# Patient Record
Sex: FEMALE | Race: BLACK OR AFRICAN AMERICAN | NOT HISPANIC OR LATINO | Employment: OTHER | URBAN - METROPOLITAN AREA
[De-identification: names, ages, dates, MRNs, and addresses within clinical notes are randomized per-mention and may not be internally consistent; named-entity substitution may affect disease eponyms.]

---

## 2017-09-08 ENCOUNTER — OFFICE VISIT (OUTPATIENT)
Dept: URGENT CARE | Facility: CLINIC | Age: 77
End: 2017-09-08
Payer: MEDICARE

## 2017-09-08 VITALS
HEART RATE: 83 BPM | HEIGHT: 67 IN | BODY MASS INDEX: 21.97 KG/M2 | DIASTOLIC BLOOD PRESSURE: 82 MMHG | TEMPERATURE: 98 F | SYSTOLIC BLOOD PRESSURE: 146 MMHG | RESPIRATION RATE: 18 BRPM | WEIGHT: 140 LBS | OXYGEN SATURATION: 98 %

## 2017-09-08 DIAGNOSIS — T14.8XXA SPLINTER IN SKIN: Primary | ICD-10-CM

## 2017-09-08 PROCEDURE — 3077F SYST BP >= 140 MM HG: CPT | Mod: S$GLB,,, | Performed by: INTERNAL MEDICINE

## 2017-09-08 PROCEDURE — 3079F DIAST BP 80-89 MM HG: CPT | Mod: S$GLB,,, | Performed by: INTERNAL MEDICINE

## 2017-09-08 PROCEDURE — 3008F BODY MASS INDEX DOCD: CPT | Mod: S$GLB,,, | Performed by: INTERNAL MEDICINE

## 2017-09-08 PROCEDURE — 1159F MED LIST DOCD IN RCRD: CPT | Mod: S$GLB,,, | Performed by: INTERNAL MEDICINE

## 2017-09-08 PROCEDURE — 99213 OFFICE O/P EST LOW 20 MIN: CPT | Mod: S$GLB,,, | Performed by: INTERNAL MEDICINE

## 2017-09-08 RX ORDER — PRAVASTATIN SODIUM 10 MG/1
10 TABLET ORAL EVERY MORNING
Refills: 1 | COMMUNITY
Start: 2017-07-14 | End: 2018-02-22 | Stop reason: SDUPTHER

## 2017-09-08 RX ORDER — AMLODIPINE BESYLATE 5 MG/1
5 TABLET ORAL DAILY
Refills: 1 | COMMUNITY
Start: 2017-08-01 | End: 2018-02-22 | Stop reason: SDUPTHER

## 2017-09-08 RX ORDER — CLOPIDOGREL BISULFATE 75 MG/1
75 TABLET ORAL DAILY
Refills: 5 | COMMUNITY
Start: 2017-08-29 | End: 2018-02-22 | Stop reason: SDUPTHER

## 2017-09-08 RX ORDER — FLUTICASONE PROPIONATE 50 MCG
SPRAY, SUSPENSION (ML) NASAL
Refills: 3 | COMMUNITY
Start: 2017-07-27 | End: 2019-03-27

## 2017-09-08 NOTE — PROGRESS NOTES
Subjective:       Patient ID: Jessenia Da Silva is a 77 y.o. female.    Chief Complaint: Hand Pain    Pt reports that her started feeling pain in the left hand on yesterday evening. She states that she ignored the pain until she woke up this morning and saw a dot. She does not recall anything biting her but is coming in to have it looked at.      Hand Pain    The incident occurred 12 to 24 hours ago. There was no injury mechanism. The pain is present in the left hand. The pain is at a severity of 6/10. The pain has been fluctuating since the incident. Pertinent negatives include no chest pain. Nothing aggravates the symptoms. She has tried nothing for the symptoms. The treatment provided no relief.   She had been working in the garden prior to pain in palm of left hand.   Review of Systems   Constitution: Negative for chills and fever.   HENT: Negative for sore throat.    Eyes: Negative for blurred vision.   Cardiovascular: Negative for chest pain.   Respiratory: Negative for shortness of breath.    Skin: Negative for flushing and rash.   Musculoskeletal: Negative for back pain and joint pain.   Gastrointestinal: Negative for abdominal pain, diarrhea, nausea and vomiting.   Neurological: Negative for headaches.   Psychiatric/Behavioral: The patient is not nervous/anxious.        Objective:      Physical Exam  Left wrist:  Full ROM.  No tenderness.  She has a very small splinter in left hypothenar area of left palm which I removed  Assessment:       1.  Splinter left palm  Plan:     1.  Splinter removed

## 2017-10-23 ENCOUNTER — HOSPITAL ENCOUNTER (EMERGENCY)
Facility: OTHER | Age: 77
Discharge: HOME | End: 2017-10-23
Attending: EMERGENCY MEDICINE
Payer: MEDICARE

## 2017-10-23 ENCOUNTER — OFFICE VISIT (OUTPATIENT)
Dept: URGENT CARE | Facility: CLINIC | Age: 77
End: 2017-10-23
Payer: MEDICARE

## 2017-10-23 VITALS
HEART RATE: 76 BPM | SYSTOLIC BLOOD PRESSURE: 138 MMHG | HEIGHT: 67 IN | WEIGHT: 138 LBS | RESPIRATION RATE: 18 BRPM | BODY MASS INDEX: 21.66 KG/M2 | OXYGEN SATURATION: 97 % | DIASTOLIC BLOOD PRESSURE: 76 MMHG | TEMPERATURE: 98 F

## 2017-10-23 VITALS
HEART RATE: 69 BPM | RESPIRATION RATE: 16 BRPM | HEIGHT: 67 IN | WEIGHT: 138 LBS | BODY MASS INDEX: 21.66 KG/M2 | OXYGEN SATURATION: 97 % | DIASTOLIC BLOOD PRESSURE: 79 MMHG | TEMPERATURE: 99 F | SYSTOLIC BLOOD PRESSURE: 174 MMHG

## 2017-10-23 DIAGNOSIS — R55 SYNCOPE, UNSPECIFIED SYNCOPE TYPE: Primary | ICD-10-CM

## 2017-10-23 DIAGNOSIS — S09.90XA INJURY OF HEAD, INITIAL ENCOUNTER: ICD-10-CM

## 2017-10-23 DIAGNOSIS — S09.90XA INJURY OF HEAD, INITIAL ENCOUNTER: Primary | ICD-10-CM

## 2017-10-23 PROCEDURE — 99282 EMERGENCY DEPT VISIT SF MDM: CPT

## 2017-10-23 PROCEDURE — 99214 OFFICE O/P EST MOD 30 MIN: CPT | Mod: S$GLB,,, | Performed by: NURSE PRACTITIONER

## 2017-10-23 NOTE — ED TRIAGE NOTES
Pt reports being outside form 10-1 on Saturday with only having coffee. Pt reports she started to feel weak and she the next thing she new is falling forward and hitting head on cast iron fence. Pt with small hematoma to L forehead. Pt denies any mobility deficits, headache, or n/v. Pt reports being on plavix. Reports feeling at baseline since.

## 2017-10-23 NOTE — ED PROVIDER NOTES
"Encounter Date: 10/23/2017    SCRIBE #1 NOTE: I, Richelle Jain, am scribing for, and in the presence of, Dr. Herring.       History     Chief Complaint   Patient presents with    Fall     syncopal episode and fall on 10/21/17 at 1300, trauma to forehead from fall into iron fence, hx of plavix, hx TIA     Time seen by provider: 5:57 PM    This is a 77 y.o. female who presents to the ED on the referral of an Urgent Care clinic with complaint of a fall that occurred 2 days ago.  The patient reports that she fell and hit her head on an iron gate while watching a  procession outside.  She mentions that the service lasted for 3 hours, and she did not eat breakfast that morning.  She states that she had a "knot" to the left forehead, which has improved since the initial injury.  The patient denies fever, chills, nausea, vomiting, HA, and confusion.  She reports no identifying, alleviating, or exacerbating factors.  She reports history of HTN and TIA.  She mentions no pertinent surgical history.       The history is provided by the patient.     Review of patient's allergies indicates:  No Known Allergies  Past Medical History:   Diagnosis Date    Hypertension     TIA (transient ischemic attack)      No past surgical history on file.  No family history on file.  Social History   Substance Use Topics    Smoking status: Former Smoker    Smokeless tobacco: Never Used    Alcohol use Yes      Comment: socially     Review of Systems   Constitutional: Negative for chills and fever.        Positive for fall.   HENT: Negative for congestion and sore throat.         Positive for head trauma.  Positive for "knot" to the left forehead.   Eyes: Negative for photophobia and redness.   Respiratory: Negative for cough and shortness of breath.    Cardiovascular: Negative for chest pain.   Gastrointestinal: Negative for abdominal pain, nausea and vomiting.   Genitourinary: Negative for dysuria.   Musculoskeletal: Negative for " back pain.   Skin: Negative for rash.   Neurological: Negative for weakness, light-headedness and headaches.   Psychiatric/Behavioral: Negative for confusion.       Physical Exam     Initial Vitals [10/23/17 1648]   BP Pulse Resp Temp SpO2   (!) 153/83 76 16 98.4 °F (36.9 °C) 96 %      MAP       106.33         Physical Exam    Nursing note and vitals reviewed.  Constitutional: She appears well-developed and well-nourished. She is not diaphoretic. No distress.   HENT:   Head: Normocephalic and atraumatic.   Mouth/Throat: Oropharynx is clear and moist.   Small residual hematoma over the left eyebrow, measuring 1 cm in circumference.  Oropharynx is clear and intact.  Moist mucous membranes.    Eyes: Conjunctivae and EOM are normal. Pupils are equal, round, and reactive to light. No scleral icterus.   PERRLA. EOMI. Conjunctiva are pink, clear, and intact. No papilledema.   Neck: Normal range of motion. Neck supple.   No meningeal signs.    Cardiovascular: Normal rate, regular rhythm, S1 normal, S2 normal and normal heart sounds. Exam reveals no gallop and no friction rub.    No murmur heard.  Pulmonary/Chest: Breath sounds normal. No respiratory distress. She has no wheezes. She has no rhonchi. She has no rales.   Clear to ascultation bilaterally.   Abdominal: Soft. Bowel sounds are normal. There is no tenderness. There is no rebound and no guarding.   No audible bruits.     Musculoskeletal: Normal range of motion. She exhibits no edema or tenderness.   No lower extremity edema.    Lymphadenopathy:     She has no cervical adenopathy.   Neurological: She is alert and oriented to person, place, and time.   Cranial nerves II-XII intact. Strength 5/5 throughout. Sensation intact to light touch throughout. Good finger to nose task ability. Negative pronator drift. Two point discrimination is intact throughout. Reflexes 2+ throughout.    Skin: Skin is warm and dry. No rash noted. No pallor.   Warm and dry. No skin tenting,  rashes, or lesions.     Psychiatric: She has a normal mood and affect. Her behavior is normal. Judgment and thought content normal.         ED Course   Procedures  Labs Reviewed - No data to display   Imaging Results          CT Head Without Contrast (In process)                                    Scribe Attestation:   Scribe #1: I performed the above scribed service and the documentation accurately describes the services I performed. I attest to the accuracy of the note.    Attending Attestation:             Attending ED Notes:   Urgent evaluation a 77-year-old female requesting to be checked out after hitting her head 2 days ago with possible LOC.  Patient has no current complaints.  Patient denies any confusion, nausea, headache, vomiting.  No neck pain.  No back pain.  Patient is afebrile, nontoxic-appearing with stable vital signs except for elevation in blood pressure.  Patient no vastly intact without focal neurologic deficits.  No midline C-spine, T-spine or L-spine tenderness to palpation, crepitus or step-offs.PERRLA, EOMI.  Strength 5 of 5 throughout.  Two point discrimination is intact throughout.  Sensation intact to light touch throughout.  Reflexes 2+ throughout.  Good finger to nose task ability. Negative pronator drift.  No papilledema.  No meningeal signs.  The patient is extensive the counseled on her diagnosis and treatment, discharged good condition and directed follow-up with PCP in the next 24-48 hours.          ED Course      Clinical Impression:     1. Injury of head, initial encounter                                 Josesito Garcia MD  10/23/17 0892

## 2017-10-23 NOTE — PROGRESS NOTES
Subjective:       Patient ID: Jessenia Da Silva is a 77 y.o. female.    Chief Complaint: Head Injury (saturday )    Syncopal episode on Saturday. Hit head on iron fence, takes blood thinner plavix.       Loss of Consciousness   This is a new problem. The current episode started in the past 7 days. Episode frequency: once. The problem has been resolved. She lost consciousness for a period of less than 1 minute. Nothing aggravates the symptoms. Pertinent negatives include no abdominal pain, back pain, chest pain, fever, headaches, light-headedness, nausea or slurred speech. She has tried nothing for the symptoms. Her past medical history is significant for TIA.     Review of Systems   Constitution: Negative for chills and fever.   HENT: Negative for sore throat.         Head injury   Cardiovascular: Positive for syncope. Negative for chest pain.   Respiratory: Negative for shortness of breath.    Skin: Negative for rash.        abrasion   Musculoskeletal: Negative for back pain and joint pain.   Gastrointestinal: Negative for abdominal pain, diarrhea and nausea.   Neurological: Negative for headaches and light-headedness.   Psychiatric/Behavioral: The patient is not nervous/anxious.    All other systems reviewed and are negative.      Objective:      Physical Exam   Constitutional: She is oriented to person, place, and time. She appears well-developed and well-nourished.   HENT:   Head: Head is with abrasion and with contusion.       Right Ear: Hearing, tympanic membrane, external ear and ear canal normal. Tympanic membrane is not erythematous. No decreased hearing is noted.   Left Ear: Hearing, tympanic membrane, external ear and ear canal normal. Tympanic membrane is not erythematous. No decreased hearing is noted.   Nose: Nose normal. No mucosal edema or rhinorrhea. Right sinus exhibits no maxillary sinus tenderness and no frontal sinus tenderness. Left sinus exhibits no maxillary sinus tenderness and no frontal  sinus tenderness.   Mouth/Throat: Uvula is midline and mucous membranes are normal. No oropharyngeal exudate or posterior oropharyngeal erythema.   Eyes: Conjunctivae, EOM and lids are normal. Pupils are equal, round, and reactive to light.   Neck: Normal range of motion. Neck supple.   Cardiovascular: Normal rate, regular rhythm, S1 normal, S2 normal and normal heart sounds.    Pulmonary/Chest: Effort normal and breath sounds normal. No respiratory distress.   Neurological: She is alert and oriented to person, place, and time. She has normal strength. No cranial nerve deficit or sensory deficit. GCS eye subscore is 4. GCS verbal subscore is 5. GCS motor subscore is 6.   Skin: Skin is warm and dry. Abrasion noted.   Nursing note and vitals reviewed.      Assessment:       1. Syncope, unspecified syncope type    2. Injury of head, initial encounter        Plan:       Jessenia was seen today for head injury.    Diagnoses and all orders for this visit:    Syncope, unspecified syncope type  -     Refer to Emergency Dept.    Injury of head, initial encounter    [facility] for further work up. Private transport with son, declined ambulance transportation

## 2017-11-10 ENCOUNTER — OFFICE VISIT (OUTPATIENT)
Dept: URGENT CARE | Facility: CLINIC | Age: 77
End: 2017-11-10
Payer: MEDICARE

## 2017-11-10 VITALS
HEIGHT: 67 IN | DIASTOLIC BLOOD PRESSURE: 76 MMHG | SYSTOLIC BLOOD PRESSURE: 124 MMHG | HEART RATE: 89 BPM | TEMPERATURE: 99 F | RESPIRATION RATE: 18 BRPM | BODY MASS INDEX: 21.66 KG/M2 | WEIGHT: 138 LBS | OXYGEN SATURATION: 96 %

## 2017-11-10 DIAGNOSIS — S16.1XXA CERVICAL STRAIN, ACUTE, INITIAL ENCOUNTER: ICD-10-CM

## 2017-11-10 DIAGNOSIS — M54.2 NECK PAIN: Primary | ICD-10-CM

## 2017-11-10 PROCEDURE — 99214 OFFICE O/P EST MOD 30 MIN: CPT | Mod: S$GLB,,, | Performed by: EMERGENCY MEDICINE

## 2017-11-10 NOTE — PROGRESS NOTES
Subjective:       Patient ID: Jessenia Da Silva is a 77 y.o. female.    Vitals:    11/10/17 1340   BP: 124/76   Pulse: 89   Resp: 18   Temp: 98.5 °F (36.9 °C)       Chief Complaint: Neck Pain    Patient came here October 23 and had a fall and hit her head.Patient was instructed to go to ER because she might need a CT ,so she went to [facility] and no CT was done.Patient is having intermittent neck pain and some dizzyness when she moves her head.      Neck Pain    This is a recurrent problem. The current episode started 1 to 4 weeks ago. The problem has been waxing and waning. The pain is associated with a fall. The pain is present in the midline, left side and right side. The quality of the pain is described as cramping and shooting. The pain is at a severity of 2/10. The pain is mild. The symptoms are aggravated by twisting. The pain is same all the time. Pertinent negatives include no chest pain, fever, headaches, numbness, pain with swallowing, tingling or trouble swallowing. Associated symptoms comments: dizzy. Treatments tried: put over the counter rub. The treatment provided mild relief.     Review of Systems   Constitution: Negative for chills and fever.   HENT: Negative for sore throat and trouble swallowing.    Eyes: Negative for blurred vision.   Cardiovascular: Negative for chest pain.   Respiratory: Negative for shortness of breath.    Skin: Negative for rash.   Musculoskeletal: Positive for neck pain (when she moves her head a certain way). Negative for back pain and joint pain.   Gastrointestinal: Negative for abdominal pain, diarrhea, nausea and vomiting.   Neurological: Positive for dizziness (when she moves head a certain way). Negative for headaches, numbness and tingling.   Psychiatric/Behavioral: The patient is not nervous/anxious.        Objective:      Physical Exam   Constitutional: She is oriented to person, place, and time. She appears well-developed and well-nourished.   HENT:   Head:  Normocephalic and atraumatic. Head is without abrasion, without contusion and without laceration.   Right Ear: External ear normal.   Left Ear: External ear normal.   Nose: Nose normal.   Mouth/Throat: Oropharynx is clear and moist.   Eyes: Conjunctivae, EOM and lids are normal. Pupils are equal, round, and reactive to light.   Neck: Trachea normal and phonation normal. Neck supple. Muscular tenderness (mild) present. No tracheal tenderness and no spinous process tenderness present. No neck rigidity. Decreased range of motion (with right twist) present. No tracheal deviation, no edema and no erythema present.   Cardiovascular: Normal rate, regular rhythm and normal heart sounds.    Pulmonary/Chest: Effort normal and breath sounds normal. No stridor. No respiratory distress.   Neurological: She is alert and oriented to person, place, and time. She has normal strength. No cranial nerve deficit or sensory deficit. Coordination and gait normal. GCS eye subscore is 4. GCS verbal subscore is 5. GCS motor subscore is 6.   Skin: Skin is warm, dry and intact. Capillary refill takes less than 2 seconds. No abrasion, no bruising, no burn, no ecchymosis, no laceration, no lesion and no rash noted. No erythema.   Psychiatric: She has a normal mood and affect. Her speech is normal and behavior is normal. Judgment and thought content normal. Cognition and memory are normal.   Nursing note and vitals reviewed.      Assessment:       1. Neck pain    2. Cervical strain, acute, initial encounter        Plan:       Jessenia was seen today for neck pain.    Diagnoses and all orders for this visit:    Neck pain  -     X-Ray Cervical Spine 2 or 3 Views; Future    Cervical strain, acute, initial encounter

## 2017-11-10 NOTE — PATIENT INSTRUCTIONS
Follow up with Primary Care Provider in 5-7 days [phone]      Understanding Cervical Strain    There are 7 bones (vertebrae) in the neck that are part of the spine. These are called the cervical spine. Cervical strain is a medical term for neck pain. The neck has several layers of muscles. These are connected with tendons to the cervical spine and other bones. Neck pain is often the result of injury to these muscles and tendons.  Causes of cervical strain  Different types of stress on the neck can damage muscles and tendons (soft tissues) and cause cervical strain. Cervical tissues can be damaged by:  · The neck being forced past its normal range of motion, such as in a car accident or sports injury  · Constant, low-level stress, such as from poor posture or a poorly set-up workspace  Symptoms of cervical strain  These may include:  · Neck pain or stiffness  · Pain in the shoulders or upper back  · Muscle spasms  · Headache, often starting at the base of the neck  · Irritability, difficulty concentrating, or sleeplessness  Treatment for cervical strain  This problem often gets better on its own. Treatments aim to reduce pain and inflammation and increase the range of motion of the neck. Possible treatments include:  · Over-the-counter or prescription pain medicine. These help relieve pain and inflammation.  · Stretching exercises to decrease neck stiffness.  · Massage to decrease neck stiffness.  · Cold or heat pack. These help reduce pain and swelling.  Call 911  Call emergency services right away if you have any of these:  · Face drooping or numbness  · Numbness or weakness, especially in the arms or on one side  · Slurred speech or difficulty speaking  · Blurred vision   When to call your healthcare provider  Call your healthcare provider right away if you have any of these:  · Fever of 100.4°F (38°C) or higher, or as directed  · Pain or stiffness that gets worse  · Symptoms that dont get better, or get  worse  · Numbness, tingling, weakness or shooting pains into the arms or legs  · New symptoms  Date Last Reviewed: 3/10/2016  © 5905-9220 [employer]. [address] [place] [address], [place] [address]. All rights reserved. This information is not intended as a substitute for professional medical care. Always follow your healthcare professional's instructions.

## 2018-02-22 ENCOUNTER — OFFICE VISIT (OUTPATIENT)
Dept: INTERNAL MEDICINE | Facility: CLINIC | Age: 78
End: 2018-02-22
Payer: MEDICARE

## 2018-02-22 ENCOUNTER — LAB VISIT (OUTPATIENT)
Dept: LAB | Facility: HOSPITAL | Age: 78
End: 2018-02-22
Attending: INTERNAL MEDICINE
Payer: MEDICARE

## 2018-02-22 VITALS
HEART RATE: 73 BPM | DIASTOLIC BLOOD PRESSURE: 70 MMHG | SYSTOLIC BLOOD PRESSURE: 138 MMHG | WEIGHT: 136.69 LBS | BODY MASS INDEX: 21.45 KG/M2 | HEIGHT: 67 IN

## 2018-02-22 DIAGNOSIS — R90.82 WHITE MATTER DISEASE, UNSPECIFIED: ICD-10-CM

## 2018-02-22 DIAGNOSIS — Z86.73 OLD LACUNAR STROKE WITHOUT LATE EFFECT: ICD-10-CM

## 2018-02-22 DIAGNOSIS — I65.22 CAROTID ATHEROSCLEROSIS, LEFT: ICD-10-CM

## 2018-02-22 DIAGNOSIS — M47.812 ARTHROPATHY OF CERVICAL FACET JOINT: ICD-10-CM

## 2018-02-22 DIAGNOSIS — Z12.31 ENCOUNTER FOR SCREENING MAMMOGRAM FOR BREAST CANCER: ICD-10-CM

## 2018-02-22 DIAGNOSIS — I11.9 HYPERTENSIVE HEART DISEASE WITHOUT HEART FAILURE: Primary | ICD-10-CM

## 2018-02-22 DIAGNOSIS — I11.9 HYPERTENSIVE HEART DISEASE WITHOUT HEART FAILURE: ICD-10-CM

## 2018-02-22 DIAGNOSIS — Z13.820 OSTEOPOROSIS SCREENING: ICD-10-CM

## 2018-02-22 LAB
25(OH)D3+25(OH)D2 SERPL-MCNC: 4 NG/ML
ALBUMIN SERPL BCP-MCNC: 4 G/DL
ALP SERPL-CCNC: 84 U/L
ALT SERPL W/O P-5'-P-CCNC: 10 U/L
ANION GAP SERPL CALC-SCNC: 11 MMOL/L
AST SERPL-CCNC: 16 U/L
BACTERIA #/AREA URNS AUTO: ABNORMAL /HPF
BASOPHILS # BLD AUTO: 0.02 K/UL
BASOPHILS NFR BLD: 0.4 %
BILIRUB SERPL-MCNC: 1.2 MG/DL
BILIRUB UR QL STRIP: NEGATIVE
BUN SERPL-MCNC: 7 MG/DL
CALCIUM SERPL-MCNC: 9.8 MG/DL
CHLORIDE SERPL-SCNC: 100 MMOL/L
CHOLEST SERPL-MCNC: 181 MG/DL
CHOLEST/HDLC SERPL: 2 {RATIO}
CLARITY UR REFRACT.AUTO: CLEAR
CO2 SERPL-SCNC: 31 MMOL/L
COLOR UR AUTO: YELLOW
CREAT SERPL-MCNC: 0.7 MG/DL
DIFFERENTIAL METHOD: NORMAL
EOSINOPHIL # BLD AUTO: 0.1 K/UL
EOSINOPHIL NFR BLD: 1.5 %
ERYTHROCYTE [DISTWIDTH] IN BLOOD BY AUTOMATED COUNT: 13.7 %
EST. GFR  (AFRICAN AMERICAN): >60 ML/MIN/1.73 M^2
EST. GFR  (NON AFRICAN AMERICAN): >60 ML/MIN/1.73 M^2
GLUCOSE SERPL-MCNC: 90 MG/DL
GLUCOSE UR QL STRIP: NEGATIVE
HCT VFR BLD AUTO: 38.5 %
HDLC SERPL-MCNC: 89 MG/DL
HDLC SERPL: 49.2 %
HGB BLD-MCNC: 12.8 G/DL
HGB UR QL STRIP: ABNORMAL
KETONES UR QL STRIP: NEGATIVE
LDLC SERPL CALC-MCNC: 75.4 MG/DL
LEUKOCYTE ESTERASE UR QL STRIP: ABNORMAL
LYMPHOCYTES # BLD AUTO: 1.4 K/UL
LYMPHOCYTES NFR BLD: 26.3 %
MCH RBC QN AUTO: 30.1 PG
MCHC RBC AUTO-ENTMCNC: 33.2 G/DL
MCV RBC AUTO: 91 FL
MICROSCOPIC COMMENT: ABNORMAL
MONOCYTES # BLD AUTO: 0.6 K/UL
MONOCYTES NFR BLD: 10.6 %
NEUTROPHILS # BLD AUTO: 3.2 K/UL
NEUTROPHILS NFR BLD: 60.8 %
NITRITE UR QL STRIP: NEGATIVE
NONHDLC SERPL-MCNC: 92 MG/DL
NRBC BLD-RTO: 0 /100 WBC
PH UR STRIP: 6 [PH] (ref 5–8)
PLATELET # BLD AUTO: 234 K/UL
PMV BLD AUTO: 9.6 FL
POTASSIUM SERPL-SCNC: 3.5 MMOL/L
PROT SERPL-MCNC: 7.1 G/DL
PROT UR QL STRIP: NEGATIVE
RBC # BLD AUTO: 4.25 M/UL
RBC #/AREA URNS AUTO: 2 /HPF (ref 0–4)
SODIUM SERPL-SCNC: 142 MMOL/L
SP GR UR STRIP: 1.01 (ref 1–1.03)
SQUAMOUS #/AREA URNS AUTO: 0 /HPF
TRIGL SERPL-MCNC: 83 MG/DL
URN SPEC COLLECT METH UR: ABNORMAL
UROBILINOGEN UR STRIP-ACNC: NEGATIVE EU/DL
WBC # BLD AUTO: 5.28 K/UL
WBC #/AREA URNS AUTO: 14 /HPF (ref 0–5)

## 2018-02-22 PROCEDURE — 3075F SYST BP GE 130 - 139MM HG: CPT | Mod: S$GLB,,, | Performed by: INTERNAL MEDICINE

## 2018-02-22 PROCEDURE — 81001 URINALYSIS AUTO W/SCOPE: CPT

## 2018-02-22 PROCEDURE — 85025 COMPLETE CBC W/AUTO DIFF WBC: CPT

## 2018-02-22 PROCEDURE — 99215 OFFICE O/P EST HI 40 MIN: CPT | Mod: S$GLB,,, | Performed by: INTERNAL MEDICINE

## 2018-02-22 PROCEDURE — 36415 COLL VENOUS BLD VENIPUNCTURE: CPT

## 2018-02-22 PROCEDURE — 80053 COMPREHEN METABOLIC PANEL: CPT

## 2018-02-22 PROCEDURE — 82306 VITAMIN D 25 HYDROXY: CPT

## 2018-02-22 PROCEDURE — 80061 LIPID PANEL: CPT

## 2018-02-22 PROCEDURE — 3078F DIAST BP <80 MM HG: CPT | Mod: S$GLB,,, | Performed by: INTERNAL MEDICINE

## 2018-02-22 PROCEDURE — 99999 PR PBB SHADOW E&M-EST. PATIENT-LVL III: CPT | Mod: PBBFAC,,, | Performed by: INTERNAL MEDICINE

## 2018-02-22 RX ORDER — PRAVASTATIN SODIUM 10 MG/1
10 TABLET ORAL EVERY MORNING
Qty: 90 TABLET | Refills: 0 | Status: SHIPPED | OUTPATIENT
Start: 2018-02-22 | End: 2018-05-08 | Stop reason: SDUPTHER

## 2018-02-22 RX ORDER — AMLODIPINE BESYLATE 5 MG/1
5 TABLET ORAL DAILY
Qty: 90 TABLET | Refills: 0 | Status: SHIPPED | OUTPATIENT
Start: 2018-02-22 | End: 2018-05-08 | Stop reason: SDUPTHER

## 2018-02-22 RX ORDER — CLOPIDOGREL BISULFATE 75 MG/1
75 TABLET ORAL DAILY
Qty: 90 TABLET | Refills: 3 | Status: SHIPPED | OUTPATIENT
Start: 2018-02-22 | End: 2019-03-27 | Stop reason: SDUPTHER

## 2018-03-05 NOTE — PROGRESS NOTES
Subjective:       Patient ID: Jessenia Da Silva is a 78 y.o. female.    Chief Complaint: Establish Care    She is new to me. Had multiple urgent care visits at the [facility]. Last seen by PCP at [facility] 3 months ago. Presents for transfer of care. No acute complaints. Home BP checked infrequently ranges 120-130's systolic.     PMH:   Hypertension with concentric remodeling on echo , normal LV function.   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   Cervical spondylosis.  Left carotid disease seen on x-ray.   Labs here  include normal CBC, CMP and Lipids (LDL 91).    PSH: Tonsillectomy. Hysterectomy and BSO. Lasik right eye. Bilateral Cataract extraction.    Mammogram normal . No BMD. Colonoscopy normal >10 yrs ago. Eye exam  Dr. Saucedo. Flu shot 10/17.     Social: Social tobacco use up to 3 cigarettes on occasion. Occasional alcohol. , adult son lives locally - he is a heart and kidney transplant recipient. Retired consumer credit counselor.     FMH: HTN in father, dementia in mother, pancreatic cancer in sister.     NKDA.     Medications: Amlodipine 5mg daily, Pravastatin 10mg daily, Plavix 75mg daily, Flonase prn.       Review of Systems   Constitutional: Negative for activity change, appetite change, fatigue, fever and unexpected weight change.   HENT: Negative for congestion, hearing loss, rhinorrhea, sneezing, sore throat, trouble swallowing and voice change.    Eyes: Negative for pain and visual disturbance.   Respiratory: Negative for cough, chest tightness, shortness of breath and wheezing.    Cardiovascular: Negative for chest pain, palpitations and leg swelling.   Gastrointestinal: Negative for abdominal pain, blood in stool, constipation, diarrhea, nausea and vomiting.   Genitourinary: Negative for difficulty urinating, dysuria, flank pain, frequency, hematuria and urgency.   Musculoskeletal:  "Positive for back pain. Negative for arthralgias, joint swelling, myalgias and neck pain.        Mild intermittent low back pain, no radicular symptoms.    Skin: Negative for color change and rash.   Neurological: Positive for dizziness. Negative for syncope, facial asymmetry, speech difficulty, weakness, numbness and headaches.        Occasional momentary dizziness if she gets up too fast.    Hematological: Negative for adenopathy. Does not bruise/bleed easily.   Psychiatric/Behavioral: Negative for agitation, dysphoric mood and sleep disturbance. The patient is not nervous/anxious.        Objective:    /70, Pulse 73, Ht 5' 7", Wt 136.7 lbs, BMI=21.4  Physical Exam   Constitutional: She is oriented to person, place, and time. She appears well-developed and well-nourished. No distress.   HENT:   Head: Normocephalic and atraumatic.   Right Ear: External ear normal.   Left Ear: External ear normal.   Nose: Nose normal.   Mouth/Throat: Oropharynx is clear and moist. No oropharyngeal exudate.   Eyes: Conjunctivae and EOM are normal. Pupils are equal, round, and reactive to light. Right conjunctiva is not injected. Left conjunctiva is not injected. No scleral icterus.   Neck: Normal range of motion. Neck supple. No JVD present. Carotid bruit is not present. No thyromegaly present.   Cardiovascular: Normal rate, regular rhythm, normal heart sounds and intact distal pulses.  Exam reveals no gallop and no friction rub.    No murmur heard.  Pulmonary/Chest: Effort normal and breath sounds normal. No respiratory distress. She has no wheezes. She has no rhonchi. She has no rales.   Abdominal: Soft. Bowel sounds are normal. She exhibits no distension and no mass. There is no hepatosplenomegaly. There is no tenderness. There is no CVA tenderness.   Musculoskeletal: Normal range of motion. She exhibits no edema, tenderness or deformity.   Lymphadenopathy:     She has no cervical adenopathy.   Neurological: She is alert and " oriented to person, place, and time. She has normal strength and normal reflexes. No cranial nerve deficit. She exhibits normal muscle tone. Coordination and gait normal.   Skin: Skin is warm and dry. No lesion and no rash noted. She is not diaphoretic. No cyanosis or erythema. No pallor. Nails show no clubbing.   Psychiatric: She has a normal mood and affect. Her behavior is normal. Judgment and thought content normal.   Vitals reviewed.      Assessment:       1. Hypertensive heart disease without heart failure    2. Carotid atherosclerosis, left    3. White matter disease, unspecified    4. 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 Arthropathy of cervical facet joint    6. Encounter for screening mammogram for breast cancer    7. Osteoporosis screening        Plan:       Hypertensive heart disease without heart failure - usually controlled, continue same.   -     CBC auto differential; Future; Expected date: 02/22/2018  -     Comprehensive metabolic panel; Future; Expected date: 02/22/2018  -     Lipid panel; Future; Expected date: 02/22/2018  -     Vitamin D; Future; Expected date: 02/22/2018  -     Urinalysis  -     EKG 12-lead; Future  -     amLODIPine (NORVASC) 5 MG tablet; Take 1 tablet (5 mg total) by mouth once daily.  Dispense: 90 tablet; Refill: 0    Carotid atherosclerosis, left  -     Cardiology Lab Carotid US Bilateral; Future  -     pravastatin (PRAVACHOL) 10 MG tablet; Take 1 tablet (10 mg total) by mouth every morning.  Dispense: 90 tablet; Refill: 0    White matter disease, unspecified  -     clopidogrel (PLAVIX) 75 mg tablet; Take 1 tablet (75 mg total) by mouth once daily.  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  Dispense: 90 tablet; Refill: 3    Arthropathy of cervical facet joint - stable without symptoms at this time.    Encounter for screening mammogram for breast  cancer  -     Mammo Digital Screening Bilat with CAD; Future; Expected date: 02/22/2018    Osteoporosis screening  -     DXA Bone Density Spine And Hip; Future; Expected date: 02/22/2018

## 2018-03-08 ENCOUNTER — HOSPITAL ENCOUNTER (OUTPATIENT)
Dept: RADIOLOGY | Facility: CLINIC | Age: 78
Discharge: HOME | End: 2018-03-08
Attending: INTERNAL MEDICINE
Payer: MEDICARE

## 2018-03-08 ENCOUNTER — HOSPITAL ENCOUNTER (OUTPATIENT)
Dept: CARDIOLOGY | Facility: CLINIC | Age: 78
Discharge: HOME | End: 2018-03-08
Payer: MEDICARE

## 2018-03-08 ENCOUNTER — CLINICAL SUPPORT (OUTPATIENT)
Dept: CARDIOLOGY | Facility: CLINIC | Age: 78
End: 2018-03-08
Attending: INTERNAL MEDICINE
Payer: MEDICARE

## 2018-03-08 DIAGNOSIS — Z13.820 OSTEOPOROSIS SCREENING: ICD-10-CM

## 2018-03-08 DIAGNOSIS — I11.9 HYPERTENSIVE HEART DISEASE WITHOUT HEART FAILURE: ICD-10-CM

## 2018-03-08 DIAGNOSIS — I65.22 CAROTID ATHEROSCLEROSIS, LEFT: ICD-10-CM

## 2018-03-08 LAB — INTERNAL CAROTID STENOSIS: NORMAL

## 2018-03-08 PROCEDURE — 93880 EXTRACRANIAL BILAT STUDY: CPT | Mod: S$GLB,,, | Performed by: INTERNAL MEDICINE

## 2018-03-08 PROCEDURE — 77080 DXA BONE DENSITY AXIAL: CPT | Mod: 26,,, | Performed by: INTERNAL MEDICINE

## 2018-03-08 PROCEDURE — 93000 ELECTROCARDIOGRAM COMPLETE: CPT | Mod: S$GLB,,, | Performed by: INTERNAL MEDICINE

## 2018-03-08 PROCEDURE — 77080 DXA BONE DENSITY AXIAL: CPT | Mod: TC

## 2018-03-27 DIAGNOSIS — E55.9 VITAMIN D DEFICIENCY: Primary | ICD-10-CM

## 2018-03-27 RX ORDER — ERGOCALCIFEROL 1.25 MG/1
50000 CAPSULE ORAL
Qty: 12 CAPSULE | Refills: 3 | Status: SHIPPED | OUTPATIENT
Start: 2018-03-27 | End: 2019-04-17 | Stop reason: SDUPTHER

## 2018-04-09 ENCOUNTER — HOSPITAL ENCOUNTER (OUTPATIENT)
Dept: RADIOLOGY | Facility: HOSPITAL | Age: 78
Discharge: HOME | End: 2018-04-09
Attending: INTERNAL MEDICINE
Payer: MEDICARE

## 2018-04-09 DIAGNOSIS — Z12.31 ENCOUNTER FOR SCREENING MAMMOGRAM FOR BREAST CANCER: ICD-10-CM

## 2018-04-09 PROCEDURE — 77067 SCR MAMMO BI INCL CAD: CPT | Mod: TC

## 2018-04-09 PROCEDURE — 77067 SCR MAMMO BI INCL CAD: CPT | Mod: 26,,, | Performed by: RADIOLOGY

## 2018-04-09 PROCEDURE — 77063 BREAST TOMOSYNTHESIS BI: CPT | Mod: 26,,, | Performed by: RADIOLOGY

## 2018-05-08 ENCOUNTER — OFFICE VISIT (OUTPATIENT)
Dept: INTERNAL MEDICINE | Facility: CLINIC | Age: 78
End: 2018-05-08
Payer: MEDICARE

## 2018-05-08 VITALS
HEIGHT: 67 IN | HEART RATE: 79 BPM | WEIGHT: 134.5 LBS | BODY MASS INDEX: 21.11 KG/M2 | SYSTOLIC BLOOD PRESSURE: 112 MMHG | DIASTOLIC BLOOD PRESSURE: 60 MMHG

## 2018-05-08 DIAGNOSIS — M85.80 OSTEOPENIA, UNSPECIFIED LOCATION: ICD-10-CM

## 2018-05-08 DIAGNOSIS — I11.9 HYPERTENSIVE HEART DISEASE WITHOUT HEART FAILURE: Primary | ICD-10-CM

## 2018-05-08 DIAGNOSIS — I77.9 BILATERAL CAROTID ARTERY DISEASE: ICD-10-CM

## 2018-05-08 DIAGNOSIS — E55.9 VITAMIN D DEFICIENCY: ICD-10-CM

## 2018-05-08 PROBLEM — I65.22 CAROTID ATHEROSCLEROSIS, LEFT: Status: RESOLVED | Noted: 2018-02-22 | Resolved: 2018-05-08

## 2018-05-08 PROCEDURE — 99999 PR PBB SHADOW E&M-EST. PATIENT-LVL III: CPT | Mod: PBBFAC,,, | Performed by: INTERNAL MEDICINE

## 2018-05-08 PROCEDURE — 99213 OFFICE O/P EST LOW 20 MIN: CPT | Mod: S$GLB,,, | Performed by: INTERNAL MEDICINE

## 2018-05-08 PROCEDURE — 3078F DIAST BP <80 MM HG: CPT | Mod: CPTII,S$GLB,, | Performed by: INTERNAL MEDICINE

## 2018-05-08 PROCEDURE — 3074F SYST BP LT 130 MM HG: CPT | Mod: CPTII,S$GLB,, | Performed by: INTERNAL MEDICINE

## 2018-05-08 RX ORDER — AMLODIPINE BESYLATE 5 MG/1
5 TABLET ORAL DAILY
Qty: 90 TABLET | Refills: 2 | Status: SHIPPED | OUTPATIENT
Start: 2018-05-08 | End: 2019-03-27 | Stop reason: SDUPTHER

## 2018-05-08 RX ORDER — PRAVASTATIN SODIUM 10 MG/1
10 TABLET ORAL EVERY MORNING
Qty: 90 TABLET | Refills: 2 | Status: SHIPPED | OUTPATIENT
Start: 2018-05-08 | End: 2019-07-08 | Stop reason: SDUPTHER

## 2018-05-08 NOTE — PROGRESS NOTES
Subjective:       Patient ID: Jessenia Da Silva is a 78 y.o. female.    Chief Complaint: Hypertension    Seen for initial visit to [facility] 2 months ago. Blood pressure was fair 138/70. Returns for f/u and test results. No new complaints except mild constipation on Rx vitamin D.     PMH:   Hypertension with concentric remodeling on echo , normal LV function.   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   Cervical spondylosis.  Mild Bilateral Carotid Artery Disease on ultrasound 3/18.  Osteopenia.     PSH: Tonsillectomy. Hysterectomy and BSO. Lasik right eye. Bilateral Cataract extraction.    Mammogram normal . BMD 3/18. EKG normal 3/18. Colonoscopy normal >10 yrs ago. Eye exam  Dr. Saucedo. Flu shot 10/17. Labs : CBC normal, CMP normal except CO2 31, TBili 1.2, Vit D 4, TChol 181, TG 83, HDL 89, LDL 75, Urinalysis with leukocytes but no bacteria, glucose or protein.    Social: Social tobacco use up to 3 cigarettes on occasion. Occasional alcohol. , adult son lives locally - he is a heart and kidney transplant recipient. Retired consumer credit counselor.     FMH: HTN in father, dementia in mother, pancreatic cancer in sister.     NKDA.     Medications: Amlodipine 5mg daily, Pravastatin 10mg daily, Plavix 75mg daily, Flonase prn, Vitamin D 50,000 weekly.       Review of Systems   Constitutional: Negative for fatigue, fever and unexpected weight change.   Respiratory: Negative for cough and shortness of breath.    Cardiovascular: Negative for chest pain, palpitations and leg swelling.   Gastrointestinal: Negative for abdominal pain, nausea and vomiting.   Genitourinary: Negative for dysuria and frequency.   Musculoskeletal: Negative for arthralgias and myalgias.   Skin: Negative for color change and rash.   Neurological: Negative for dizziness, seizures, syncope, facial asymmetry, speech difficulty, weakness,  "numbness and headaches.       Objective:    /60, Pulse 79, Ht 5' 7", Wt 134.5 lbs (stable), BMI=21  Physical Exam   Constitutional: She is oriented to person, place, and time. She appears well-developed and well-nourished. No distress.   HENT:   Nose: Nose normal.   Mouth/Throat: Oropharynx is clear and moist.   Eyes: Conjunctivae are normal. No scleral icterus.   Neck: Normal range of motion. No JVD present.   Cardiovascular: Normal rate, regular rhythm and normal heart sounds.    Pulmonary/Chest: Effort normal and breath sounds normal. No respiratory distress. She has no wheezes. She has no rales.   Musculoskeletal: Normal range of motion. She exhibits no edema or tenderness.   Neurological: She is alert and oriented to person, place, and time. No cranial nerve deficit. Coordination normal.   Skin: Skin is warm and dry. She is not diaphoretic.   Psychiatric: She has a normal mood and affect. Her behavior is normal.       Assessment:       1. Hypertensive heart disease without heart failure    2. Bilateral carotid artery disease    3. Osteopenia, unspecified location    4. Vitamin D deficiency        Plan:       Hypertensive heart disease without heart failure - well controlled, continue same.  -     amLODIPine (NORVASC) 5 MG tablet; Take 1 tablet (5 mg total) by mouth once daily.  Dispense: 90 tablet; Refill: 2    Bilateral carotid artery disease  -     pravastatin (PRAVACHOL) 10 MG tablet; Take 1 tablet (10 mg total) by mouth every morning.  Dispense: 90 tablet; Refill: 2    Osteopenia, unspecified location - adequate calcium and vitamin D.     Vitamin D deficiency - continue Rx Vit D, stool softener as needed.          "

## 2018-06-06 ENCOUNTER — OFFICE VISIT (OUTPATIENT)
Dept: URGENT CARE | Facility: CLINIC | Age: 78
End: 2018-06-06
Payer: MEDICARE

## 2018-06-06 VITALS
RESPIRATION RATE: 16 BRPM | HEART RATE: 87 BPM | OXYGEN SATURATION: 95 % | TEMPERATURE: 98 F | WEIGHT: 134 LBS | DIASTOLIC BLOOD PRESSURE: 70 MMHG | BODY MASS INDEX: 21.03 KG/M2 | HEIGHT: 67 IN | SYSTOLIC BLOOD PRESSURE: 123 MMHG

## 2018-06-06 DIAGNOSIS — I11.9 HYPERTENSIVE HEART DISEASE WITHOUT HEART FAILURE: ICD-10-CM

## 2018-06-06 DIAGNOSIS — R09.89 SUSPECTED DEEP VEIN THROMBOSIS (DVT): ICD-10-CM

## 2018-06-06 DIAGNOSIS — Z86.73 OLD LACUNAR STROKE WITHOUT LATE EFFECT: ICD-10-CM

## 2018-06-06 DIAGNOSIS — M79.661 RIGHT CALF PAIN: Primary | ICD-10-CM

## 2018-06-06 PROCEDURE — 3078F DIAST BP <80 MM HG: CPT | Mod: CPTII,S$GLB,, | Performed by: NURSE PRACTITIONER

## 2018-06-06 PROCEDURE — 3074F SYST BP LT 130 MM HG: CPT | Mod: CPTII,S$GLB,, | Performed by: NURSE PRACTITIONER

## 2018-06-06 PROCEDURE — 99214 OFFICE O/P EST MOD 30 MIN: CPT | Mod: S$GLB,,, | Performed by: NURSE PRACTITIONER

## 2018-06-06 NOTE — PROGRESS NOTES
"Subjective:       Patient ID: Jessenia Da Silva is a 78 y.o. female.    Vitals:    06/06/18 1628   BP: 123/70   Pulse: 87   Resp: 16   Temp: 97.7 °F (36.5 °C)   TempSrc: Oral   SpO2: 95%   Weight: 60.8 kg (134 lb)   Height: 5' 7" (1.702 m)       Chief Complaint: Bleeding/Bruising (right lower leg)    Patient reports bruising with knots on right lower leg.Patient reports no known trauma.  PATIENT ON PLAVIX, CAROTID ARTERY DISEASE, HX OF LACUNER STROKE      Other   This is a new problem. Episode onset: 3 days. The problem occurs constantly. The problem has been unchanged. Pertinent negatives include no abdominal pain, chest pain, chills, fever, headaches, nausea, rash, sore throat or vomiting. Exacerbated by: palpitation. Treatments tried: cream that patient can't recal name. The treatment provided no relief.     Review of Systems   Constitution: Negative for chills and fever.   HENT: Negative for sore throat.    Eyes: Negative for blurred vision.   Cardiovascular: Negative for chest pain.   Respiratory: Negative for shortness of breath.    Skin: Positive for itching. Negative for rash.   Musculoskeletal: Negative for back pain and joint pain.   Gastrointestinal: Negative for abdominal pain, diarrhea, nausea and vomiting.   Neurological: Negative for headaches.   Psychiatric/Behavioral: The patient is not nervous/anxious.        Objective:      Physical Exam   Constitutional: She is oriented to person, place, and time. She appears well-developed and well-nourished. She is cooperative.  Non-toxic appearance. She does not appear ill. No distress.   HENT:   Head: Normocephalic and atraumatic.   Right Ear: Hearing, tympanic membrane, external ear and ear canal normal.   Left Ear: Hearing, tympanic membrane, external ear and ear canal normal.   Nose: Nose normal. No mucosal edema, rhinorrhea or nasal deformity. No epistaxis. Right sinus exhibits no maxillary sinus tenderness and no frontal sinus tenderness. Left sinus " exhibits no maxillary sinus tenderness and no frontal sinus tenderness.   Mouth/Throat: Uvula is midline, oropharynx is clear and moist and mucous membranes are normal. No trismus in the jaw. Normal dentition. No uvula swelling. No posterior oropharyngeal erythema.   Eyes: Conjunctivae and lids are normal. Right eye exhibits no discharge. Left eye exhibits no discharge. No scleral icterus.   Sclera clear bilat   Neck: Trachea normal, normal range of motion, full passive range of motion without pain and phonation normal. Neck supple.   Cardiovascular: Normal rate, regular rhythm, normal heart sounds, intact distal pulses and normal pulses.    Pulmonary/Chest: Effort normal and breath sounds normal. No respiratory distress.   Abdominal: Soft. Normal appearance and bowel sounds are normal. She exhibits no distension, no pulsatile midline mass and no mass. There is no tenderness.   Musculoskeletal: Normal range of motion. She exhibits no edema.        Right lower leg: She exhibits tenderness and deformity.        Left lower leg: Normal. She exhibits no tenderness, no bony tenderness, no swelling, no edema and no deformity (2 AREAS OF TTP, INFLAMMATION, ECCHYMOSIS, MEDIAL ASPECT OF RIGHT CALF).        Legs:  RIGHT CALF:  2 ECCHYMOSIS-[id number] AND [id number], MILD ECCHYMOSIS, TTP, KNOT LIKE NOTED TO PALPATION. NO LACERATION.   Neurological: She is alert and oriented to person, place, and time. She exhibits normal muscle tone. Coordination normal.   Skin: Skin is warm, dry and intact. She is not diaphoretic. No pallor.   Psychiatric: She has a normal mood and affect. Her speech is normal and behavior is normal. Judgment and thought content normal. Cognition and memory are normal.   Nursing note and vitals reviewed.      Assessment:       1. Right calf pain    2. 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 Suspected deep vein thrombosis (DVT)    4. Hypertensive heart disease without heart failure        Plan:       CALLED AND VOICE MESSAGE LEFT TO CONTACT PATIENT AND SET UP WITH OUTPATIENT ULTRASOUND. ADVISED PATIENT OF OUTPATIENT ULTRASOUND TO RULE OUT DVT. PATIENT STATES SHE IS SEEING PCP ON Friday. SHE IS HESITANT ON GOING TO THE ULTRASOUND AND WILL DECIDE TOMORROW.    Jessenia was seen today for bleeding/bruising.    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    We will call you with the results of your ultrasound of your leg.    Follow up with your PCP on Friday as we discussed.    See handout on dvt education.    Follow up with your doctor in a few days.  Return to the urgent care or go to the ER if symptoms get worse.      Deep Vein Thrombosis (DVT)    Deep vein thrombosis (DVT) occurs when a blood clot (thrombus) forms in a deep vein. This happens most often in the leg. It can also happen in the arms or other parts of the body. A part of the clot called an embolus can break off and travel to the lungs. When this happens, its called a pulmonary embolism (PE). PE is a medical emergency. It can cut off blood flow and lead to death. Both DVT and PE are closely related. Together, they are often referred to by the term venous thromboembolism (VTE).  Risk factors for DVT  Anything that slows blood flow, injures the lining of a vein, or increases blood clotting can make you more prone to having DVT. This includes the following:  · Long periods without movement (such as when sitting for many hours at a time or when recovering from major surgery or illness)  · Estrogen (female hormone) therapy, such as hormone replacement therapy (HRT) or oral  contraceptives  · Fractured hip or leg  · Major surgery or joint replacement  · Major trauma or spinal cord injury  · Cancer  · Family history  · Excess weight or obesity  · Smoking  · Older age  Symptoms  DVT does not always cause symptoms. When symptoms do occur, they may appear around the site of the DVT, such as in the leg. Possible symptoms include:  · Swelling  · Pain  · Warmth  · Redness  · Tenderness  Home care  · You were likely prescribed blood thinners (anticoagulants). They may be given as pills (oral) or shots (injections). Follow all instructions when using these medicines. Note: Do not take blood thinners with other medicines, herbal remedies, or supplements without talking to your provider first. Certain medicines or products can affect how blood thinners work.  · Follow your providers instructions about activity and rest.  · If support or compression stockings are prescribed, wear them as directed. These may help improve blood flow in the legs.  · When sitting or lying down, move your ankles, toes and knees often. This may also help improve blood flow in the legs.  Follow-up care  Follow up with your healthcare provider, or as advised. If imaging tests were done, they may need further review by a doctor. You will be told of any new findings that may affect your care.  When to seek medical advice  Call your healthcare provider right away if any of these occur:  · New or increased swelling, pain, tenderness, warmth, or redness, in the leg, arm, or other area  · Blood in the urine  · Bleeding with bowel movements  Call 911  Call 911 right away if any of these occur:  · Bleeding from the nose, gums, a cut, or vagina  · Heavy or uncontrolled bleeding  · Trouble breathing  · Chest pain or discomfort that worsens with deep breathing or coughing  · Coughing (may cough up blood)  · Fast heartbeat  · Sweating  · Anxiety  · Lightheadedness, dizziness, or fainting  Date Last Reviewed: 9/21/2015  © 5420-4662  [employer]. [address] [place] [address], [place] [address]. All rights reserved. This information is not intended as a substitute for professional medical care. Always follow your healthcare professional's instructions.

## 2018-06-06 NOTE — PATIENT INSTRUCTIONS
Please call 1 [phone] if you do not hear from [facility] to get your referral appointment we discussed.    We will call you with the results of your ultrasound of your leg.    Follow up with your PCP on Friday as we discussed.    See handout on dvt education.    Follow up with your doctor in a few days.  Return to the urgent care or go to the ER if symptoms get worse.      Deep Vein Thrombosis (DVT)    Deep vein thrombosis (DVT) occurs when a blood clot (thrombus) forms in a deep vein. This happens most often in the leg. It can also happen in the arms or other parts of the body. A part of the clot called an embolus can break off and travel to the lungs. When this happens, its called a pulmonary embolism (PE). PE is a medical emergency. It can cut off blood flow and lead to death. Both DVT and PE are closely related. Together, they are often referred to by the term venous thromboembolism (VTE).  Risk factors for DVT  Anything that slows blood flow, injures the lining of a vein, or increases blood clotting can make you more prone to having DVT. This includes the following:  · Long periods without movement (such as when sitting for many hours at a time or when recovering from major surgery or illness)  · Estrogen (female hormone) therapy, such as hormone replacement therapy (HRT) or oral contraceptives  · Fractured hip or leg  · Major surgery or joint replacement  · Major trauma or spinal cord injury  · Cancer  · Family history  · Excess weight or obesity  · Smoking  · Older age  Symptoms  DVT does not always cause symptoms. When symptoms do occur, they may appear around the site of the DVT, such as in the leg. Possible symptoms include:  · Swelling  · Pain  · Warmth  · Redness  · Tenderness  Home care  · You were likely prescribed blood thinners (anticoagulants). They may be given as pills (oral) or shots (injections). Follow all instructions when using these medicines. Note: Do not take blood thinners with other  medicines, herbal remedies, or supplements without talking to your provider first. Certain medicines or products can affect how blood thinners work.  · Follow your providers instructions about activity and rest.  · If support or compression stockings are prescribed, wear them as directed. These may help improve blood flow in the legs.  · When sitting or lying down, move your ankles, toes and knees often. This may also help improve blood flow in the legs.  Follow-up care  Follow up with your healthcare provider, or as advised. If imaging tests were done, they may need further review by a doctor. You will be told of any new findings that may affect your care.  When to seek medical advice  Call your healthcare provider right away if any of these occur:  · New or increased swelling, pain, tenderness, warmth, or redness, in the leg, arm, or other area  · Blood in the urine  · Bleeding with bowel movements  Call 911  Call 911 right away if any of these occur:  · Bleeding from the nose, gums, a cut, or vagina  · Heavy or uncontrolled bleeding  · Trouble breathing  · Chest pain or discomfort that worsens with deep breathing or coughing  · Coughing (may cough up blood)  · Fast heartbeat  · Sweating  · Anxiety  · Lightheadedness, dizziness, or fainting  Date Last Reviewed: 9/21/2015 © 2000-2017 [employer]. [address] [place] [address], [place] [address]. All rights reserved. This information is not intended as a substitute for professional medical care. Always follow your healthcare professional's instructions.

## 2018-06-08 ENCOUNTER — HOSPITAL ENCOUNTER (OUTPATIENT)
Dept: RADIOLOGY | Facility: OTHER | Age: 78
Discharge: HOME | End: 2018-06-08
Attending: NURSE PRACTITIONER
Payer: MEDICARE

## 2018-06-08 ENCOUNTER — TELEPHONE (OUTPATIENT)
Dept: URGENT CARE | Facility: CLINIC | Age: 78
End: 2018-06-08

## 2018-06-08 ENCOUNTER — OFFICE VISIT (OUTPATIENT)
Dept: INTERNAL MEDICINE | Facility: CLINIC | Age: 78
End: 2018-06-08
Payer: MEDICARE

## 2018-06-08 VITALS
HEIGHT: 67 IN | WEIGHT: 132.31 LBS | SYSTOLIC BLOOD PRESSURE: 114 MMHG | DIASTOLIC BLOOD PRESSURE: 60 MMHG | TEMPERATURE: 99 F | HEART RATE: 81 BPM | BODY MASS INDEX: 20.76 KG/M2

## 2018-06-08 DIAGNOSIS — M79.661 RIGHT CALF PAIN: ICD-10-CM

## 2018-06-08 DIAGNOSIS — I11.9 HYPERTENSIVE HEART DISEASE WITHOUT HEART FAILURE: ICD-10-CM

## 2018-06-08 DIAGNOSIS — R58 ECCHYMOSIS: Primary | ICD-10-CM

## 2018-06-08 DIAGNOSIS — R09.89 SUSPECTED DEEP VEIN THROMBOSIS (DVT): ICD-10-CM

## 2018-06-08 PROCEDURE — 3078F DIAST BP <80 MM HG: CPT | Mod: CPTII,S$GLB,, | Performed by: INTERNAL MEDICINE

## 2018-06-08 PROCEDURE — 3074F SYST BP LT 130 MM HG: CPT | Mod: CPTII,S$GLB,, | Performed by: INTERNAL MEDICINE

## 2018-06-08 PROCEDURE — 99213 OFFICE O/P EST LOW 20 MIN: CPT | Mod: S$GLB,,, | Performed by: INTERNAL MEDICINE

## 2018-06-08 PROCEDURE — 93971 EXTREMITY STUDY: CPT | Mod: 26,,, | Performed by: RADIOLOGY

## 2018-06-08 PROCEDURE — 99999 PR PBB SHADOW E&M-EST. PATIENT-LVL III: CPT | Mod: PBBFAC,,, | Performed by: INTERNAL MEDICINE

## 2018-06-08 PROCEDURE — 93971 EXTREMITY STUDY: CPT | Mod: TC

## 2018-06-08 NOTE — TELEPHONE ENCOUNTER
6/8/18  15:05  BLPMD    Called and discussed with Mrs Da Silva the results of her ultrasound which was negative for dvt of the entire right le, as well as the contralteral left cfv. She was pleased to her this information and actually was headed right as we spoke to her follow up appointment with her PCP where they will review the results and for long term care.    6/8/18  15:05  BLPMD

## 2018-06-08 NOTE — PROGRESS NOTES
Subjective:       Patient ID: Jessenia Da Silva is a 78 y.o. female.    Chief Complaint: Leg Pain (bruised knot on right leg)    Patient known to me, last seen one month ago. Presents urgently for bruise with knot on right leg. Seen for same by another provider two days ago. Venous doppler ultrasound done today is negative for DVT. She has no swelling in the limb and no pain. Able to ambulate normally. No chest pain, SOB, etc.     Past history, meds and allergies reviewed and unchanged from my note of 5/8/18.      Review of Systems    Objective:    /60, Pulse 81, Temp 98.9  Physical Exam   Constitutional: She appears well-developed and well-nourished. No distress.   Cardiovascular: Normal rate, regular rhythm and normal heart sounds.    Pulmonary/Chest: Effort normal and breath sounds normal. No respiratory distress. She has no wheezes. She has no rales.   Musculoskeletal: Normal range of motion. She exhibits no edema.   Ecchymosis on back of right calf is flat now, no swelling or induration. Leg exam otherwise normal.        Assessment:       1. Ecchymosis    2. Hypertensive heart disease without heart failure        Plan:       Ecchymosis - patient reassured, will bruise easily on Plavix. CBC normal last month, no labs needed at this time.     Hypertensive heart disease without heart failure - well controlled, continue same.

## 2018-06-11 ENCOUNTER — TELEPHONE (OUTPATIENT)
Dept: URGENT CARE | Facility: CLINIC | Age: 78
End: 2018-06-11

## 2018-09-17 ENCOUNTER — OFFICE VISIT (OUTPATIENT)
Dept: URGENT CARE | Facility: CLINIC | Age: 78
End: 2018-09-17
Payer: MEDICARE

## 2018-09-17 VITALS
SYSTOLIC BLOOD PRESSURE: 133 MMHG | OXYGEN SATURATION: 95 % | DIASTOLIC BLOOD PRESSURE: 84 MMHG | WEIGHT: 132 LBS | HEIGHT: 67 IN | HEART RATE: 88 BPM | TEMPERATURE: 100 F | RESPIRATION RATE: 16 BRPM | BODY MASS INDEX: 20.72 KG/M2

## 2018-09-17 DIAGNOSIS — R09.82 PND (POST-NASAL DRIP): ICD-10-CM

## 2018-09-17 DIAGNOSIS — R05.9 COUGH: ICD-10-CM

## 2018-09-17 DIAGNOSIS — J30.9 ALLERGIC RHINITIS, UNSPECIFIED SEASONALITY, UNSPECIFIED TRIGGER: Primary | ICD-10-CM

## 2018-09-17 PROCEDURE — 3075F SYST BP GE 130 - 139MM HG: CPT | Mod: CPTII,S$GLB,, | Performed by: EMERGENCY MEDICINE

## 2018-09-17 PROCEDURE — 1101F PT FALLS ASSESS-DOCD LE1/YR: CPT | Mod: CPTII,S$GLB,, | Performed by: EMERGENCY MEDICINE

## 2018-09-17 PROCEDURE — 3079F DIAST BP 80-89 MM HG: CPT | Mod: CPTII,S$GLB,, | Performed by: EMERGENCY MEDICINE

## 2018-09-17 PROCEDURE — 96372 THER/PROPH/DIAG INJ SC/IM: CPT | Mod: S$GLB,,, | Performed by: EMERGENCY MEDICINE

## 2018-09-17 PROCEDURE — 99214 OFFICE O/P EST MOD 30 MIN: CPT | Mod: 25,S$GLB,, | Performed by: EMERGENCY MEDICINE

## 2018-09-17 RX ORDER — BENZONATATE 100 MG/1
100 CAPSULE ORAL 3 TIMES DAILY PRN
Qty: 21 CAPSULE | Refills: 0 | Status: SHIPPED | OUTPATIENT
Start: 2018-09-17 | End: 2018-09-24

## 2018-09-17 RX ORDER — BETAMETHASONE SODIUM PHOSPHATE AND BETAMETHASONE ACETATE 3; 3 MG/ML; MG/ML
9 INJECTION, SUSPENSION INTRA-ARTICULAR; INTRALESIONAL; INTRAMUSCULAR; SOFT TISSUE
Status: COMPLETED | OUTPATIENT
Start: 2018-09-17 | End: 2018-09-17

## 2018-09-17 RX ORDER — FLUTICASONE PROPIONATE 50 MCG
1 SPRAY, SUSPENSION (ML) NASAL 2 TIMES DAILY
Qty: 1 BOTTLE | Refills: 2 | Status: SHIPPED | OUTPATIENT
Start: 2018-09-17 | End: 2018-10-17

## 2018-09-17 RX ADMIN — BETAMETHASONE SODIUM PHOSPHATE AND BETAMETHASONE ACETATE 9 MG: 3; 3 INJECTION, SUSPENSION INTRA-ARTICULAR; INTRALESIONAL; INTRAMUSCULAR; SOFT TISSUE at 04:09

## 2018-09-17 NOTE — PATIENT INSTRUCTIONS
FLONASE NASAL SPRAY DAILY-TWICE DAILY  CLARITIN DAILY  TESSALON PERLES AS NEEDED FOR COUGH  1.5 CC CELESTONE GIVEN IN CLINIC   SEE ALLERGIC RHINITIS SHEET    FOLLOW UP WITH YOUR PCP  RETURN FOR ANY CONCERNS OR PROBLEMS  Allergic Rhinitis  Allergic rhinitis is an allergic reaction that affects the nose, and often the eyes. Its often known as nasal allergies. Nasal allergies are often due to things in the environment that are breathed in. Depending what you are sensitive to, nasal allergies may occur only during certain seasons. Or they may occur year round. Common indoor allergens include house dust mites, mold, cockroaches, and pet dander. Outdoor allergens include pollen from trees, grasses, and weeds.   Symptoms include a drippy, stuffy, and itchy nose. They also include sneezing and red and itchy eyes. You may feel tired more often. Severe allergies may also affect your breathing and trigger a condition called asthma.   Tests can be done to see what allergens are affecting you. You may be referred to an allergy specialist for testing and further evaluation.  Home care  Your healthcare provider may prescribe medicines to help relieve allergy symptoms. These may include oral medicines, nasal sprays, or eye drops.  Ask your provider for advice on how to avoid substances that you are allergic to. Below are a few tips for each type of allergen.  Pet dander:  · Do not have pets with fur and feathers.  · If you can't avoid having a pet, keep it out of your bedroom and off upholstered furniture.  Pollen:  · When pollen counts are high, keep windows of your car and home closed. If possible, use an air conditioner instead.  · Wear a filter mask when mowing or doing yard work.  House dust mites:  · Wash bedding every week in warm water and detergent and dry on a hot setting.  · Cover the mattress, box spring, and pillows with allergy covers.   · If possible, sleep in a room with no carpet, curtains, or upholstered  furniture.  Cockroaches:  · Store food in sealed containers.  · Remove garbage from the home promptly.  · Fix water leaks  Mold:  · Keep humidity low by using a dehumidifier or air conditioner. Keep the dehumidifier and air conditioner clean and free of mold.  · Clean moldy areas with bleach and water.  In general:  · Vacuum once or twice a week. If possible, use a vacuum with a high-efficiency particulate air (HEPA) filter.  · Do not smoke. Avoid cigarette smoke. Cigarette smoke is an irritant that can make symptoms worse.  Follow-up care  Follow up as advised by the healthcare provider or our staff. If you were referred to an allergy specialist, make this appointment promptly.  When to seek medical advice  Call your healthcare provider right away if the following occur:  · Coughing or wheezing  · Fever greater than 100.4°F (38°C)  · Hives (raised red bumps)  · Continuing symptoms, new symptoms, or worsening symptoms  Call 911 right away if you have:  · Trouble breathing  · Severe swelling of the face or severe itching of the eyes or mouth  Date Last Reviewed: 3/1/2017  © 8214-8951 [employer]. [address] [place] [address], [place] [address]. All rights reserved. This information is not intended as a substitute for professional medical care. Always follow your healthcare professional's instructions.

## 2018-09-17 NOTE — PROGRESS NOTES
"Subjective:       Patient ID: Jessenia Da Silva is a 78 y.o. female.    Vitals:    09/17/18 1543   BP: 133/84   Pulse: 88   Resp: 16   Temp: 99.8 °F (37.7 °C)   TempSrc: Oral   SpO2: 95%   Weight: 59.9 kg (132 lb)   Height: 5' 7" (1.702 m)       Chief Complaint: Sinus Problem    Pt states she has been coughing. Pt ran out of flonase. Pt states she had a cold 2 weeks ago. Pt was taking flonase and coricidin.  Pt states she started coughing 2 weeks ago.  REPORTS POST NASAL DRIP MAKING HER COUGH, DOES NOT FEEL ILL, HAS NO ACHES, NO FEVER, NO SOB, NO NAUSEA, NO CHEST PAIN, STATES HER POST NASAL DRIP IS MAKING HER COUGH, AT NIGHT IT IS WORSE THAN DURING DAY. NO CHANGE IN ROUTINE. HAS BEEN GOING ON FOR 3 WEKS AND NOT WORSE JUST PERSISTENT.      Sinus Problem   This is a recurrent problem. The current episode started 1 to 4 weeks ago. The problem has been waxing and waning since onset. There has been no fever. The fever has been present for less than 1 day. Associated symptoms include congestion and coughing. Pertinent negatives include no chills, ear pain, headaches, hoarse voice, shortness of breath, sinus pressure, sneezing or sore throat. Treatments tried: flonase and coricidin.   The treatment provided mild relief.     Review of Systems   Constitution: Negative for chills, fever and malaise/fatigue.   HENT: Positive for congestion. Negative for ear pain, hoarse voice, sinus pressure, sneezing and sore throat.    Eyes: Negative for discharge and redness.   Cardiovascular: Negative for chest pain, dyspnea on exertion and leg swelling.   Respiratory: Positive for cough and sputum production. Negative for shortness of breath and wheezing.    Musculoskeletal: Negative for myalgias.   Gastrointestinal: Negative for abdominal pain and nausea.   Neurological: Negative for headaches.       Objective:      Physical Exam   Constitutional: She is oriented to person, place, and time. She appears well-developed and well-nourished. " She is cooperative.  Non-toxic appearance. She does not appear ill. No distress.   HENT:   Head: Normocephalic and atraumatic.   Right Ear: Hearing, tympanic membrane, external ear and ear canal normal.   Left Ear: Hearing, tympanic membrane, external ear and ear canal normal.   Nose: No mucosal edema, rhinorrhea or nasal deformity. No epistaxis. Right sinus exhibits no maxillary sinus tenderness and no frontal sinus tenderness. Left sinus exhibits no maxillary sinus tenderness and no frontal sinus tenderness.   Mouth/Throat: Uvula is midline and mucous membranes are normal. No trismus in the jaw. Normal dentition. No uvula swelling. No posterior oropharyngeal erythema.   MILD NASAL CONGESTION AND POSTERIOR OP COBBLESTONING   Eyes: Conjunctivae and lids are normal. No scleral icterus.   Sclera clear bilat   Neck: Trachea normal, full passive range of motion without pain and phonation normal. Neck supple.   Cardiovascular: Normal rate, regular rhythm, normal heart sounds, intact distal pulses and normal pulses.   Pulmonary/Chest: Effort normal and breath sounds normal. No respiratory distress. She has no wheezes.   NO COUGHING SPELLS WHILE IN ROOM   Abdominal: Soft. Normal appearance and bowel sounds are normal. There is no tenderness.   Musculoskeletal: Normal range of motion. She exhibits no edema or deformity.   Neurological: She is alert and oriented to person, place, and time. She exhibits normal muscle tone. Coordination normal.   Skin: Skin is warm, dry and intact. She is not diaphoretic. No pallor.   Psychiatric: She has a normal mood and affect. Her speech is normal and behavior is normal. Cognition and memory are normal.   Nursing note and vitals reviewed.      Assessment:       1. Allergic rhinitis, unspecified seasonality, unspecified trigger    2. PND (post-nasal drip)    3. Cough        Plan:       Jessenia was seen today for sinus problem.    Diagnoses and all orders for this visit:    Allergic  rhinitis, unspecified seasonality, unspecified trigger    PND (post-nasal drip)    Cough    Other orders  -     betamethasone acetate-betamethasone sodium phosphate injection 9 mg; Inject 1.5 mLs (9 mg total) into the muscle one time.  -     fluticasone (FLONASE) 50 mcg/actuation nasal spray; 1 spray (50 mcg total) by Each Nare route 2 (two) times daily.  -     benzonatate (TESSALON PERLES) 100 MG capsule; Take 1 capsule (100 mg total) by mouth 3 (three) times daily as needed for Cough.          Patient Instructions   FLONASE NASAL SPRAY DAILY-TWICE DAILY  CLARITIN DAILY  TESSALON PERLES AS NEEDED FOR COUGH  1.5 CC CELESTONE GIVEN IN CLINIC   SEE ALLERGIC RHINITIS SHEET    FOLLOW UP WITH YOUR PCP  RETURN FOR ANY CONCERNS OR PROBLEMS  Allergic Rhinitis  Allergic rhinitis is an allergic reaction that affects the nose, and often the eyes. Its often known as nasal allergies. Nasal allergies are often due to things in the environment that are breathed in. Depending what you are sensitive to, nasal allergies may occur only during certain seasons. Or they may occur year round. Common indoor allergens include house dust mites, mold, cockroaches, and pet dander. Outdoor allergens include pollen from trees, grasses, and weeds.   Symptoms include a drippy, stuffy, and itchy nose. They also include sneezing and red and itchy eyes. You may feel tired more often. Severe allergies may also affect your breathing and trigger a condition called asthma.   Tests can be done to see what allergens are affecting you. You may be referred to an allergy specialist for testing and further evaluation.  Home care  Your healthcare provider may prescribe medicines to help relieve allergy symptoms. These may include oral medicines, nasal sprays, or eye drops.  Ask your provider for advice on how to avoid substances that you are allergic to. Below are a few tips for each type of allergen.  Pet dander:  · Do not have pets with fur and  feathers.  · If you can't avoid having a pet, keep it out of your bedroom and off upholstered furniture.  Pollen:  · When pollen counts are high, keep windows of your car and home closed. If possible, use an air conditioner instead.  · Wear a filter mask when mowing or doing yard work.  House dust mites:  · Wash bedding every week in warm water and detergent and dry on a hot setting.  · Cover the mattress, box spring, and pillows with allergy covers.   · If possible, sleep in a room with no carpet, curtains, or upholstered furniture.  Cockroaches:  · Store food in sealed containers.  · Remove garbage from the home promptly.  · Fix water leaks  Mold:  · Keep humidity low by using a dehumidifier or air conditioner. Keep the dehumidifier and air conditioner clean and free of mold.  · Clean moldy areas with bleach and water.  In general:  · Vacuum once or twice a week. If possible, use a vacuum with a high-efficiency particulate air (HEPA) filter.  · Do not smoke. Avoid cigarette smoke. Cigarette smoke is an irritant that can make symptoms worse.  Follow-up care  Follow up as advised by the healthcare provider or our staff. If you were referred to an allergy specialist, make this appointment promptly.  When to seek medical advice  Call your healthcare provider right away if the following occur:  · Coughing or wheezing  · Fever greater than 100.4°F (38°C)  · Hives (raised red bumps)  · Continuing symptoms, new symptoms, or worsening symptoms  Call 911 right away if you have:  · Trouble breathing  · Severe swelling of the face or severe itching of the eyes or mouth  Date Last Reviewed: 3/1/2017  © 7099-8094 [employer]. [address] [place] [address], [place] [address]. All rights reserved. This information is not intended as a substitute for professional medical care. Always follow your healthcare professional's instructions.

## 2018-10-04 DIAGNOSIS — I65.22 CAROTID ATHEROSCLEROSIS, LEFT: ICD-10-CM

## 2018-10-04 RX ORDER — PRAVASTATIN SODIUM 10 MG/1
10 TABLET ORAL EVERY MORNING
Qty: 90 TABLET | Refills: 0 | OUTPATIENT
Start: 2018-10-04

## 2018-10-09 DIAGNOSIS — I65.22 CAROTID ATHEROSCLEROSIS, LEFT: ICD-10-CM

## 2018-10-09 RX ORDER — PRAVASTATIN SODIUM 10 MG/1
10 TABLET ORAL EVERY MORNING
Qty: 90 TABLET | Refills: 0 | OUTPATIENT
Start: 2018-10-09

## 2018-10-18 DIAGNOSIS — I65.22 CAROTID ATHEROSCLEROSIS, LEFT: ICD-10-CM

## 2018-10-18 RX ORDER — PRAVASTATIN SODIUM 10 MG/1
10 TABLET ORAL EVERY MORNING
Qty: 90 TABLET | Refills: 0 | OUTPATIENT
Start: 2018-10-18

## 2018-11-26 ENCOUNTER — OFFICE VISIT (OUTPATIENT)
Dept: URGENT CARE | Facility: CLINIC | Age: 78
End: 2018-11-26
Payer: MEDICARE

## 2018-11-26 VITALS
RESPIRATION RATE: 16 BRPM | DIASTOLIC BLOOD PRESSURE: 82 MMHG | WEIGHT: 132 LBS | HEIGHT: 67 IN | TEMPERATURE: 97 F | OXYGEN SATURATION: 98 % | SYSTOLIC BLOOD PRESSURE: 140 MMHG | HEART RATE: 90 BPM | BODY MASS INDEX: 20.72 KG/M2

## 2018-11-26 DIAGNOSIS — R05.9 COUGH: ICD-10-CM

## 2018-11-26 DIAGNOSIS — J06.9 VIRAL URI WITH COUGH: Primary | ICD-10-CM

## 2018-11-26 PROCEDURE — 1101F PT FALLS ASSESS-DOCD LE1/YR: CPT | Mod: CPTII,S$GLB,, | Performed by: NURSE PRACTITIONER

## 2018-11-26 PROCEDURE — 3077F SYST BP >= 140 MM HG: CPT | Mod: CPTII,S$GLB,, | Performed by: NURSE PRACTITIONER

## 2018-11-26 PROCEDURE — 3079F DIAST BP 80-89 MM HG: CPT | Mod: CPTII,S$GLB,, | Performed by: NURSE PRACTITIONER

## 2018-11-26 PROCEDURE — 71046 X-RAY EXAM CHEST 2 VIEWS: CPT | Mod: S$GLB,,, | Performed by: RADIOLOGY

## 2018-11-26 PROCEDURE — 99214 OFFICE O/P EST MOD 30 MIN: CPT | Mod: S$GLB,,, | Performed by: NURSE PRACTITIONER

## 2018-11-26 RX ORDER — BENZONATATE 200 MG/1
200 CAPSULE ORAL 3 TIMES DAILY PRN
Qty: 30 CAPSULE | Refills: 0 | Status: SHIPPED | OUTPATIENT
Start: 2018-11-26 | End: 2018-11-26

## 2018-11-26 RX ORDER — BENZONATATE 100 MG/1
100 CAPSULE ORAL EVERY 6 HOURS PRN
Qty: 30 CAPSULE | Refills: 1 | Status: SHIPPED | OUTPATIENT
Start: 2018-11-26 | End: 2019-03-27

## 2018-11-26 RX ORDER — FLUTICASONE PROPIONATE 50 MCG
2 SPRAY, SUSPENSION (ML) NASAL DAILY
Qty: 1 BOTTLE | Refills: 0 | Status: SHIPPED | OUTPATIENT
Start: 2018-11-26 | End: 2019-04-11 | Stop reason: SDUPTHER

## 2018-11-26 NOTE — PROGRESS NOTES
"Subjective:       Patient ID: Jessenia Da Silva is a 78 y.o. female.    Vitals:    11/26/18 1321   BP: (!) 140/82   Pulse: 90   Resp: 16   Temp: 97.4 °F (36.3 °C)   TempSrc: Oral   SpO2: 98%   Weight: 59.9 kg (132 lb)   Height: 5' 7" (1.702 m)       Chief Complaint: URI    Pt c/o having congestion and a productive cough for a week now.  Pt states she had something similar episode a month ago and it got better, but has now came back. Pt states she is worried about pneumonia, but she says that she feels fine and she actually was not even going to come in today that she was running errands and decided to come get her cough checked out.  Patient has no shortness of breath wheezing fever or chest pain cough is sometimes productive but states that it is clear to sometimes white. Pt took coricidin for the cough and flonase with some relief.      URI    This is a new problem. The current episode started in the past 7 days. The problem has been unchanged. There has been no fever. The fever has been present for less than 1 day. Associated symptoms include congestion and coughing. Pertinent negatives include no abdominal pain, chest pain, ear pain, headaches, nausea, rhinorrhea, sneezing, sore throat or wheezing. She has tried decongestant for the symptoms. The treatment provided no relief.     Review of Systems   Constitution: Negative for chills, fever and malaise/fatigue.   HENT: Positive for congestion. Negative for ear pain, hoarse voice, rhinorrhea, sneezing and sore throat.    Eyes: Negative for discharge and redness.   Cardiovascular: Negative for chest pain, dyspnea on exertion and leg swelling.   Respiratory: Positive for cough and sputum production. Negative for shortness of breath and wheezing.    Musculoskeletal: Negative for myalgias.   Gastrointestinal: Negative for abdominal pain and nausea.   Neurological: Negative for headaches.       Objective:      Physical Exam   Constitutional: She is oriented to person, " place, and time. She appears well-developed and well-nourished. She is cooperative.  Non-toxic appearance. She does not have a sickly appearance. She does not appear ill. No distress.   HENT:   Head: Normocephalic and atraumatic.   Right Ear: Hearing, tympanic membrane, external ear and ear canal normal.   Left Ear: Hearing, tympanic membrane, external ear and ear canal normal.   Nose: Mucosal edema present. No rhinorrhea or nasal deformity. No epistaxis. Right sinus exhibits no maxillary sinus tenderness and no frontal sinus tenderness. Left sinus exhibits no maxillary sinus tenderness and no frontal sinus tenderness.   Mouth/Throat: Uvula is midline, oropharynx is clear and moist and mucous membranes are normal. No trismus in the jaw. Normal dentition. No uvula swelling. No oropharyngeal exudate, posterior oropharyngeal edema or posterior oropharyngeal erythema.   Eyes: Conjunctivae and lids are normal. No scleral icterus.   Sclera clear bilat   Neck: Trachea normal, full passive range of motion without pain and phonation normal. Neck supple.   Cardiovascular: Normal rate, regular rhythm, normal heart sounds, intact distal pulses and normal pulses.   Pulmonary/Chest: Effort normal and breath sounds normal. No respiratory distress. She has no wheezes.   Abdominal: Soft. Normal appearance and bowel sounds are normal. She exhibits no distension. There is no tenderness.   Musculoskeletal: Normal range of motion. She exhibits no edema or deformity.   Lymphadenopathy:     She has no cervical adenopathy.   Neurological: She is alert and oriented to person, place, and time. She exhibits normal muscle tone. Coordination normal.   Skin: Skin is warm, dry and intact. She is not diaphoretic. No pallor.   Psychiatric: She has a normal mood and affect. Her speech is normal and behavior is normal. Judgment and thought content normal. Cognition and memory are normal.   Nursing note and vitals reviewed.      X-ray Chest Pa And  Lateral    Result Date: 11/26/2018  EXAMINATION: XR CHEST PA AND LATERAL CLINICAL HISTORY: Cough TECHNIQUE: PA and lateral views of the chest were performed. COMPARISON: None FINDINGS: Senescent costochondral calcifications noted.The lungs are clear, with normal appearance of pulmonary vasculature and no pleural effusion or pneumothorax. The cardiac silhouette is normal in size. Hilar regions are within normal limits.  There is calcific atherosclerosis with tortuosity of the thoracic aorta. Osseous structures show levocurvature of the thoracolumbar spine and minimal degenerative change without acute or destructive process seen.     No radiographic acute intrathoracic process seen.  Specifically, no focal consolidation. Electronically signed by: Omega Becker MD Date:    11/26/2018 Time:    13:57  Assessment:       1. Viral URI with cough    2. Cough        Plan:       Jessenia was seen today for uri.    Diagnoses and all orders for this visit:    Viral URI with cough  -     benzonatate (TESSALON) 200 MG capsule; Take 1 capsule (200 mg total) by mouth 3 (three) times daily as needed for Cough.  -     fluticasone (FLONASE) 50 mcg/actuation nasal spray; 2 sprays (100 mcg total) by Each Nare route once daily.    Cough  -     X-Ray Chest PA And Lateral; Future      Patient Instructions                                                            URI   If your condition worsens or fails to improve we recommend that you receive another evaluation at the ER immediately or contact your PCP to discuss your concerns or return here. You must understand that you've received an urgent care treatment only and that you may be released before all your medical problems are known or treated. You the patient will arrange for follouwp care as instructed.   If we discussed that I think your illness is viral, it will not respond to antibiotics and will last 10-14 days.  Flonase (fluticasone) is a nasal spray which is available over the  "counter and may help with your symptoms.   Zyrtec D, Claritin D or Allegra D can also help with symptoms of congestion and drainage.   If you have hypertension avoid using the "D" which is the decongestant   If you just have drainage you can take plain zyrtec, claritin or allegra   If you just have a congested feeling you can take pseudoephedrine (unless you have high blood pressure) which you have to sign for behind the counter. Do not buy the phenylephrine which is on the shelf as it is not effective   Rest and fluids are also important.   Tylenol or ibuprofen can also be used as directed for pain unless you have an allergy to them or medical condition such as stomach ulcers, kidney or liver disease or blood thinners etc for which you should not be taking these type of medications.   If you are flying in the next few days Afrin nose drops for the airplane flight upon take off and landing may help. Other than at those times refrain from using afrin.   If you were prescribed a narcotic do not drive or operate heavy machinery while taking these medications.       Viral Upper Respiratory Illness (Adult)  You have a viral upper respiratory illness (URI), which is another term for the common cold. This illness is contagious during the first few days. It is spread through the air by coughing and sneezing. It may also be spread by direct contact (touching the sick person and then touching your own eyes, nose, or mouth). Frequent handwashing will decrease risk of spread. Most viral illnesses go away within 7 to 10 days with rest and simple home remedies. Sometimes the illness may last for several weeks. Antibiotics will not kill a virus, and they are generally not prescribed for this condition.    Home care  · If symptoms are severe, rest at home for the first 2 to 3 days. When you resume activity, don't let yourself get too tired.  · Avoid being exposed to cigarette smoke (yours or others).  · You may use acetaminophen " or ibuprofen to control pain and fever, unless another medicine was prescribed. (Note: If you have chronic liver or kidney disease, have ever had a stomach ulcer or gastrointestinal bleeding, or are taking blood-thinning medicines, talk with your healthcare provider before using these medicines.) Aspirin should never be given to anyone under 18 years of age who is ill with a viral infection or fever. It may cause severe liver or brain damage.  · Your appetite may be poor, so a light diet is fine. Avoid dehydration by drinking 6 to 8 glasses of fluids per day (water, soft drinks, juices, tea, or soup). Extra fluids will help loosen secretions in the nose and lungs.  · Over-the-counter cold medicines will not shorten the length of time youre sick, but they may be helpful for the following symptoms: cough, sore throat, and nasal and sinus congestion. (Note: Do not use decongestants if you have high blood pressure.)  Follow-up care  Follow up with your healthcare provider, or as advised.  When to seek medical advice  Call your healthcare provider right away if any of these occur:  · Cough with lots of colored sputum (mucus)  · Severe headache; face, neck, or ear pain  · Difficulty swallowing due to throat pain  · Fever of 100.4°F (38°C)  Call 911, or get immediate medical care  Call emergency services right away if any of these occur:  · Chest pain, shortness of breath, wheezing, or difficulty breathing  · Coughing up blood  · Inability to swallow due to throat pain  Date Last Reviewed: 9/13/2015  © 5166-6619 [employer]. [address] [place] [address], [place] [address]. All rights reserved. This information is not intended as a substitute for professional medical care. Always follow your healthcare professional's instructions.

## 2018-11-26 NOTE — PATIENT INSTRUCTIONS
"                                                         URI   If your condition worsens or fails to improve we recommend that you receive another evaluation at the ER immediately or contact your PCP to discuss your concerns or return here. You must understand that you've received an urgent care treatment only and that you may be released before all your medical problems are known or treated. You the patient will arrange for follouwp care as instructed.   If we discussed that I think your illness is viral, it will not respond to antibiotics and will last 10-14 days.  Flonase (fluticasone) is a nasal spray which is available over the counter and may help with your symptoms.   Zyrtec D, Claritin D or Allegra D can also help with symptoms of congestion and drainage.   If you have hypertension avoid using the "D" which is the decongestant   If you just have drainage you can take plain zyrtec, claritin or allegra   If you just have a congested feeling you can take pseudoephedrine (unless you have high blood pressure) which you have to sign for behind the counter. Do not buy the phenylephrine which is on the shelf as it is not effective   Rest and fluids are also important.   Tylenol or ibuprofen can also be used as directed for pain unless you have an allergy to them or medical condition such as stomach ulcers, kidney or liver disease or blood thinners etc for which you should not be taking these type of medications.   If you are flying in the next few days Afrin nose drops for the airplane flight upon take off and landing may help. Other than at those times refrain from using afrin.   If you were prescribed a narcotic do not drive or operate heavy machinery while taking these medications.       Viral Upper Respiratory Illness (Adult)  You have a viral upper respiratory illness (URI), which is another term for the common cold. This illness is contagious during the first few days. It is spread through the air by coughing " and sneezing. It may also be spread by direct contact (touching the sick person and then touching your own eyes, nose, or mouth). Frequent handwashing will decrease risk of spread. Most viral illnesses go away within 7 to 10 days with rest and simple home remedies. Sometimes the illness may last for several weeks. Antibiotics will not kill a virus, and they are generally not prescribed for this condition.    Home care  · If symptoms are severe, rest at home for the first 2 to 3 days. When you resume activity, don't let yourself get too tired.  · Avoid being exposed to cigarette smoke (yours or others).  · You may use acetaminophen or ibuprofen to control pain and fever, unless another medicine was prescribed. (Note: If you have chronic liver or kidney disease, have ever had a stomach ulcer or gastrointestinal bleeding, or are taking blood-thinning medicines, talk with your healthcare provider before using these medicines.) Aspirin should never be given to anyone under 18 years of age who is ill with a viral infection or fever. It may cause severe liver or brain damage.  · Your appetite may be poor, so a light diet is fine. Avoid dehydration by drinking 6 to 8 glasses of fluids per day (water, soft drinks, juices, tea, or soup). Extra fluids will help loosen secretions in the nose and lungs.  · Over-the-counter cold medicines will not shorten the length of time youre sick, but they may be helpful for the following symptoms: cough, sore throat, and nasal and sinus congestion. (Note: Do not use decongestants if you have high blood pressure.)  Follow-up care  Follow up with your healthcare provider, or as advised.  When to seek medical advice  Call your healthcare provider right away if any of these occur:  · Cough with lots of colored sputum (mucus)  · Severe headache; face, neck, or ear pain  · Difficulty swallowing due to throat pain  · Fever of 100.4°F (38°C)  Call 911, or get immediate medical care  Call  emergency services right away if any of these occur:  · Chest pain, shortness of breath, wheezing, or difficulty breathing  · Coughing up blood  · Inability to swallow due to throat pain  Date Last Reviewed: 9/13/2015  © 3296-9635 [employer]. [address] [place] [address], [place] [address]. All rights reserved. This information is not intended as a substitute for professional medical care. Always follow your healthcare professional's instructions.

## 2018-12-14 DIAGNOSIS — E55.9 VITAMIN D DEFICIENCY: ICD-10-CM

## 2018-12-14 RX ORDER — ERGOCALCIFEROL 1.25 MG/1
50000 CAPSULE ORAL
Qty: 36 CAPSULE | Refills: 3 | OUTPATIENT
Start: 2018-12-14

## 2019-01-28 ENCOUNTER — TELEPHONE (OUTPATIENT)
Dept: INTERNAL MEDICINE | Facility: CLINIC | Age: 79
End: 2019-01-28

## 2019-01-28 DIAGNOSIS — I11.9 HYPERTENSIVE HEART DISEASE WITHOUT HEART FAILURE: Primary | ICD-10-CM

## 2019-01-28 DIAGNOSIS — E55.9 VITAMIN D DEFICIENCY: ICD-10-CM

## 2019-01-28 NOTE — TELEPHONE ENCOUNTER
----- Message from Cindy Gilmore sent at 1/28/2019  3:06 PM CST -----  Contact: [phone]  Patient requesting a call from the office to discuss vitamin d medication . Please call and advise, Thanks

## 2019-01-28 NOTE — TELEPHONE ENCOUNTER
Her current Rx should not run out until the end of March. She is scheduled for a visit at that time, may schedule fasting labs in advance. Will address her concerns then.

## 2019-01-29 NOTE — TELEPHONE ENCOUNTER
Pt was taking Vit D 25,000 unit OTC for 2 weeks insurance will now pay for the prescription Vit D pt stop taking the Vit D OTC on Sunday pt is asking when should she start the prescribed Vit D 50,000 units   Pt is asking can start on Monday

## 2019-02-07 ENCOUNTER — OFFICE VISIT (OUTPATIENT)
Dept: URGENT CARE | Facility: CLINIC | Age: 79
End: 2019-02-07
Payer: MEDICARE

## 2019-02-07 VITALS
BODY MASS INDEX: 20.72 KG/M2 | SYSTOLIC BLOOD PRESSURE: 128 MMHG | DIASTOLIC BLOOD PRESSURE: 76 MMHG | HEIGHT: 67 IN | RESPIRATION RATE: 18 BRPM | WEIGHT: 132 LBS | HEART RATE: 81 BPM | TEMPERATURE: 99 F

## 2019-02-07 DIAGNOSIS — R52 GENERALIZED BODY ACHES: ICD-10-CM

## 2019-02-07 DIAGNOSIS — J06.9 VIRAL URI WITH COUGH: Primary | ICD-10-CM

## 2019-02-07 LAB
CTP QC/QA: YES
FLUAV AG NPH QL: NEGATIVE
FLUBV AG NPH QL: NEGATIVE

## 2019-02-07 PROCEDURE — 3078F PR MOST RECENT DIASTOLIC BLOOD PRESSURE < 80 MM HG: ICD-10-PCS | Mod: CPTII,S$GLB,, | Performed by: EMERGENCY MEDICINE

## 2019-02-07 PROCEDURE — 99214 OFFICE O/P EST MOD 30 MIN: CPT | Mod: S$GLB,,, | Performed by: EMERGENCY MEDICINE

## 2019-02-07 PROCEDURE — 1101F PR PT FALLS ASSESS DOC 0-1 FALLS W/OUT INJ PAST YR: ICD-10-PCS | Mod: CPTII,S$GLB,, | Performed by: EMERGENCY MEDICINE

## 2019-02-07 PROCEDURE — 3074F SYST BP LT 130 MM HG: CPT | Mod: CPTII,S$GLB,, | Performed by: EMERGENCY MEDICINE

## 2019-02-07 PROCEDURE — 1101F PT FALLS ASSESS-DOCD LE1/YR: CPT | Mod: CPTII,S$GLB,, | Performed by: EMERGENCY MEDICINE

## 2019-02-07 PROCEDURE — 99214 PR OFFICE/OUTPT VISIT, EST, LEVL IV, 30-39 MIN: ICD-10-PCS | Mod: S$GLB,,, | Performed by: EMERGENCY MEDICINE

## 2019-02-07 PROCEDURE — 3078F DIAST BP <80 MM HG: CPT | Mod: CPTII,S$GLB,, | Performed by: EMERGENCY MEDICINE

## 2019-02-07 PROCEDURE — 87804 POCT INFLUENZA A/B: ICD-10-PCS | Mod: 59,QW,S$GLB, | Performed by: EMERGENCY MEDICINE

## 2019-02-07 PROCEDURE — 87804 INFLUENZA ASSAY W/OPTIC: CPT | Mod: QW,S$GLB,, | Performed by: EMERGENCY MEDICINE

## 2019-02-07 PROCEDURE — 3074F PR MOST RECENT SYSTOLIC BLOOD PRESSURE < 130 MM HG: ICD-10-PCS | Mod: CPTII,S$GLB,, | Performed by: EMERGENCY MEDICINE

## 2019-02-07 NOTE — PATIENT INSTRUCTIONS
Flu swab is negative  Continue to take your Coricidin HBP for cough/cold  Only fill the [facility] if not very much improved in 2-3 days  Review viral upper respiratory infection sheet  Hydrate with plenty of fluids  Take Tylenol 650 mg for body aches or sore throat or sinus pain.      Viral Upper Respiratory Illness (Adult)  You have a viral upper respiratory illness (URI), which is another term for the common cold. This illness is contagious during the first few days. It is spread through the air by coughing and sneezing. It may also be spread by direct contact (touching the sick person and then touching your own eyes, nose, or mouth). Frequent handwashing will decrease risk of spread. Most viral illnesses go away within 7 to 10 days with rest and simple home remedies. Sometimes the illness may last for several weeks. Antibiotics will not kill a virus, and they are generally not prescribed for this condition.    Home care  · If symptoms are severe, rest at home for the first 2 to 3 days. When you resume activity, don't let yourself get too tired.  · Avoid being exposed to cigarette smoke (yours or others).  · You may use acetaminophen or ibuprofen to control pain and fever, unless another medicine was prescribed. (Note: If you have chronic liver or kidney disease, have ever had a stomach ulcer or gastrointestinal bleeding, or are taking blood-thinning medicines, talk with your healthcare provider before using these medicines.) Aspirin should never be given to anyone under 18 years of age who is ill with a viral infection or fever. It may cause severe liver or brain damage.  · Your appetite may be poor, so a light diet is fine. Avoid dehydration by drinking 6 to 8 glasses of fluids per day (water, soft drinks, juices, tea, or soup). Extra fluids will help loosen secretions in the nose and lungs.  · Over-the-counter cold medicines will not shorten the length of time youre sick, but they may be helpful for the following  symptoms: cough, sore throat, and nasal and sinus congestion. (Note: Do not use decongestants if you have high blood pressure.)  Follow-up care  Follow up with your healthcare provider, or as advised.  When to seek medical advice  Call your healthcare provider right away if any of these occur:  · Cough with lots of colored sputum (mucus)  · Severe headache; face, neck, or ear pain  · Difficulty swallowing due to throat pain  · Fever of 100.4°F (38°C)  Call 911, or get immediate medical care  Call emergency services right away if any of these occur:  · Chest pain, shortness of breath, wheezing, or difficulty breathing  · Coughing up blood  · Inability to swallow due to throat pain  Date Last Reviewed: 9/13/2015  © 4862-3231 [employer]. [address] [place] [address], [place] [address]. All rights reserved. This information is not intended as a substitute for professional medical care. Always follow your healthcare professional's instructions.

## 2019-02-07 NOTE — PROGRESS NOTES
"Subjective:       Patient ID: Jessenia Da Silva is a 79 y.o. female.    Vitals:  height is 5' 7" (1.702 m) and weight is 59.9 kg (132 lb). Her temperature is 99.2 °F (37.3 °C). Her blood pressure is 128/76 and her pulse is 81. Her respiration is 18.     Chief Complaint: Nasal Congestion (pt states symptoms started on Tuesday); Cough; and Generalized Body Aches    Cough   This is a new problem. The current episode started in the past 7 days. The problem has been gradually worsening. The problem occurs every few minutes. The cough is productive of sputum. Associated symptoms include myalgias and a sore throat. Pertinent negatives include no chills, ear pain, eye redness, hemoptysis, rash, shortness of breath or wheezing. She has tried OTC cough suppressant for the symptoms. The treatment provided mild relief.       Constitution: Negative for chills, sweating and fatigue.   HENT: Positive for congestion, sinus pain, sinus pressure and sore throat. Negative for ear pain and voice change.    Neck: Negative for painful lymph nodes.   Eyes: Negative for eye redness.   Respiratory: Positive for cough and sputum production. Negative for chest tightness, bloody sputum, COPD, shortness of breath, stridor, wheezing and asthma.    Gastrointestinal: Negative for nausea and vomiting.   Musculoskeletal: Positive for muscle ache.   Skin: Negative for rash.   Allergic/Immunologic: Negative for seasonal allergies and asthma.   Hematologic/Lymphatic: Negative for swollen lymph nodes.       Objective:      Physical Exam   Constitutional: She is oriented to person, place, and time. She appears well-developed and well-nourished. She is cooperative.  Non-toxic appearance. She does not appear ill. No distress.   HENT:   Head: Normocephalic and atraumatic.   Right Ear: Hearing, tympanic membrane, external ear and ear canal normal.   Left Ear: Hearing, tympanic membrane, external ear and ear canal normal.   Nose: No mucosal edema, rhinorrhea " or nasal deformity. No epistaxis. Right sinus exhibits no maxillary sinus tenderness and no frontal sinus tenderness. Left sinus exhibits no maxillary sinus tenderness and no frontal sinus tenderness.   Mouth/Throat: Uvula is midline and mucous membranes are normal. No trismus in the jaw. Normal dentition. No uvula swelling. No posterior oropharyngeal erythema.   Mild nasal congestion   Eyes: Conjunctivae and lids are normal. No scleral icterus.   Sclera clear bilat   Neck: Trachea normal, normal range of motion, full passive range of motion without pain and phonation normal. Neck supple.   Cardiovascular: Normal rate, regular rhythm, normal heart sounds, intact distal pulses and normal pulses.   Pulmonary/Chest: Effort normal and breath sounds normal. No respiratory distress.   No cough, lungs clear   Abdominal: Soft. Normal appearance and bowel sounds are normal. There is no tenderness.   Musculoskeletal: Normal range of motion. She exhibits no edema or deformity.   Neurological: She is alert and oriented to person, place, and time. She exhibits normal muscle tone. Coordination normal.   Skin: Skin is warm, dry and intact. She is not diaphoretic. No pallor.   Psychiatric: She has a normal mood and affect. Her speech is normal and behavior is normal. Cognition and memory are normal.   Nursing note and vitals reviewed.      Office Visit on 02/07/2019   Component Date Value Ref Range Status    Rapid Influenza A Ag 02/07/2019 Negative  Negative Final    Rapid Influenza B Ag 02/07/2019 Negative  Negative Final     Acceptable 02/07/2019 Yes   Final       Assessment:       1. Viral URI with cough    2. Generalized body aches        Plan:         Viral URI with cough    Generalized body aches  -     POCT Influenza A/B          Patient Instructions   Flu swab is negative  Continue to take your Coricidin HBP for cough/cold  Only fill the [facility] if not very much improved in 2-3 days  Review viral upper  respiratory infection sheet  Hydrate with plenty of fluids  Take Tylenol 650 mg for body aches or sore throat or sinus pain.      Viral Upper Respiratory Illness (Adult)  You have a viral upper respiratory illness (URI), which is another term for the common cold. This illness is contagious during the first few days. It is spread through the air by coughing and sneezing. It may also be spread by direct contact (touching the sick person and then touching your own eyes, nose, or mouth). Frequent handwashing will decrease risk of spread. Most viral illnesses go away within 7 to 10 days with rest and simple home remedies. Sometimes the illness may last for several weeks. Antibiotics will not kill a virus, and they are generally not prescribed for this condition.    Home care  · If symptoms are severe, rest at home for the first 2 to 3 days. When you resume activity, don't let yourself get too tired.  · Avoid being exposed to cigarette smoke (yours or others).  · You may use acetaminophen or ibuprofen to control pain and fever, unless another medicine was prescribed. (Note: If you have chronic liver or kidney disease, have ever had a stomach ulcer or gastrointestinal bleeding, or are taking blood-thinning medicines, talk with your healthcare provider before using these medicines.) Aspirin should never be given to anyone under 18 years of age who is ill with a viral infection or fever. It may cause severe liver or brain damage.  · Your appetite may be poor, so a light diet is fine. Avoid dehydration by drinking 6 to 8 glasses of fluids per day (water, soft drinks, juices, tea, or soup). Extra fluids will help loosen secretions in the nose and lungs.  · Over-the-counter cold medicines will not shorten the length of time youre sick, but they may be helpful for the following symptoms: cough, sore throat, and nasal and sinus congestion. (Note: Do not use decongestants if you have high blood pressure.)  Follow-up care  Follow  up with your healthcare provider, or as advised.  When to seek medical advice  Call your healthcare provider right away if any of these occur:  · Cough with lots of colored sputum (mucus)  · Severe headache; face, neck, or ear pain  · Difficulty swallowing due to throat pain  · Fever of 100.4°F (38°C)  Call 911, or get immediate medical care  Call emergency services right away if any of these occur:  · Chest pain, shortness of breath, wheezing, or difficulty breathing  · Coughing up blood  · Inability to swallow due to throat pain  Date Last Reviewed: 9/13/2015  © 7049-7301 [employer]. [address] [place] [address], [place] [address]. All rights reserved. This information is not intended as a substitute for professional medical care. Always follow your healthcare professional's instructions.

## 2019-03-24 ENCOUNTER — OFFICE VISIT (OUTPATIENT)
Dept: URGENT CARE | Facility: CLINIC | Age: 79
End: 2019-03-24
Payer: MEDICARE

## 2019-03-24 VITALS
RESPIRATION RATE: 16 BRPM | HEIGHT: 67 IN | TEMPERATURE: 98 F | HEART RATE: 72 BPM | OXYGEN SATURATION: 98 % | WEIGHT: 132 LBS | SYSTOLIC BLOOD PRESSURE: 131 MMHG | DIASTOLIC BLOOD PRESSURE: 76 MMHG | BODY MASS INDEX: 20.72 KG/M2

## 2019-03-24 DIAGNOSIS — S60.021A CONTUSION OF RIGHT INDEX FINGER WITHOUT DAMAGE TO NAIL, INITIAL ENCOUNTER: Primary | ICD-10-CM

## 2019-03-24 DIAGNOSIS — S69.91XA INJURY OF FINGER OF RIGHT HAND, INITIAL ENCOUNTER: ICD-10-CM

## 2019-03-24 PROCEDURE — 1101F PR PT FALLS ASSESS DOC 0-1 FALLS W/OUT INJ PAST YR: ICD-10-PCS | Mod: CPTII,S$GLB,, | Performed by: NURSE PRACTITIONER

## 2019-03-24 PROCEDURE — 1101F PT FALLS ASSESS-DOCD LE1/YR: CPT | Mod: CPTII,S$GLB,, | Performed by: NURSE PRACTITIONER

## 2019-03-24 PROCEDURE — 73140 X-RAY EXAM OF FINGER(S): CPT | Mod: RT,S$GLB,, | Performed by: RADIOLOGY

## 2019-03-24 PROCEDURE — 73140 XR FINGER 2 OR MORE VIEWS RIGHT: ICD-10-PCS | Mod: RT,S$GLB,, | Performed by: RADIOLOGY

## 2019-03-24 PROCEDURE — 3078F PR MOST RECENT DIASTOLIC BLOOD PRESSURE < 80 MM HG: ICD-10-PCS | Mod: CPTII,S$GLB,, | Performed by: NURSE PRACTITIONER

## 2019-03-24 PROCEDURE — 3078F DIAST BP <80 MM HG: CPT | Mod: CPTII,S$GLB,, | Performed by: NURSE PRACTITIONER

## 2019-03-24 PROCEDURE — 99214 OFFICE O/P EST MOD 30 MIN: CPT | Mod: S$GLB,,, | Performed by: NURSE PRACTITIONER

## 2019-03-24 PROCEDURE — 3075F PR MOST RECENT SYSTOLIC BLOOD PRESS GE 130-139MM HG: ICD-10-PCS | Mod: CPTII,S$GLB,, | Performed by: NURSE PRACTITIONER

## 2019-03-24 PROCEDURE — 99214 PR OFFICE/OUTPT VISIT, EST, LEVL IV, 30-39 MIN: ICD-10-PCS | Mod: S$GLB,,, | Performed by: NURSE PRACTITIONER

## 2019-03-24 PROCEDURE — 3075F SYST BP GE 130 - 139MM HG: CPT | Mod: CPTII,S$GLB,, | Performed by: NURSE PRACTITIONER

## 2019-03-24 NOTE — PROGRESS NOTES
"Subjective:       Patient ID: Jessenia Da Silva is a 79 y.o. female.    Vitals:  height is 5' 7" (1.702 m) and weight is 59.9 kg (132 lb). Her tympanic temperature is 98.3 °F (36.8 °C). Her blood pressure is 131/76 and her pulse is 72. Her respiration is 16 and oxygen saturation is 98%.     Chief Complaint: Finger Injury (right index finger )    Pt is here for a finger injury. She smashed her right index finger in her car door yesterday.  States that she would like a splint because she keeps hitting it on things and it hurts worse.  She took Tylenol last night and had relief of pain.  No numbness or tingling.  There is bruising and there were some abrasions that were bleeding last night, but no longer bleeding.  Tetanus UTD.    Hand Injury    Her dominant hand is their right hand. The incident occurred 12 to 24 hours ago. The incident occurred at home. Injury mechanism: smashed finger in car door. The pain is present in the right hand (right index finger). The pain does not radiate. The pain is at a severity of 5/10. The pain is mild. Pertinent negatives include no chest pain, muscle weakness, numbness or tingling. The symptoms are aggravated by palpation. She has tried acetaminophen, elevation and ice for the symptoms. The treatment provided moderate relief.       Constitution: Negative for chills, fatigue and fever.   HENT: Negative for congestion and sore throat.    Neck: Negative for painful lymph nodes.   Cardiovascular: Negative for chest pain and leg swelling.   Eyes: Negative for double vision and blurred vision.   Respiratory: Negative for cough and shortness of breath.    Gastrointestinal: Negative for nausea, vomiting and diarrhea.   Genitourinary: Negative for dysuria, frequency, urgency and history of kidney stones.   Musculoskeletal: Positive for trauma and joint pain. Negative for joint swelling, muscle cramps and muscle ache.   Skin: Positive for abrasion. Negative for color change, pale, rash, " erythema and bruising.   Allergic/Immunologic: Negative for seasonal allergies.   Neurological: Negative for dizziness, history of vertigo, light-headedness, passing out, headaches and numbness.   Hematologic/Lymphatic: Negative for swollen lymph nodes.   Psychiatric/Behavioral: Negative for nervous/anxious, sleep disturbance and depression. The patient is not nervous/anxious.        Objective:      Physical Exam   Constitutional: She is oriented to person, place, and time. She appears well-developed and well-nourished.   HENT:   Head: Normocephalic and atraumatic. Head is without abrasion, without contusion and without laceration.   Right Ear: External ear normal.   Left Ear: External ear normal.   Nose: Nose normal.   Mouth/Throat: Oropharynx is clear and moist.   Eyes: Pupils are equal, round, and reactive to light. Conjunctivae, EOM and lids are normal.   Neck: Trachea normal, full passive range of motion without pain and phonation normal. Neck supple.   Cardiovascular: Normal rate, regular rhythm, normal heart sounds and intact distal pulses.   Pulmonary/Chest: Effort normal and breath sounds normal. No stridor. No respiratory distress.   Musculoskeletal: Normal range of motion.        Right hand: She exhibits bony tenderness and swelling. She exhibits normal range of motion, no tenderness, normal two-point discrimination, normal capillary refill, no deformity and no laceration. Normal sensation noted. Normal strength noted.        Hands:  Neurological: She is alert and oriented to person, place, and time.   Skin: Skin is warm, dry and intact. Capillary refill takes less than 2 seconds. No abrasion, no bruising, no burn, no ecchymosis, no laceration, no lesion and no rash noted. No erythema.   Psychiatric: She has a normal mood and affect. Her speech is normal and behavior is normal. Judgment and thought content normal. Cognition and memory are normal.   Nursing note and vitals reviewed.        X-ray Finger 2  Or More Views Right    Result Date: 3/24/2019  EXAMINATION: XR FINGER 2 OR MORE VIEWS RIGHT CLINICAL HISTORY: Unspecified injury of right wrist, hand and finger(s), initial encounter TECHNIQUE: Right index finger, three views COMPARISON: None FINDINGS: No evidence of fracture or dislocation.  No evidence of erosions.  Joint spaces are satisfactorily maintained.  No radiopaque foreign body.  No soft tissue abnormality.     No evidence of fracture. Electronically signed by: Duy Solano MD Date:    03/24/2019 Time:    12:22      Finger cage to right index finger for protection with non stick dressing.  Patient tolerated well.  Performed by MA.  Neurovascularly intact pre and post application.    Assessment:       1. Contusion of right index finger without damage to nail, initial encounter    2. Injury of finger of right hand, initial encounter        Plan:         Contusion of right index finger without damage to nail, initial encounter  -     SPLINT FOR HOME USE    Injury of finger of right hand, initial encounter  -     X-Ray Finger 2 or More Views Right; Future; Expected date: 03/24/2019  -     SPLINT FOR HOME USE      Patient Instructions   Splint as needed for protection.  Rest.  Fluids.  Elevation.  Ice.  Tylenol take as directed as needed for pain.  Keep follow up with your PCP on Wednesday.  Follow up with ortho as needed.  Return here or go to the ER for any worsening symptoms.    Finger Contusion  You have a contusion. This is also called a bruise. There is swelling and some bleeding under the skin, but no broken bones. This injury generally takes a few days to a few weeks to heal. During that time, the bruise will typically change in color from reddish, to purple-blue, to greenish-yellow, then to yellow-brown.  A finger contusion may be treated with a splint or buddy tape (taping the injured finger to the one next to it for support). Minor contusions likely will need no other treatment.  Home  care  · Elevate the hand to reduce pain and swelling. As much as possible, sit or lie down with the hand raised about the level of your heart. This is especially important during the first 48 hours.  · Ice the finger to help reduce pain and swelling. Wrap a cold source (ice pack or ice cubes in a plastic bag) in a thin towel. Apply to the bruised finger for 20 minutes every 1 to 2 hours the first day. Continue this 3 to 4 times a day until the pain and swelling goes away.  · If batool tape was applied and it becomes wet or dirty, change it. You may replace it with paper, plastic, or cloth tape. Before taping, put a thin strip of cotton or gauze between the fingers to absorb sweat.  · Unless another medication was prescribed, you can take acetaminophen, ibuprofen, or naproxen to control pain. (If you have chronic liver or kidney disease or ever had a stomach ulcer or GI bleeding, talk with your doctor before using these medicines.)  Follow up  Follow up with your healthcare provider or our staff as advised. Call if you are not improving within 1 to 2 weeks.  When to seek medical advice   Call your healthcare provider right away if you have any of the following:  · Increased pain or swelling  · Hand or arm becomes cold, blue, numb or tingly  · Signs of infection: Warmth, drainage, or increased redness or pain around the bruise  · Inability to move the injured finger or hand   · Frequent bruising for unknown reasons  Date Last Reviewed: 4/29/2015  © 0055-6956 [employer]. [address] [place] [address], [place] [address]. All rights reserved. This information is not intended as a substitute for professional medical care. Always follow your healthcare professional's instructions.

## 2019-03-24 NOTE — PATIENT INSTRUCTIONS
Splint as needed for protection.  Rest.  Fluids.  Elevation.  Ice.  Tylenol take as directed as needed for pain.  Keep follow up with your PCP on Wednesday.  Follow up with ortho as needed.  Return here or go to the ER for any worsening symptoms.    Finger Contusion  You have a contusion. This is also called a bruise. There is swelling and some bleeding under the skin, but no broken bones. This injury generally takes a few days to a few weeks to heal. During that time, the bruise will typically change in color from reddish, to purple-blue, to greenish-yellow, then to yellow-brown.  A finger contusion may be treated with a splint or buddy tape (taping the injured finger to the one next to it for support). Minor contusions likely will need no other treatment.  Home care  · Elevate the hand to reduce pain and swelling. As much as possible, sit or lie down with the hand raised about the level of your heart. This is especially important during the first 48 hours.  · Ice the finger to help reduce pain and swelling. Wrap a cold source (ice pack or ice cubes in a plastic bag) in a thin towel. Apply to the bruised finger for 20 minutes every 1 to 2 hours the first day. Continue this 3 to 4 times a day until the pain and swelling goes away.  · If buddy tape was applied and it becomes wet or dirty, change it. You may replace it with paper, plastic, or cloth tape. Before taping, put a thin strip of cotton or gauze between the fingers to absorb sweat.  · Unless another medication was prescribed, you can take acetaminophen, ibuprofen, or naproxen to control pain. (If you have chronic liver or kidney disease or ever had a stomach ulcer or GI bleeding, talk with your doctor before using these medicines.)  Follow up  Follow up with your healthcare provider or our staff as advised. Call if you are not improving within 1 to 2 weeks.  When to seek medical advice   Call your healthcare provider right away if you have any of the  following:  · Increased pain or swelling  · Hand or arm becomes cold, blue, numb or tingly  · Signs of infection: Warmth, drainage, or increased redness or pain around the bruise  · Inability to move the injured finger or hand   · Frequent bruising for unknown reasons  Date Last Reviewed: 4/29/2015  © 0352-1875 [employer]. [address] [place], [place] [id number]. All rights reserved. This information is not intended as a substitute for professional medical care. Always follow your healthcare professional's instructions.

## 2019-03-27 ENCOUNTER — IMMUNIZATION (OUTPATIENT)
Dept: PHARMACY | Facility: CLINIC | Age: 79
End: 2019-03-27
Payer: MEDICARE

## 2019-03-27 ENCOUNTER — OFFICE VISIT (OUTPATIENT)
Dept: INTERNAL MEDICINE | Facility: CLINIC | Age: 79
End: 2019-03-27
Payer: MEDICARE

## 2019-03-27 VITALS
DIASTOLIC BLOOD PRESSURE: 60 MMHG | WEIGHT: 132.31 LBS | HEIGHT: 67 IN | BODY MASS INDEX: 20.76 KG/M2 | HEART RATE: 85 BPM | SYSTOLIC BLOOD PRESSURE: 124 MMHG

## 2019-03-27 DIAGNOSIS — J30.2 SEASONAL ALLERGIC RHINITIS, UNSPECIFIED TRIGGER: ICD-10-CM

## 2019-03-27 DIAGNOSIS — M85.80 OSTEOPENIA, UNSPECIFIED LOCATION: ICD-10-CM

## 2019-03-27 DIAGNOSIS — Z12.31 ENCOUNTER FOR SCREENING MAMMOGRAM FOR BREAST CANCER: ICD-10-CM

## 2019-03-27 DIAGNOSIS — E55.9 VITAMIN D DEFICIENCY: ICD-10-CM

## 2019-03-27 DIAGNOSIS — Z86.73 OLD LACUNAR STROKE WITHOUT LATE EFFECT: ICD-10-CM

## 2019-03-27 DIAGNOSIS — I11.9 HYPERTENSIVE HEART DISEASE WITHOUT HEART FAILURE: Primary | ICD-10-CM

## 2019-03-27 DIAGNOSIS — I65.23 BILATERAL CAROTID ARTERY STENOSIS: ICD-10-CM

## 2019-03-27 DIAGNOSIS — Z23 NEED FOR PNEUMOCOCCAL VACCINE: ICD-10-CM

## 2019-03-27 PROCEDURE — 3074F PR MOST RECENT SYSTOLIC BLOOD PRESSURE < 130 MM HG: ICD-10-PCS | Mod: CPTII,S$GLB,, | Performed by: INTERNAL MEDICINE

## 2019-03-27 PROCEDURE — 99214 PR OFFICE/OUTPT VISIT, EST, LEVL IV, 30-39 MIN: ICD-10-PCS | Mod: S$GLB,,, | Performed by: INTERNAL MEDICINE

## 2019-03-27 PROCEDURE — 1101F PT FALLS ASSESS-DOCD LE1/YR: CPT | Mod: CPTII,S$GLB,, | Performed by: INTERNAL MEDICINE

## 2019-03-27 PROCEDURE — 99999 PR PBB SHADOW E&M-EST. PATIENT-LVL III: ICD-10-PCS | Mod: PBBFAC,,, | Performed by: INTERNAL MEDICINE

## 2019-03-27 PROCEDURE — 99214 OFFICE O/P EST MOD 30 MIN: CPT | Mod: S$GLB,,, | Performed by: INTERNAL MEDICINE

## 2019-03-27 PROCEDURE — 99999 PR PBB SHADOW E&M-EST. PATIENT-LVL III: CPT | Mod: PBBFAC,,, | Performed by: INTERNAL MEDICINE

## 2019-03-27 PROCEDURE — 3074F SYST BP LT 130 MM HG: CPT | Mod: CPTII,S$GLB,, | Performed by: INTERNAL MEDICINE

## 2019-03-27 PROCEDURE — 3078F PR MOST RECENT DIASTOLIC BLOOD PRESSURE < 80 MM HG: ICD-10-PCS | Mod: CPTII,S$GLB,, | Performed by: INTERNAL MEDICINE

## 2019-03-27 PROCEDURE — 1101F PR PT FALLS ASSESS DOC 0-1 FALLS W/OUT INJ PAST YR: ICD-10-PCS | Mod: CPTII,S$GLB,, | Performed by: INTERNAL MEDICINE

## 2019-03-27 PROCEDURE — 3078F DIAST BP <80 MM HG: CPT | Mod: CPTII,S$GLB,, | Performed by: INTERNAL MEDICINE

## 2019-03-27 RX ORDER — AMLODIPINE BESYLATE 5 MG/1
5 TABLET ORAL DAILY
Qty: 90 TABLET | Refills: 3 | Status: SHIPPED | OUTPATIENT
Start: 2019-03-27 | End: 2020-02-28

## 2019-03-27 RX ORDER — CLOPIDOGREL BISULFATE 75 MG/1
75 TABLET ORAL DAILY
Qty: 90 TABLET | Refills: 3 | Status: SHIPPED | OUTPATIENT
Start: 2019-03-27 | End: 2020-04-14

## 2019-03-27 RX ORDER — LEVOCETIRIZINE DIHYDROCHLORIDE 5 MG/1
5 TABLET, FILM COATED ORAL DAILY PRN
Qty: 90 TABLET | Refills: 0 | Status: SHIPPED | OUTPATIENT
Start: 2019-03-27

## 2019-03-28 NOTE — PROGRESS NOTES
Subjective:       Patient ID: Jessenia Da Silva is a 79 y.o. female.    Chief Complaint: Hypertension    Last seen for a general check up 10 months ago. Returns for annual exam and f/u chronic medical conditions. Multiple urgent visits in recent months for upper respiratory events considered either viral or allergic in nature. Most recent urgent visit three days ago for contusion of right index finger, x-rays negative. Pain is relieved with Tylenol. Compliant with daily meds as prescribed.     PMH:   Hypertension with concentric remodeling on echo , normal LV function.   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   Cervical spondylosis.  Mild Bilateral Carotid Artery Disease on ultrasound 3/18.  Osteopenia.     PSH: Tonsillectomy. Hysterectomy and BSO. Lasik right eye. Bilateral Cataract extraction.    Mammogram normal . BMD 3/18. EKG normal 3/18. Colonoscopy normal >10 yrs ago. Eye exam  Dr. Saucedo. Flu shot 10/17. Labs : CBC normal, CMP normal except CO2 31, TBili 1.2, Vit D 4, TChol 181, TG 83, HDL 89, LDL 75, Urinalysis with leukocytes but no bacteria, glucose or protein.    Social: Social tobacco use up to 3 cigarettes on occasion. Occasional alcohol. , adult son lives locally - he is a heart and kidney transplant recipient. Retired consumer credit counselor.     FMH: HTN in father, dementia in mother, pancreatic cancer in sister.     NKDA.     Medications: Amlodipine 5mg daily, Pravastatin 10mg daily, Plavix 75mg daily, Flonase prn, Vitamin D 50,000 weekly.     Review of Systems   Constitutional: Negative for activity change, appetite change, fatigue, fever and unexpected weight change.   HENT: Positive for postnasal drip. Negative for congestion, hearing loss, rhinorrhea, sneezing, sore throat, trouble swallowing and voice change.    Eyes: Negative for pain and visual disturbance.   Respiratory: Negative for  "cough, chest tightness, shortness of breath and wheezing.    Cardiovascular: Negative for chest pain, palpitations and leg swelling.   Gastrointestinal: Negative for abdominal pain, blood in stool, constipation, diarrhea, nausea and vomiting.   Genitourinary: Negative for difficulty urinating, dysuria, flank pain, frequency, hematuria and urgency.   Musculoskeletal: Positive for arthralgias. Negative for back pain, joint swelling, myalgias and neck pain.   Skin: Negative for color change and rash.   Neurological: Negative for dizziness, syncope, facial asymmetry, speech difficulty, weakness, numbness and headaches.   Hematological: Negative for adenopathy. Does not bruise/bleed easily.   Psychiatric/Behavioral: Negative for agitation, dysphoric mood and sleep disturbance. The patient is not nervous/anxious.        Objective:    /60, Pulse 85, Ht 5' 7", Wt 132 lbs (unchanged), BMI=20.7  Physical Exam   Constitutional: She is oriented to person, place, and time. She appears well-developed and well-nourished. No distress.   Well groomed, ambulatory with normal gait, here alone.    HENT:   Head: Normocephalic and atraumatic.   Right Ear: External ear normal.   Left Ear: External ear normal.   Nose: Nose normal.   Mouth/Throat: Oropharynx is clear and moist. No oropharyngeal exudate.   Eyes: Pupils are equal, round, and reactive to light. Conjunctivae and EOM are normal. Right conjunctiva is not injected. Left conjunctiva is not injected. No scleral icterus.   Neck: Normal range of motion. Neck supple. No JVD present. Carotid bruit is not present. No thyromegaly present.   Cardiovascular: Normal rate, regular rhythm, normal heart sounds and intact distal pulses. Exam reveals no gallop and no friction rub.   No murmur heard.  Pulmonary/Chest: Effort normal and breath sounds normal. No respiratory distress. She has no wheezes. She has no rhonchi. She has no rales.   Abdominal: Soft. Bowel sounds are normal. She " exhibits no distension and no mass. There is no hepatosplenomegaly. There is no tenderness. There is no CVA tenderness.   Musculoskeletal: Normal range of motion. She exhibits no edema, tenderness or deformity.   Lymphadenopathy:     She has no cervical adenopathy.   Neurological: She is alert and oriented to person, place, and time. She has normal strength and normal reflexes. No cranial nerve deficit. She exhibits normal muscle tone. Coordination and gait normal.   Skin: Skin is warm and dry. No lesion and no rash noted. She is not diaphoretic. No cyanosis or erythema. No pallor. Nails show no clubbing.   Psychiatric: She has a normal mood and affect. Her behavior is normal. Judgment and thought content normal.   Vitals reviewed.      Assessment:       1. Hypertensive heart disease without heart failure    2. Bilateral carotid artery stenosis    3. Old lacunar stroke without late effect    4. Osteopenia, unspecified location    5. Vitamin D deficiency    6. Seasonal allergic rhinitis, unspecified trigger    7. Encounter for screening mammogram for breast cancer    8. Need for pneumococcal vaccine        Plan:       Hypertensive heart disease without heart failure - controlled, continue present care.   -     CBC auto differential; Future; Expected date: 03/27/2019  -     Comprehensive metabolic panel; Future; Expected date: 03/27/2019  -     Urinalysis  -     amLODIPine (NORVASC) 5 MG tablet; Take 1 tablet (5 mg total) by mouth once daily.  Dispense: 90 tablet; Refill: 3    Bilateral carotid artery stenosis  -     Lipid panel; Future; Expected date: 03/27/2019  -     Continue Pravastatin.     Old lacunar stroke without late effect  -     clopidogrel (PLAVIX) 75 mg tablet; Take 1 tablet (75 mg total) by mouth once daily.  Dispense: 90 tablet; Refill: 3    Osteopenia, unspecified location - regular exercise, Calcium 500-600mg daily    Vitamin D deficiency  -     Vitamin D; Future; Expected date:  03/27/2019    Seasonal allergic rhinitis, unspecified trigger  -     levocetirizine (XYZAL) 5 MG tablet; Take 1 tablet (5 mg total) by mouth daily as needed for Allergies.  Dispense: 90 tablet; Refill: 0    Encounter for screening mammogram for breast cancer  -     Mammo Digital Screening Bilat; Future; Expected date: 03/27/2019    Need for pneumococcal vaccine - Prevnar 13 today.

## 2019-04-03 ENCOUNTER — TELEPHONE (OUTPATIENT)
Dept: INTERNAL MEDICINE | Facility: CLINIC | Age: 79
End: 2019-04-03

## 2019-04-03 ENCOUNTER — LAB VISIT (OUTPATIENT)
Dept: LAB | Facility: HOSPITAL | Age: 79
End: 2019-04-03
Attending: INTERNAL MEDICINE
Payer: MEDICARE

## 2019-04-03 DIAGNOSIS — I11.9 HYPERTENSIVE HEART DISEASE WITHOUT HEART FAILURE: ICD-10-CM

## 2019-04-03 DIAGNOSIS — E55.9 VITAMIN D DEFICIENCY: ICD-10-CM

## 2019-04-03 DIAGNOSIS — I11.9 MALIGNANT HYPERTENSIVE HEART DISEASE WITHOUT HEART FAILURE: Primary | ICD-10-CM

## 2019-04-03 DIAGNOSIS — I65.23 BILATERAL CAROTID ARTERY STENOSIS: ICD-10-CM

## 2019-04-03 LAB
25(OH)D3+25(OH)D2 SERPL-MCNC: 29 NG/ML (ref 30–96)
ALBUMIN SERPL BCP-MCNC: 3.5 G/DL (ref 3.5–5.2)
ALP SERPL-CCNC: 73 U/L (ref 55–135)
ALT SERPL W/O P-5'-P-CCNC: 7 U/L (ref 10–44)
ANION GAP SERPL CALC-SCNC: 8 MMOL/L (ref 8–16)
ANISOCYTOSIS BLD QL SMEAR: SLIGHT
AST SERPL-CCNC: 15 U/L (ref 10–40)
BASOPHILS # BLD AUTO: 0.08 K/UL (ref 0–0.2)
BASOPHILS NFR BLD: 1.4 % (ref 0–1.9)
BILIRUB SERPL-MCNC: 0.7 MG/DL (ref 0.1–1)
BUN SERPL-MCNC: 9 MG/DL (ref 8–23)
CALCIUM SERPL-MCNC: 10 MG/DL (ref 8.7–10.5)
CHLORIDE SERPL-SCNC: 99 MMOL/L (ref 95–110)
CHOLEST SERPL-MCNC: 161 MG/DL (ref 120–199)
CHOLEST/HDLC SERPL: 2.1 {RATIO} (ref 2–5)
CO2 SERPL-SCNC: 33 MMOL/L (ref 23–29)
CREAT SERPL-MCNC: 0.7 MG/DL (ref 0.5–1.4)
DACRYOCYTES BLD QL SMEAR: ABNORMAL
DIFFERENTIAL METHOD: ABNORMAL
EOSINOPHIL # BLD AUTO: 0.2 K/UL (ref 0–0.5)
EOSINOPHIL NFR BLD: 2.6 % (ref 0–8)
ERYTHROCYTE [DISTWIDTH] IN BLOOD BY AUTOMATED COUNT: 14 % (ref 11.5–14.5)
EST. GFR  (AFRICAN AMERICAN): >60 ML/MIN/1.73 M^2
EST. GFR  (NON AFRICAN AMERICAN): >60 ML/MIN/1.73 M^2
GLUCOSE SERPL-MCNC: 85 MG/DL (ref 70–110)
HCT VFR BLD AUTO: 36.9 % (ref 37–48.5)
HDLC SERPL-MCNC: 76 MG/DL (ref 40–75)
HDLC SERPL: 47.2 % (ref 20–50)
HGB BLD-MCNC: 12.2 G/DL (ref 12–16)
LDLC SERPL CALC-MCNC: 73.8 MG/DL (ref 63–159)
LYMPHOCYTES # BLD AUTO: 2.1 K/UL (ref 1–4.8)
LYMPHOCYTES NFR BLD: 37.4 % (ref 18–48)
MCH RBC QN AUTO: 30.6 PG (ref 27–31)
MCHC RBC AUTO-ENTMCNC: 33.1 G/DL (ref 32–36)
MCV RBC AUTO: 93 FL (ref 82–98)
MONOCYTES # BLD AUTO: 0.6 K/UL (ref 0.3–1)
MONOCYTES NFR BLD: 9.8 % (ref 4–15)
NEUTROPHILS # BLD AUTO: 2.8 K/UL (ref 1.8–7.7)
NEUTROPHILS NFR BLD: 48.8 % (ref 38–73)
NONHDLC SERPL-MCNC: 85 MG/DL
PLATELET # BLD AUTO: 281 K/UL (ref 150–350)
PLATELET BLD QL SMEAR: ABNORMAL
PMV BLD AUTO: 8.8 FL (ref 9.2–12.9)
POIKILOCYTOSIS BLD QL SMEAR: SLIGHT
POTASSIUM SERPL-SCNC: 4.1 MMOL/L (ref 3.5–5.1)
PROT SERPL-MCNC: 7.1 G/DL (ref 6–8.4)
RBC # BLD AUTO: 3.99 M/UL (ref 4–5.4)
SODIUM SERPL-SCNC: 140 MMOL/L (ref 136–145)
TRIGL SERPL-MCNC: 56 MG/DL (ref 30–150)
WBC # BLD AUTO: 5.72 K/UL (ref 3.9–12.7)

## 2019-04-03 PROCEDURE — 36415 COLL VENOUS BLD VENIPUNCTURE: CPT

## 2019-04-03 PROCEDURE — 85025 COMPLETE CBC W/AUTO DIFF WBC: CPT

## 2019-04-03 PROCEDURE — 82306 VITAMIN D 25 HYDROXY: CPT

## 2019-04-03 PROCEDURE — 80061 LIPID PANEL: CPT

## 2019-04-03 PROCEDURE — 80053 COMPREHEN METABOLIC PANEL: CPT

## 2019-04-07 DIAGNOSIS — J30.2 SEASONAL ALLERGIC RHINITIS, UNSPECIFIED TRIGGER: ICD-10-CM

## 2019-04-07 RX ORDER — LEVOCETIRIZINE DIHYDROCHLORIDE 5 MG/1
5 TABLET, FILM COATED ORAL DAILY PRN
Qty: 90 TABLET | Refills: 0 | OUTPATIENT
Start: 2019-04-07

## 2019-04-11 DIAGNOSIS — J06.9 VIRAL URI WITH COUGH: ICD-10-CM

## 2019-04-11 RX ORDER — FLUTICASONE PROPIONATE 50 MCG
2 SPRAY, SUSPENSION (ML) NASAL DAILY
Qty: 1 BOTTLE | Refills: 5 | Status: SHIPPED | OUTPATIENT
Start: 2019-04-11 | End: 2020-08-11 | Stop reason: SDUPTHER

## 2019-04-11 NOTE — TELEPHONE ENCOUNTER
----- Message from Sara Hagen sent at 4/11/2019  9:57 AM CDT -----  Contact: [phone]  Patient is calling for an RX refill or new RX.  Is this a refill or new RX:  Refill   RX name and strength: fluticasone (FLONASE) 50 mcg/actuation nasal spray    Local pharmacy or mail order pharmacy:  [facility]/pharm[facility] #[id number] - [place], [place] - [address]   [phone] (Phone)  [phone] (Fax)         Comments:  Please advise, thanks

## 2019-04-17 DIAGNOSIS — E55.9 VITAMIN D DEFICIENCY: ICD-10-CM

## 2019-04-17 RX ORDER — ERGOCALCIFEROL 1.25 MG/1
CAPSULE ORAL
Qty: 12 CAPSULE | Refills: 1 | Status: SHIPPED | OUTPATIENT
Start: 2019-04-17 | End: 2019-10-03 | Stop reason: SDUPTHER

## 2019-05-22 ENCOUNTER — HOSPITAL ENCOUNTER (OUTPATIENT)
Dept: RADIOLOGY | Facility: HOSPITAL | Age: 79
Discharge: HOME | End: 2019-05-22
Attending: INTERNAL MEDICINE
Payer: MEDICARE

## 2019-05-22 DIAGNOSIS — Z12.31 ENCOUNTER FOR SCREENING MAMMOGRAM FOR BREAST CANCER: ICD-10-CM

## 2019-05-22 PROCEDURE — 77067 MAMMO DIGITAL SCREENING BILAT WITH TOMOSYNTHESIS_CAD: ICD-10-PCS | Mod: 26,,, | Performed by: RADIOLOGY

## 2019-05-22 PROCEDURE — 77067 SCR MAMMO BI INCL CAD: CPT | Mod: TC

## 2019-05-22 PROCEDURE — 77067 SCR MAMMO BI INCL CAD: CPT | Mod: 26,,, | Performed by: RADIOLOGY

## 2019-05-22 PROCEDURE — 77063 MAMMO DIGITAL SCREENING BILAT WITH TOMOSYNTHESIS_CAD: ICD-10-PCS | Mod: 26,,, | Performed by: RADIOLOGY

## 2019-05-22 PROCEDURE — 77063 BREAST TOMOSYNTHESIS BI: CPT | Mod: 26,,, | Performed by: RADIOLOGY

## 2019-07-08 DIAGNOSIS — I77.9 BILATERAL CAROTID ARTERY DISEASE: ICD-10-CM

## 2019-07-08 RX ORDER — PRAVASTATIN SODIUM 10 MG/1
TABLET ORAL
Qty: 90 TABLET | Refills: 2 | Status: SHIPPED | OUTPATIENT
Start: 2019-07-08 | End: 2020-04-02

## 2019-07-25 ENCOUNTER — TELEPHONE (OUTPATIENT)
Dept: PRIMARY CARE CLINIC | Facility: CLINIC | Age: 79
End: 2019-07-25

## 2019-07-25 NOTE — TELEPHONE ENCOUNTER
alicja is advised that pt should hold plavix before dental procedure tomorrow  and  with pt only skipping one dose she  may bleed a little more than normal. Alicja will inform the doctor and pt

## 2019-07-25 NOTE — TELEPHONE ENCOUNTER
----- Message from Bonnie Pearson sent at 7/25/2019 10:09 AM CDT -----  Contact: Zan Ortiz [phone]  Patient is having work done tomorrow and they need to know if they can get clearance since patient is taking Plavix.  They are faxing a clearance  form over .  Should she reschedule?

## 2019-09-07 ENCOUNTER — OFFICE VISIT (OUTPATIENT)
Dept: URGENT CARE | Facility: CLINIC | Age: 79
End: 2019-09-07
Payer: MEDICARE

## 2019-09-07 VITALS
WEIGHT: 132 LBS | TEMPERATURE: 98 F | DIASTOLIC BLOOD PRESSURE: 79 MMHG | HEART RATE: 72 BPM | HEIGHT: 67 IN | BODY MASS INDEX: 20.72 KG/M2 | SYSTOLIC BLOOD PRESSURE: 133 MMHG | OXYGEN SATURATION: 96 %
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
## 2019-09-07 DIAGNOSIS — M25.649 STIFFNESS OF HAND JOINT, UNSPECIFIED LATERALITY: ICD-10-CM

## 2019-09-07 PROCEDURE — 1101F PR PT FALLS ASSESS DOC 0-1 FALLS W/OUT INJ PAST YR: ICD-10-PCS | Mod: CPTII,S$GLB,, | Performed by: NURSE PRACTITIONER

## 2019-09-07 PROCEDURE — 3075F SYST BP GE 130 - 139MM HG: CPT | Mod: CPTII,S$GLB,, | Performed by: NURSE PRACTITIONER

## 2019-09-07 PROCEDURE — 3078F PR MOST RECENT DIASTOLIC BLOOD PRESSURE < 80 MM HG: ICD-10-PCS | Mod: CPTII,S$GLB,, | Performed by: NURSE PRACTITIONER

## 2019-09-07 PROCEDURE — 99213 PR OFFICE/OUTPT VISIT, EST, LEVL III, 20-29 MIN: ICD-10-PCS | Mod: S$GLB,,, | Performed by: NURSE PRACTITIONER

## 2019-09-07 PROCEDURE — 1101F PT FALLS ASSESS-DOCD LE1/YR: CPT | Mod: CPTII,S$GLB,, | Performed by: NURSE PRACTITIONER

## 2019-09-07 PROCEDURE — 3078F DIAST BP <80 MM HG: CPT | Mod: CPTII,S$GLB,, | Performed by: NURSE PRACTITIONER

## 2019-09-07 PROCEDURE — 3075F PR MOST RECENT SYSTOLIC BLOOD PRESS GE 130-139MM HG: ICD-10-PCS | Mod: CPTII,S$GLB,, | Performed by: NURSE PRACTITIONER

## 2019-09-07 PROCEDURE — 99213 OFFICE O/P EST LOW 20 MIN: CPT | Mod: S$GLB,,, | Performed by: NURSE PRACTITIONER

## 2019-09-07 NOTE — PATIENT INSTRUCTIONS
Continue to use cream for bites  Follow up with PCP as scheduled in October  Go to ER for Chest pain, shortness of breath, and other symptoms we discussed.   Insect Bite  Insects most often bite to protect themselves or their nests. Certain bugs, like fleas and mosquitoes, bite to feed. In some cases, the actual bite causes no pain. An itchy red welt or swelling may develop at the site of the bite. Most insect bites do not cause illness. And the itching and swelling most often go away without treatment. However, an infection can develop if the bite is scratched and the skin broken. Rarely, a person may have an allergic reaction to an insect bite.  If a stinger is visible at the bite spot, remove it as quickly as possible, as this can decrease the amount of venom that gets into your body. Scrape it out with a dull edge, such as the edge of a credit card. Try not to squeeze it. Do not try to dig it out, as you may damage the skin and also increase the chance of infection.     To help reduce swelling and itching, apply a cold pack or ice in a zip-top plastic bag wrapped in a thin towel.   Home care  · Your healthcare provider may prescribe over-the-counter medicines to help relieve itching and swelling. Use each medicine according to the directions on the package. If the bite becomes infected, you will need an antibiotic. This may be in pill form taken by mouth or as an ointment or cream put directly on the skin. Be sure to use them exactly as prescribed.  · Bite symptoms usually go away on their own within a week or two.  · To help prevent infection, avoid scratching or picking at the bite.  · To help relieve itching and swelling, apply ice in a zip-top plastic bag wrapped in a thin towel to the bites. Do this for up to 10 minutes at a time. Avoid hot showers or baths as these tend to make itching worse.  · An over-the-counter anti-itch medicine such as calamine lotion or an antihistamine cream may be helpful.  · If  you suspect you have insects in your home, talk to a licensed pest-control professional. He or she can inspect your home and tell you how to get rid of bugs safely.  Follow-up care  Follow up with your healthcare provider, or as advised.  Call 911  Call 911 if any of these occur:  · Trouble breathing or swallowing  · Wheezing  · Feeling like your throat is closing up  · Fainting, loss of consciousness  · Swelling around the face or mouth  When to seek medical advice  Call your healthcare provider right away if any of these occur:  · Fever of 100.4°F (38°C) or higher, or as directed by your healthcare provider  · Signs of infection, such as increased swelling and pain, warmth, red streaks, or drainage from the skin  · Signs of allergic reaction, such as hives, a spreading rash, or throat itching  Date Last Reviewed: 10/1/2016  © 8976-2008 [employer]. [address] [place] [address], [place] [address]. All rights reserved. This information is not intended as a substitute for professional medical care. Always follow your healthcare professional's instructions.

## 2019-09-07 NOTE — PROGRESS NOTES
"Subjective:       Patient ID: Jessenia Da Silva is a 79 y.o. female.    Vitals:  height is 5' 7" (1.702 m) and weight is 59.9 kg (132 lb). Her tympanic temperature is 97.8 °F (36.6 °C). Her blood pressure is 133/79 and her pulse is 72. Her oxygen saturation is 96%.     Chief Complaint: Insect Bite (right arm, fingers)    Used a prior rx cream which stopped the itching the bite itself has not gone away.     Insect Bite   This is a new problem. The current episode started in the past 7 days. The problem occurs daily. The problem has been unchanged. Associated symptoms include a rash. Pertinent negatives include no arthralgias, chills, coughing, fever, joint swelling or sore throat.       Constitution: Negative for chills and fever.   HENT: Negative for facial swelling and sore throat.    Neck: Negative for painful lymph nodes.   Eyes: Negative for eye itching and eyelid swelling.   Respiratory: Negative for cough.    Musculoskeletal: Negative for joint pain and joint swelling.   Skin: Positive for rash. Negative for color change, pale, wound, abrasion, laceration, lesion, skin thickening/induration, puncture wound, erythema, bruising, abscess, avulsion and hives.   Allergic/Immunologic: Positive for environmental allergies and itching. Negative for immunocompromised state and hives.   Hematologic/Lymphatic: Negative for swollen lymph nodes.       Objective:      Physical Exam   Constitutional: She is oriented to person, place, and time. Vital signs are normal. She appears well-developed and well-nourished. She is cooperative.   HENT:   Head: Normocephalic.   Right Ear: External ear normal.   Left Ear: External ear normal.   Nose: Nose normal.   Mouth/Throat: Oropharynx is clear and moist.   Cardiovascular: Normal rate, regular rhythm, normal heart sounds and normal pulses.   Pulmonary/Chest: Effort normal and breath sounds normal. No accessory muscle usage or stridor. No apnea, no tachypnea and no bradypnea. No " respiratory distress. She has no decreased breath sounds. She has no wheezes. She has no rhonchi. She has no rales.   Musculoskeletal:        Right wrist: Normal. She exhibits normal range of motion.        Left wrist: Normal.        Right hand: Normal. She exhibits normal range of motion, no tenderness, no bony tenderness, normal capillary refill and no swelling. Normal sensation noted. Normal strength noted.        Left hand: Normal. She exhibits normal range of motion, no tenderness, no bony tenderness, normal capillary refill and no swelling. Normal sensation noted. Normal strength noted.   Neurological: She is alert and oriented to person, place, and time.   Skin: Skin is warm, dry and intact. Capillary refill takes less than 2 seconds. No erythema.        Nursing note and vitals reviewed.      Assessment:       1. Insect bite, initial encounter    2. Stiffness of hand joint, unspecified laterality        Plan:         Insect bite, initial encounter    Stiffness of hand joint, unspecified laterality         Patient Instructions       Continue to use cream for bites  Follow up with PCP as scheduled in October  Go to ER for Chest pain, shortness of breath, and other symptoms we discussed.   Insect Bite  Insects most often bite to protect themselves or their nests. Certain bugs, like fleas and mosquitoes, bite to feed. In some cases, the actual bite causes no pain. An itchy red welt or swelling may develop at the site of the bite. Most insect bites do not cause illness. And the itching and swelling most often go away without treatment. However, an infection can develop if the bite is scratched and the skin broken. Rarely, a person may have an allergic reaction to an insect bite.  If a stinger is visible at the bite spot, remove it as quickly as possible, as this can decrease the amount of venom that gets into your body. Scrape it out with a dull edge, such as the edge of a credit card. Try not to squeeze it. Do  not try to dig it out, as you may damage the skin and also increase the chance of infection.     To help reduce swelling and itching, apply a cold pack or ice in a zip-top plastic bag wrapped in a thin towel.   Home care  · Your healthcare provider may prescribe over-the-counter medicines to help relieve itching and swelling. Use each medicine according to the directions on the package. If the bite becomes infected, you will need an antibiotic. This may be in pill form taken by mouth or as an ointment or cream put directly on the skin. Be sure to use them exactly as prescribed.  · Bite symptoms usually go away on their own within a week or two.  · To help prevent infection, avoid scratching or picking at the bite.  · To help relieve itching and swelling, apply ice in a zip-top plastic bag wrapped in a thin towel to the bites. Do this for up to 10 minutes at a time. Avoid hot showers or baths as these tend to make itching worse.  · An over-the-counter anti-itch medicine such as calamine lotion or an antihistamine cream may be helpful.  · If you suspect you have insects in your home, talk to a licensed pest-control professional. He or she can inspect your home and tell you how to get rid of bugs safely.  Follow-up care  Follow up with your healthcare provider, or as advised.  Call 911  Call 911 if any of these occur:  · Trouble breathing or swallowing  · Wheezing  · Feeling like your throat is closing up  · Fainting, loss of consciousness  · Swelling around the face or mouth  When to seek medical advice  Call your healthcare provider right away if any of these occur:  · Fever of 100.4°F (38°C) or higher, or as directed by your healthcare provider  · Signs of infection, such as increased swelling and pain, warmth, red streaks, or drainage from the skin  · Signs of allergic reaction, such as hives, a spreading rash, or throat itching  Date Last Reviewed: 10/1/2016  © 6930-4351 [employer]. [address] [place] [address]  [place] [address]. All rights reserved. This information is not intended as a substitute for professional medical care. Always follow your healthcare professional's instructions.

## 2019-09-18 ENCOUNTER — PATIENT OUTREACH (OUTPATIENT)
Dept: ADMINISTRATIVE | Facility: HOSPITAL | Age: 79
End: 2019-09-18

## 2019-10-01 ENCOUNTER — OFFICE VISIT (OUTPATIENT)
Dept: PRIMARY CARE CLINIC | Facility: CLINIC | Age: 79
End: 2019-10-01
Payer: MEDICARE

## 2019-10-01 ENCOUNTER — IMMUNIZATION (OUTPATIENT)
Dept: PHARMACY | Facility: CLINIC | Age: 79
End: 2019-10-01
Payer: MEDICARE

## 2019-10-01 VITALS
HEIGHT: 67 IN | DIASTOLIC BLOOD PRESSURE: 60 MMHG | HEART RATE: 78 BPM | BODY MASS INDEX: 20.76 KG/M2 | WEIGHT: 132.31 LBS | SYSTOLIC BLOOD PRESSURE: 106 MMHG

## 2019-10-01 DIAGNOSIS — Z23 NEED FOR DIPHTHERIA-TETANUS-PERTUSSIS (TDAP) VACCINE: ICD-10-CM

## 2019-10-01 DIAGNOSIS — I11.9 HYPERTENSIVE HEART DISEASE WITHOUT HEART FAILURE: Primary | ICD-10-CM

## 2019-10-01 PROCEDURE — 1101F PR PT FALLS ASSESS DOC 0-1 FALLS W/OUT INJ PAST YR: ICD-10-PCS | Mod: CPTII,S$GLB,, | Performed by: INTERNAL MEDICINE

## 2019-10-01 PROCEDURE — 3074F PR MOST RECENT SYSTOLIC BLOOD PRESSURE < 130 MM HG: ICD-10-PCS | Mod: CPTII,S$GLB,, | Performed by: INTERNAL MEDICINE

## 2019-10-01 PROCEDURE — 99213 OFFICE O/P EST LOW 20 MIN: CPT | Mod: S$GLB,,, | Performed by: INTERNAL MEDICINE

## 2019-10-01 PROCEDURE — 3074F SYST BP LT 130 MM HG: CPT | Mod: CPTII,S$GLB,, | Performed by: INTERNAL MEDICINE

## 2019-10-01 PROCEDURE — 3078F DIAST BP <80 MM HG: CPT | Mod: CPTII,S$GLB,, | Performed by: INTERNAL MEDICINE

## 2019-10-01 PROCEDURE — 99999 PR PBB SHADOW E&M-EST. PATIENT-LVL III: ICD-10-PCS | Mod: PBBFAC,,, | Performed by: INTERNAL MEDICINE

## 2019-10-01 PROCEDURE — 3078F PR MOST RECENT DIASTOLIC BLOOD PRESSURE < 80 MM HG: ICD-10-PCS | Mod: CPTII,S$GLB,, | Performed by: INTERNAL MEDICINE

## 2019-10-01 PROCEDURE — 1101F PT FALLS ASSESS-DOCD LE1/YR: CPT | Mod: CPTII,S$GLB,, | Performed by: INTERNAL MEDICINE

## 2019-10-01 PROCEDURE — 99999 PR PBB SHADOW E&M-EST. PATIENT-LVL III: CPT | Mod: PBBFAC,,, | Performed by: INTERNAL MEDICINE

## 2019-10-01 PROCEDURE — 99213 PR OFFICE/OUTPT VISIT, EST, LEVL III, 20-29 MIN: ICD-10-PCS | Mod: S$GLB,,, | Performed by: INTERNAL MEDICINE

## 2019-10-01 NOTE — PROGRESS NOTES
Subjective:       Patient ID: Jessenia Da Silva is a 79 y.o. female.    Chief Complaint: Hypertension    Last seen 6 months ago for annual exam, Hypertension was controlled. Returns for follow up. No new complaints, feeling well aside from vision changes after cataract surgery with outcome not as good as she'd hoped. Has consulted a second Ophthalmologist.     PMH:   Hypertension with concentric remodeling on echo , normal LV function.   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   Cervical spondylosis.  Mild Bilateral Carotid Artery Disease on ultrasound 3/18.  Osteopenia.     PSH: Tonsillectomy. Hysterectomy and BSO. Lasik right eye. Bilateral Cataract extraction.    Mammogram normal . BMD 3/18. EKG normal 3/18. Colonoscopy normal >10 yrs ago. Flu shot 19 at [facility]. Labs : CBC normal, CMP normal except CO2 33, Vit D 29, TChol 161, TG 56, HDL 76, LDL 74, Urinalysis clear.    Social: Social tobacco use up to 3 cigarettes on occasion. Occasional alcohol. , adult son lives locally - he is a heart and kidney transplant recipient. Retired consumer credit counselor.     FMH: HTN in father, dementia in mother, pancreatic cancer in sister.     NKDA.     Medications: Amlodipine 5mg daily, Pravastatin 10mg daily, Plavix 75mg daily, Flonase prn, Vitamin D 50,000 weekly.     Review of Systems   Constitutional: Negative for fatigue and unexpected weight change.   Respiratory: Negative for cough and shortness of breath.    Cardiovascular: Negative for chest pain, palpitations and leg swelling.   Gastrointestinal: Negative for abdominal pain, diarrhea, nausea and vomiting.   Genitourinary: Negative for dysuria and frequency.   Musculoskeletal: Negative for arthralgias, gait problem and myalgias.   Skin: Negative for rash and wound.   Neurological: Negative for dizziness, syncope, weakness and headaches.    Psychiatric/Behavioral: Negative for dysphoric mood. The patient is not nervous/anxious.        Objective:    /60, Pulse 78, Wt 132 lbs (stable)  Physical Exam   Constitutional: She is oriented to person, place, and time. She appears well-developed and well-nourished. No distress.   HENT:   Nose: Nose normal.   Mouth/Throat: Oropharynx is clear and moist.   Neck: Normal range of motion. Neck supple. No JVD present.   Cardiovascular: Normal rate, regular rhythm and normal heart sounds.   Pulmonary/Chest: Effort normal and breath sounds normal. No respiratory distress. She has no wheezes. She has no rales.   Musculoskeletal: Normal range of motion. She exhibits no edema.   Neurological: She is alert and oriented to person, place, and time. No cranial nerve deficit. Coordination normal.   Skin: Skin is warm and dry.   Psychiatric: She has a normal mood and affect. Her behavior is normal. Judgment and thought content normal.       Assessment:       1. Hypertensive heart disease without heart failure    2. Need for diphtheria-tetanus-pertussis (Tdap) vaccine        Plan:       Hypertensive heart disease without heart failure - well controlled, continue present care.     Need for diphtheria-tetanus-pertussis (Tdap) vaccine - Tdap today.     Shingrix declined.

## 2019-10-03 DIAGNOSIS — E55.9 VITAMIN D DEFICIENCY: ICD-10-CM

## 2019-10-03 RX ORDER — ERGOCALCIFEROL 1.25 MG/1
CAPSULE ORAL
Qty: 12 CAPSULE | Refills: 1 | Status: SHIPPED | OUTPATIENT
Start: 2019-10-03 | End: 2020-04-06

## 2019-11-14 ENCOUNTER — TELEPHONE (OUTPATIENT)
Dept: PRIMARY CARE CLINIC | Facility: CLINIC | Age: 79
End: 2019-11-14

## 2019-11-14 NOTE — TELEPHONE ENCOUNTER
----- Message from Dori Jay sent at 11/14/2019 10:27 AM CST -----  Patient is requesting a call from the office regarding pain in her legs.    Please advise, thank you.

## 2019-11-14 NOTE — TELEPHONE ENCOUNTER
Pt c/o leg cramps she think it may be her mattress pt state she will change the mattress out and see if that help she will call with a update next week

## 2020-02-28 DIAGNOSIS — I11.9 HYPERTENSIVE HEART DISEASE WITHOUT HEART FAILURE: ICD-10-CM

## 2020-02-28 RX ORDER — AMLODIPINE BESYLATE 5 MG/1
TABLET ORAL
Qty: 90 TABLET | Refills: 0 | Status: SHIPPED | OUTPATIENT
Start: 2020-02-28 | End: 2020-07-05

## 2020-03-02 ENCOUNTER — TELEPHONE (OUTPATIENT)
Dept: PRIMARY CARE CLINIC | Facility: CLINIC | Age: 80
End: 2020-03-02

## 2020-03-02 NOTE — TELEPHONE ENCOUNTER
Pt is requesting a refill on med amlodipine 5 mg I advised  pt  med was refilled by Dr Bullard on 2/28/20   Pt state the pharmacy told her med was due for a refill until April  I spoke with the pharmacist and she informed me that it look like the med was filled at another pharmacy and for the pt to contact her insurance to see where the med Is filled at and to call [facility] pharmacy back and give info.

## 2020-03-02 NOTE — TELEPHONE ENCOUNTER
----- Message from Francisco Iqbal sent at 3/2/2020 12:59 PM CST -----  Contact: Patient [phone]  RX request - refill or new RX.  Is this a refill or new RX: Refill   RX name and strength: amLODIPine (NORVASC) 5 MG tablet    Pharmacy name and phone #[id number] - [place] [address] [phone] (Phone) [phone] (Fax)    Comments:  Patient stating pharmacy informed the Rx not due to refill til April, all out of Rx, on 02/28/2 patient stating pharmacy only gave 3 tablets, call to inform full 30 days will or will not be sent.    Please call an advise  Thank you

## 2020-03-27 ENCOUNTER — TELEPHONE (OUTPATIENT)
Dept: PRIMARY CARE CLINIC | Facility: CLINIC | Age: 80
End: 2020-03-27

## 2020-03-27 NOTE — TELEPHONE ENCOUNTER
----- Message from Jean Marie Valentin sent at 3/27/2020  3:30 PM CDT -----  Contact: self   Patient stated that she is having back pain and wants to know is she can take Rx [place] that she had since 07/2019. Please call and advise

## 2020-03-27 NOTE — TELEPHONE ENCOUNTER
Pt c/o back pain for a few days, she is taking tylenol and she found a old rx bottle of NORCO and she was advised not to take and to cont tylenol for safe method

## 2020-04-02 DIAGNOSIS — I77.9 BILATERAL CAROTID ARTERY DISEASE: ICD-10-CM

## 2020-04-02 RX ORDER — PRAVASTATIN SODIUM 10 MG/1
TABLET ORAL
Qty: 90 TABLET | Refills: 0 | Status: SHIPPED | OUTPATIENT
Start: 2020-04-02 | End: 2020-07-05

## 2020-04-06 DIAGNOSIS — E55.9 VITAMIN D DEFICIENCY: ICD-10-CM

## 2020-04-06 RX ORDER — ERGOCALCIFEROL 1.25 MG/1
CAPSULE ORAL
Qty: 12 CAPSULE | Refills: 1 | Status: SHIPPED | OUTPATIENT
Start: 2020-04-06 | End: 2020-08-12 | Stop reason: SDUPTHER

## 2020-04-14 DIAGNOSIS — Z86.73 OLD LACUNAR STROKE WITHOUT LATE EFFECT: ICD-10-CM

## 2020-04-14 RX ORDER — CLOPIDOGREL BISULFATE 75 MG/1
TABLET ORAL
Qty: 90 TABLET | Refills: 3 | Status: SHIPPED | OUTPATIENT
Start: 2020-04-14 | End: 2021-06-02

## 2020-05-13 ENCOUNTER — TELEPHONE (OUTPATIENT)
Dept: PRIMARY CARE CLINIC | Facility: CLINIC | Age: 80
End: 2020-05-13

## 2020-05-13 DIAGNOSIS — Z12.31 BREAST CANCER SCREENING BY MAMMOGRAM: Primary | ICD-10-CM

## 2020-05-13 NOTE — TELEPHONE ENCOUNTER
----- Message from Yanique Gaston sent at 5/13/2020  2:48 PM CDT -----  Contact: self/[phone]  Patient called in regards needing to talk with Dr Bullard medical assistant about the mammogram appointment. Patient stated that before scheduling anything she would like to talk with pcp, or medical assistant. Please call and advise. Thank you

## 2020-08-11 ENCOUNTER — LAB VISIT (OUTPATIENT)
Dept: LAB | Facility: HOSPITAL | Age: 80
End: 2020-08-11
Attending: INTERNAL MEDICINE
Payer: MEDICARE

## 2020-08-11 ENCOUNTER — OFFICE VISIT (OUTPATIENT)
Dept: PRIMARY CARE CLINIC | Facility: CLINIC | Age: 80
End: 2020-08-11
Payer: MEDICARE

## 2020-08-11 ENCOUNTER — IMMUNIZATION (OUTPATIENT)
Dept: PHARMACY | Facility: CLINIC | Age: 80
End: 2020-08-11
Payer: MEDICARE

## 2020-08-11 VITALS
RESPIRATION RATE: 18 BRPM | DIASTOLIC BLOOD PRESSURE: 65 MMHG | WEIGHT: 133.63 LBS | SYSTOLIC BLOOD PRESSURE: 117 MMHG | HEIGHT: 67 IN | TEMPERATURE: 98 F | BODY MASS INDEX: 20.97 KG/M2 | HEART RATE: 79 BPM | OXYGEN SATURATION: 98 %

## 2020-08-11 DIAGNOSIS — I65.23 BILATERAL CAROTID ARTERY STENOSIS: ICD-10-CM

## 2020-08-11 DIAGNOSIS — E55.9 VITAMIN D DEFICIENCY: ICD-10-CM

## 2020-08-11 DIAGNOSIS — I11.9 HYPERTENSIVE HEART DISEASE WITHOUT HEART FAILURE: ICD-10-CM

## 2020-08-11 DIAGNOSIS — I70.0 THORACIC AORTA ATHEROSCLEROSIS: ICD-10-CM

## 2020-08-11 DIAGNOSIS — M85.80 OSTEOPENIA, UNSPECIFIED LOCATION: ICD-10-CM

## 2020-08-11 DIAGNOSIS — J30.2 SEASONAL ALLERGIC RHINITIS, UNSPECIFIED TRIGGER: ICD-10-CM

## 2020-08-11 DIAGNOSIS — Z23 NEED FOR PNEUMOCOCCAL VACCINE: ICD-10-CM

## 2020-08-11 DIAGNOSIS — I11.9 HYPERTENSIVE HEART DISEASE WITHOUT HEART FAILURE: Primary | ICD-10-CM

## 2020-08-11 LAB
25(OH)D3+25(OH)D2 SERPL-MCNC: 34 NG/ML (ref 30–96)
ALBUMIN SERPL BCP-MCNC: 3.9 G/DL (ref 3.5–5.2)
ALP SERPL-CCNC: 73 U/L (ref 55–135)
ALT SERPL W/O P-5'-P-CCNC: 11 U/L (ref 10–44)
ANION GAP SERPL CALC-SCNC: 7 MMOL/L (ref 8–16)
AST SERPL-CCNC: 18 U/L (ref 10–40)
BASOPHILS # BLD AUTO: 0.03 K/UL (ref 0–0.2)
BASOPHILS NFR BLD: 0.5 % (ref 0–1.9)
BILIRUB SERPL-MCNC: 1.3 MG/DL (ref 0.1–1)
BUN SERPL-MCNC: 5 MG/DL (ref 8–23)
CALCIUM SERPL-MCNC: 9.5 MG/DL (ref 8.7–10.5)
CHLORIDE SERPL-SCNC: 97 MMOL/L (ref 95–110)
CHOLEST SERPL-MCNC: 173 MG/DL (ref 120–199)
CHOLEST/HDLC SERPL: 1.9 {RATIO} (ref 2–5)
CO2 SERPL-SCNC: 33 MMOL/L (ref 23–29)
CREAT SERPL-MCNC: 0.7 MG/DL (ref 0.5–1.4)
DIFFERENTIAL METHOD: ABNORMAL
EOSINOPHIL # BLD AUTO: 0.1 K/UL (ref 0–0.5)
EOSINOPHIL NFR BLD: 2 % (ref 0–8)
ERYTHROCYTE [DISTWIDTH] IN BLOOD BY AUTOMATED COUNT: 13.6 % (ref 11.5–14.5)
EST. GFR  (AFRICAN AMERICAN): >60 ML/MIN/1.73 M^2
EST. GFR  (NON AFRICAN AMERICAN): >60 ML/MIN/1.73 M^2
GLUCOSE SERPL-MCNC: 88 MG/DL (ref 70–110)
HCT VFR BLD AUTO: 41.6 % (ref 37–48.5)
HDLC SERPL-MCNC: 93 MG/DL (ref 40–75)
HDLC SERPL: 53.8 % (ref 20–50)
HGB BLD-MCNC: 13.1 G/DL (ref 12–16)
IMM GRANULOCYTES # BLD AUTO: 0.01 K/UL (ref 0–0.04)
IMM GRANULOCYTES NFR BLD AUTO: 0.2 % (ref 0–0.5)
LDLC SERPL CALC-MCNC: 67.4 MG/DL (ref 63–159)
LYMPHOCYTES # BLD AUTO: 1.6 K/UL (ref 1–4.8)
LYMPHOCYTES NFR BLD: 28.4 % (ref 18–48)
MCH RBC QN AUTO: 30.5 PG (ref 27–31)
MCHC RBC AUTO-ENTMCNC: 31.5 G/DL (ref 32–36)
MCV RBC AUTO: 97 FL (ref 82–98)
MONOCYTES # BLD AUTO: 0.7 K/UL (ref 0.3–1)
MONOCYTES NFR BLD: 12.3 % (ref 4–15)
NEUTROPHILS # BLD AUTO: 3.2 K/UL (ref 1.8–7.7)
NEUTROPHILS NFR BLD: 56.6 % (ref 38–73)
NONHDLC SERPL-MCNC: 80 MG/DL
NRBC BLD-RTO: 0 /100 WBC
PLATELET # BLD AUTO: 249 K/UL (ref 150–350)
PMV BLD AUTO: 10.1 FL (ref 9.2–12.9)
POTASSIUM SERPL-SCNC: 3.5 MMOL/L (ref 3.5–5.1)
PROT SERPL-MCNC: 7 G/DL (ref 6–8.4)
RBC # BLD AUTO: 4.3 M/UL (ref 4–5.4)
SODIUM SERPL-SCNC: 137 MMOL/L (ref 136–145)
TRIGL SERPL-MCNC: 63 MG/DL (ref 30–150)
WBC # BLD AUTO: 5.6 K/UL (ref 3.9–12.7)

## 2020-08-11 PROCEDURE — 3074F SYST BP LT 130 MM HG: CPT | Mod: CPTII,S$GLB,, | Performed by: INTERNAL MEDICINE

## 2020-08-11 PROCEDURE — 3078F PR MOST RECENT DIASTOLIC BLOOD PRESSURE < 80 MM HG: ICD-10-PCS | Mod: CPTII,S$GLB,, | Performed by: INTERNAL MEDICINE

## 2020-08-11 PROCEDURE — 1101F PR PT FALLS ASSESS DOC 0-1 FALLS W/OUT INJ PAST YR: ICD-10-PCS | Mod: CPTII,S$GLB,, | Performed by: INTERNAL MEDICINE

## 2020-08-11 PROCEDURE — 36415 COLL VENOUS BLD VENIPUNCTURE: CPT | Mod: PN

## 2020-08-11 PROCEDURE — 80061 LIPID PANEL: CPT

## 2020-08-11 PROCEDURE — 1101F PT FALLS ASSESS-DOCD LE1/YR: CPT | Mod: CPTII,S$GLB,, | Performed by: INTERNAL MEDICINE

## 2020-08-11 PROCEDURE — 1159F PR MEDICATION LIST DOCUMENTED IN MEDICAL RECORD: ICD-10-PCS | Mod: S$GLB,,, | Performed by: INTERNAL MEDICINE

## 2020-08-11 PROCEDURE — 99214 OFFICE O/P EST MOD 30 MIN: CPT | Mod: S$GLB,,, | Performed by: INTERNAL MEDICINE

## 2020-08-11 PROCEDURE — 85025 COMPLETE CBC W/AUTO DIFF WBC: CPT

## 2020-08-11 PROCEDURE — 82306 VITAMIN D 25 HYDROXY: CPT

## 2020-08-11 PROCEDURE — 3078F DIAST BP <80 MM HG: CPT | Mod: CPTII,S$GLB,, | Performed by: INTERNAL MEDICINE

## 2020-08-11 PROCEDURE — 80053 COMPREHEN METABOLIC PANEL: CPT

## 2020-08-11 PROCEDURE — 3074F PR MOST RECENT SYSTOLIC BLOOD PRESSURE < 130 MM HG: ICD-10-PCS | Mod: CPTII,S$GLB,, | Performed by: INTERNAL MEDICINE

## 2020-08-11 PROCEDURE — 1126F PR PAIN SEVERITY QUANTIFIED, NO PAIN PRESENT: ICD-10-PCS | Mod: S$GLB,,, | Performed by: INTERNAL MEDICINE

## 2020-08-11 PROCEDURE — 1159F MED LIST DOCD IN RCRD: CPT | Mod: S$GLB,,, | Performed by: INTERNAL MEDICINE

## 2020-08-11 PROCEDURE — 99999 PR PBB SHADOW E&M-EST. PATIENT-LVL IV: ICD-10-PCS | Mod: PBBFAC,,, | Performed by: INTERNAL MEDICINE

## 2020-08-11 PROCEDURE — 99214 PR OFFICE/OUTPT VISIT, EST, LEVL IV, 30-39 MIN: ICD-10-PCS | Mod: S$GLB,,, | Performed by: INTERNAL MEDICINE

## 2020-08-11 PROCEDURE — 99999 PR PBB SHADOW E&M-EST. PATIENT-LVL IV: CPT | Mod: PBBFAC,,, | Performed by: INTERNAL MEDICINE

## 2020-08-11 PROCEDURE — 1126F AMNT PAIN NOTED NONE PRSNT: CPT | Mod: S$GLB,,, | Performed by: INTERNAL MEDICINE

## 2020-08-11 RX ORDER — FLUTICASONE PROPIONATE 50 MCG
2 SPRAY, SUSPENSION (ML) NASAL DAILY
Qty: 16 G | Refills: 5 | Status: SHIPPED | OUTPATIENT
Start: 2020-08-11

## 2020-08-12 RX ORDER — ERGOCALCIFEROL 1.25 MG/1
CAPSULE ORAL
Qty: 12 CAPSULE | Refills: 2 | Status: SHIPPED | OUTPATIENT
Start: 2020-08-12 | End: 2021-06-13

## 2020-08-13 NOTE — PROGRESS NOTES
Subjective:       Patient ID: Jessenia Da Silva is a 80 y.o. female.    Chief Complaint: Follow-up    Last seen 10 months ago. Returns for annual exam. No acute complaints.    PMH:   Hypertension with concentric remodeling on echo , normal LV function.   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   Cervical spondylosis.  Mild Bilateral Carotid Artery Disease on ultrasound 3/18.  Osteopenia.     PSH: Tonsillectomy. Hysterectomy and BSO. Lasik right eye. Bilateral Cataract extraction.    Mammogram normal  - declines this year. BMD 3/18. EKG normal 3/18. Colonoscopy normal >10 yrs ago. Prevnar 3/19. Flu shot . Tdap 10/19.     Social: Social tobacco use up to 3 cigarettes on occasion. Occasional alcohol. , adult son lives locally - he is a heart and kidney transplant recipient. Retired consumer credit counselor.     FMH: HTN in father, dementia in mother, pancreatic cancer in sister.     NKDA.     Medications: Amlodipine 5mg daily, Pravastatin 10mg daily, Plavix 75mg daily, Flonase prn, Vitamin D 50,000 weekly. Not taking Xyzal.    Review of Systems   Constitutional: Negative for activity change, appetite change, fatigue, fever and unexpected weight change.   HENT: Negative for nasal congestion, ear pain, hearing loss, rhinorrhea, sneezing, sore throat, trouble swallowing and voice change.    Eyes: Negative for pain and visual disturbance.   Respiratory: Negative for cough, chest tightness, shortness of breath and wheezing.    Cardiovascular: Negative for chest pain, palpitations and leg swelling.   Gastrointestinal: Negative for abdominal pain, blood in stool, constipation, diarrhea, nausea and vomiting.   Genitourinary: Negative for dysuria, frequency, hematuria and vaginal bleeding.   Musculoskeletal: Negative for arthralgias, gait problem, joint swelling and myalgias.   Integumentary:  Negative for color change and  "rash.   Neurological: Negative for dizziness, syncope, facial asymmetry, speech difficulty, weakness, numbness and headaches.   Hematological: Negative for adenopathy. Does not bruise/bleed easily.   Psychiatric/Behavioral: Negative for confusion, dysphoric mood and sleep disturbance. The patient is not nervous/anxious.          Objective:    /65, Pulse 79, Temp 98, O2 Sat 98%, Ht 5' 7", Wt 133.6 lbs  Physical Exam  Vitals signs reviewed.   Constitutional:       General: She is not in acute distress.     Appearance: Normal appearance. She is well-developed. She is not diaphoretic.   HENT:      Head: Normocephalic and atraumatic.      Right Ear: Tympanic membrane and ear canal normal.      Left Ear: Tympanic membrane and ear canal normal.   Eyes:      General: No scleral icterus.     Extraocular Movements: Extraocular movements intact.      Conjunctiva/sclera: Conjunctivae normal.      Right eye: Right conjunctiva is not injected.      Left eye: Left conjunctiva is not injected.      Pupils: Pupils are equal, round, and reactive to light.   Neck:      Musculoskeletal: Normal range of motion and neck supple.      Thyroid: No thyromegaly.      Vascular: No carotid bruit or JVD.   Cardiovascular:      Rate and Rhythm: Normal rate and regular rhythm.      Pulses: Normal pulses.      Heart sounds: Normal heart sounds. No murmur. No friction rub. No gallop.    Pulmonary:      Effort: Pulmonary effort is normal. No respiratory distress.      Breath sounds: Normal breath sounds. No wheezing, rhonchi or rales.   Abdominal:      General: Bowel sounds are normal. There is no distension.      Palpations: Abdomen is soft. There is no mass.      Tenderness: There is no abdominal tenderness.   Musculoskeletal: Normal range of motion.         General: No tenderness or deformity.      Right lower leg: No edema.      Left lower leg: No edema.   Lymphadenopathy:      Cervical: No cervical adenopathy.   Skin:     General: Skin is " warm and dry.      Coloration: Skin is not pale.      Findings: No erythema or rash.      Nails: There is no clubbing.     Neurological:      General: No focal deficit present.      Mental Status: She is alert and oriented to person, place, and time.      Cranial Nerves: No cranial nerve deficit.      Motor: No abnormal muscle tone.      Coordination: Coordination normal.      Gait: Gait normal.      Deep Tendon Reflexes: Reflexes are normal and symmetric.   Psychiatric:         Mood and Affect: Mood normal.         Behavior: Behavior normal.         Thought Content: Thought content normal.         Judgment: Judgment normal.         Assessment:       1. Hypertensive heart disease without heart failure    2. Bilateral carotid artery stenosis    3. Thoracic aorta atherosclerosis    4. Vitamin D deficiency    5. Osteopenia, unspecified location    6. Seasonal allergic rhinitis, unspecified trigger    7. Need for pneumococcal vaccine        Plan:       Hypertensive heart disease without heart failure - consistently well controlled.   -     CBC auto differential; Future; Expected date: 08/11/2020  -     Comprehensive metabolic panel; Future; Expected date: 08/11/2020    Bilateral carotid artery stenosis  -     Lipid Panel; Future; Expected date: 08/11/2020    Thoracic aorta atherosclerosis       -      Continue Pravastatin.    Vitamin D deficiency  -     Vitamin D; Future; Expected date: 08/11/2020    Osteopenia, unspecified location        -     Bone Density test due next year.    Seasonal allergic rhinitis, unspecified trigger  -     fluticasone propionate (FLONASE) 50 mcg/actuation nasal spray; 2 sprays (100 mcg total) by Each Nostril route once daily.  Dispense: 16 g; Refill: 5    Need for pneumococcal vaccine - Pneumovax 23 today.

## 2020-09-16 ENCOUNTER — IMMUNIZATION (OUTPATIENT)
Dept: PHARMACY | Facility: CLINIC | Age: 80
End: 2020-09-16
Payer: MEDICARE

## 2020-09-24 ENCOUNTER — OFFICE VISIT (OUTPATIENT)
Dept: URGENT CARE | Facility: CLINIC | Age: 80
End: 2020-09-24
Payer: MEDICARE

## 2020-09-24 VITALS
RESPIRATION RATE: 18 BRPM | BODY MASS INDEX: 20.4 KG/M2 | HEART RATE: 74 BPM | WEIGHT: 130 LBS | DIASTOLIC BLOOD PRESSURE: 81 MMHG | TEMPERATURE: 98 F | OXYGEN SATURATION: 98 % | SYSTOLIC BLOOD PRESSURE: 123 MMHG | HEIGHT: 67 IN

## 2020-09-24 DIAGNOSIS — L72.9 SUBCUTANEOUS CYST: Primary | ICD-10-CM

## 2020-09-24 PROCEDURE — 99213 OFFICE O/P EST LOW 20 MIN: CPT | Mod: S$GLB,,, | Performed by: FAMILY MEDICINE

## 2020-09-24 PROCEDURE — 99213 PR OFFICE/OUTPT VISIT, EST, LEVL III, 20-29 MIN: ICD-10-PCS | Mod: S$GLB,,, | Performed by: FAMILY MEDICINE

## 2020-09-24 NOTE — PROGRESS NOTES
"Subjective:       Patient ID: Jessenia Da Silva is a 80 y.o. female.    Vitals:  height is 5' 7" (1.702 m) and weight is 59 kg (130 lb). Her temperature is 98.3 °F (36.8 °C). Her blood pressure is 123/81 and her pulse is 74. Her respiration is 18 and oxygen saturation is 98%.     Chief Complaint: Abscess    Pt presents complaining of a cyst on her lower right leg that she has had for years. Pt states the cyst is not swelling abnormally or painful has not noticed any change to it. Denies leg pain.     Abscess  Chronicity:  NewProgression Since Onset: unchanged  Location:  Leg  Associated Symptoms: no fever, no chills, no sweats  Characteristics: swelling    Characteristics: not draining, no itching, not painful, no redness, no dryness, no scaling, no peeling, no bruising and no blistering    Pain Scale:  1/10  Treatments Tried:  Nothing  Relieved by:  Nothing  Worsened by:  Nothing      Constitution: Negative for chills and fever.   HENT: Negative for facial swelling and sore throat.    Neck: Negative for painful lymph nodes.   Eyes: Negative for eye itching and eyelid swelling.   Respiratory: Negative for cough.    Musculoskeletal: Negative for joint pain and joint swelling.   Skin: Positive for abscess. Negative for color change, pale, rash, wound, abrasion, laceration, lesion, skin thickening/induration, puncture wound, erythema, bruising, avulsion and hives.   Allergic/Immunologic: Negative for environmental allergies, immunocompromised state and hives.   Hematologic/Lymphatic: Negative for swollen lymph nodes.       Objective:      Physical Exam   Constitutional: She is oriented to person, place, and time. She appears well-developed. She is cooperative.  Non-toxic appearance. She does not appear ill. No distress.   HENT:   Head: Normocephalic and atraumatic.   Ears:   Right Ear: Hearing, tympanic membrane, external ear and ear canal normal.   Left Ear: Hearing, tympanic membrane, external ear and ear canal " normal.   Nose: Nose normal. No mucosal edema, rhinorrhea or nasal deformity. No epistaxis. Right sinus exhibits no maxillary sinus tenderness and no frontal sinus tenderness. Left sinus exhibits no maxillary sinus tenderness and no frontal sinus tenderness.   Mouth/Throat: Uvula is midline, oropharynx is clear and moist and mucous membranes are normal. No trismus in the jaw. Normal dentition. No uvula swelling. No posterior oropharyngeal erythema.   Eyes: Conjunctivae and lids are normal. Right eye exhibits no discharge. Left eye exhibits no discharge. No scleral icterus.   Neck: Trachea normal, normal range of motion, full passive range of motion without pain and phonation normal. Neck supple.   Cardiovascular: Normal rate, regular rhythm, normal heart sounds and normal pulses.   No murmur heard.  Pulmonary/Chest: Effort normal and breath sounds normal. No stridor. No respiratory distress. She has no wheezes. She has no rhonchi. She has no rales.   Abdominal: Soft. Normal appearance and bowel sounds are normal. She exhibits no distension, no pulsatile midline mass and no mass. There is no abdominal tenderness.   Musculoskeletal: Normal range of motion.         General: No deformity.   Neurological: She is alert and oriented to person, place, and time. She exhibits normal muscle tone. Coordination normal.   Skin: Skin is warm, dry, intact, not diaphoretic, not pale and no rash. Lesions:  lesionbruising and erythema        Comments: Right lower leg, lateral aspect:  +ve about 0.5cm subcutaneous cyst. Consistency: firm. No tenderness. No pigmentation. No redness. FROM. Neurovascular intact.  jaundicePsychiatric: Her speech is normal and behavior is normal. Judgment and thought content normal.   Nursing note and vitals reviewed.        Assessment:       1. Subcutaneous cyst        Plan:         Subcutaneous cyst  -     Ambulatory referral/consult to Dermatology        PLEASE READ YOUR DISCHARGE INSTRUCTIONS ENTIRELY  AS IT CONTAINS IMPORTANT INFORMATION.    Please return here or go to the Emergency Department for any concerns or worsening of condition.      If not allergic, please take over the counter Tylenol (Acetaminophen) and/or Motrin (Ibuprofen) as directed for control of pain and/or fever.  Please follow up with your primary care doctor or specialist as needed.      If you smoke, please stop smoking.    Please return or see your primary care doctor if you develop new or worsening symptoms.     Please arrange follow up with your primary medical clinic as soon as possible. You must understand that you've received an Urgent Care treatment only and that you may be released before all of your medical problems are known or treated. You, the patient, will arrange for follow up as instructed. If your symptoms worsen or fail to improve you should go to the Emergency Room.  Epidermoid Cyst (Sebaceous Cyst), No Infection  An epidermoid cyst (sebaceous cyst) is a term that refers to 2 similar types of cysts: those found in the skin (epidermoid), and those found around hair follicles (pilar).  Some general facts about these cysts:  · A cyst is a sac filled with material that is often cheesy, fatty, oily, or fibrous. The material in them can be thick (like cottage cheese) or liquid.  · They form slowly under the skin, and can be found on most parts of the body. They are most often found in hairier areas like the scalp, face, upper back, and genitals.  · You can usually move them slightly if you try.  · They can be smaller than a pea or as large as a few inches.  · They are usually not painful, unless they become inflamed or infected.  · The area around the cyst may smell bad. If the cyst breaks open, the material inside it often smells bad too.  Causes  Epidermoid cysts are caused when skin (epidermal) cells move under the skin surface, or are covered over by it. These cells continue to multiply, like skin does normally. They then form  a wall around themselves (cyst) and secrete normal skin fluids (keratin). This may be developmental. But it often happens because of an injury to the skin.  Epidermoid cysts are often found around hair follicles. These follicles are like cysts, but they have openings. Normal lubricating oils for your hair are sent out through these openings. A cyst occurs when an opening becomes blocked or the site inflamed. This often occurs when there is damage to the hair follicles by a scrape or wound.    Pilar cysts are similar to epidermoid cysts. But they start from a different part of the hair follicle, and are more likely to be on the scalp.  Symptoms  · Feeling a lump just beneath the skin  · It may or may not be painful  · The cyst may or may not smell bad  · The cyst may become inflamed or red  · The cyst may leak fluid or thick material  Home care  Epidermoid cysts often go away without any treatment. If your cyst doesnt go away, and it bothers you, it may be drained or removed. If the cyst drains on its own, it may return. Resist the temptation to squeeze, pop, stick a needle in it, or cut it open. This often leads to an infection and scarring. If it gets severely inflamed or infected, you should seek medical care. Be sure to clean the cyst area when bathing or showering. Watch for the signs of infection listed below.  Follow-up care  Follow up with your healthcare provider, or as advised.  When to seek medical advice  Call your healthcare provider right away if any of these occur:  · Swelling, redness, or pain  · Pus coming from the cyst  Date Last Reviewed: 8/1/2016  © 7368-5339 [employer]. [address] [place] [address], [place] [address]. All rights reserved. This information is not intended as a substitute for professional medical care. Always follow your healthcare professional's instructions.

## 2020-09-24 NOTE — PATIENT INSTRUCTIONS
PLEASE READ YOUR DISCHARGE INSTRUCTIONS ENTIRELY AS IT CONTAINS IMPORTANT INFORMATION.    Please return here or go to the Emergency Department for any concerns or worsening of condition.      If not allergic, please take over the counter Tylenol (Acetaminophen) and/or Motrin (Ibuprofen) as directed for control of pain and/or fever.  Please follow up with your primary care doctor or specialist as needed.      If you smoke, please stop smoking.    Please return or see your primary care doctor if you develop new or worsening symptoms.     Please arrange follow up with your primary medical clinic as soon as possible. You must understand that you've received an Urgent Care treatment only and that you may be released before all of your medical problems are known or treated. You, the patient, will arrange for follow up as instructed. If your symptoms worsen or fail to improve you should go to the Emergency Room.  Epidermoid Cyst (Sebaceous Cyst), No Infection  An epidermoid cyst (sebaceous cyst) is a term that refers to 2 similar types of cysts: those found in the skin (epidermoid), and those found around hair follicles (pilar).  Some general facts about these cysts:  · A cyst is a sac filled with material that is often cheesy, fatty, oily, or fibrous. The material in them can be thick (like cottage cheese) or liquid.  · They form slowly under the skin, and can be found on most parts of the body. They are most often found in hairier areas like the scalp, face, upper back, and genitals.  · You can usually move them slightly if you try.  · They can be smaller than a pea or as large as a few inches.  · They are usually not painful, unless they become inflamed or infected.  · The area around the cyst may smell bad. If the cyst breaks open, the material inside it often smells bad too.  Causes  Epidermoid cysts are caused when skin (epidermal) cells move under the skin surface, or are covered over by it. These cells continue to  multiply, like skin does normally. They then form a wall around themselves (cyst) and secrete normal skin fluids (keratin). This may be developmental. But it often happens because of an injury to the skin.  Epidermoid cysts are often found around hair follicles. These follicles are like cysts, but they have openings. Normal lubricating oils for your hair are sent out through these openings. A cyst occurs when an opening becomes blocked or the site inflamed. This often occurs when there is damage to the hair follicles by a scrape or wound.    Pilar cysts are similar to epidermoid cysts. But they start from a different part of the hair follicle, and are more likely to be on the scalp.  Symptoms  · Feeling a lump just beneath the skin  · It may or may not be painful  · The cyst may or may not smell bad  · The cyst may become inflamed or red  · The cyst may leak fluid or thick material  Home care  Epidermoid cysts often go away without any treatment. If your cyst doesnt go away, and it bothers you, it may be drained or removed. If the cyst drains on its own, it may return. Resist the temptation to squeeze, pop, stick a needle in it, or cut it open. This often leads to an infection and scarring. If it gets severely inflamed or infected, you should seek medical care. Be sure to clean the cyst area when bathing or showering. Watch for the signs of infection listed below.  Follow-up care  Follow up with your healthcare provider, or as advised.  When to seek medical advice  Call your healthcare provider right away if any of these occur:  · Swelling, redness, or pain  · Pus coming from the cyst  Date Last Reviewed: 8/1/2016 © 2000-2017 [employer]. [address] [place] [id number]. All rights reserved. This information is not intended as a substitute for professional medical care. Always follow your healthcare professional's instructions.

## 2021-01-06 DIAGNOSIS — I11.9 HYPERTENSIVE HEART DISEASE WITHOUT HEART FAILURE: ICD-10-CM

## 2021-01-06 RX ORDER — AMLODIPINE BESYLATE 5 MG/1
5 TABLET ORAL DAILY
Qty: 90 TABLET | Refills: 1 | Status: SHIPPED | OUTPATIENT
Start: 2021-01-06 | End: 2021-07-05

## 2021-01-10 ENCOUNTER — IMMUNIZATION (OUTPATIENT)
Dept: INTERNAL MEDICINE | Facility: CLINIC | Age: 81
End: 2021-01-10
Payer: MEDICARE

## 2021-01-10 DIAGNOSIS — Z23 NEED FOR VACCINATION: ICD-10-CM

## 2021-01-10 PROCEDURE — 91300 COVID-19, MRNA, LNP-S, PF, 30 MCG/0.3 ML DOSE VACCINE: CPT | Mod: PBBFAC

## 2021-01-11 DIAGNOSIS — I77.9 BILATERAL CAROTID ARTERY DISEASE: ICD-10-CM

## 2021-01-11 RX ORDER — PRAVASTATIN SODIUM 10 MG/1
10 TABLET ORAL DAILY
Qty: 90 TABLET | Refills: 1 | Status: SHIPPED | OUTPATIENT
Start: 2021-01-11 | End: 2021-07-05

## 2021-01-31 ENCOUNTER — IMMUNIZATION (OUTPATIENT)
Dept: INTERNAL MEDICINE | Facility: CLINIC | Age: 81
End: 2021-01-31
Payer: MEDICARE

## 2021-01-31 DIAGNOSIS — Z23 NEED FOR VACCINATION: Primary | ICD-10-CM

## 2021-01-31 PROCEDURE — 91300 COVID-19, MRNA, LNP-S, PF, 30 MCG/0.3 ML DOSE VACCINE: CPT | Mod: PBBFAC | Performed by: INTERNAL MEDICINE

## 2021-01-31 PROCEDURE — 0002A COVID-19, MRNA, LNP-S, PF, 30 MCG/0.3 ML DOSE VACCINE: CPT | Mod: PBBFAC | Performed by: INTERNAL MEDICINE

## 2021-08-10 ENCOUNTER — CLINICAL SUPPORT (OUTPATIENT)
Dept: URGENT CARE | Facility: CLINIC | Age: 81
End: 2021-08-10
Payer: MEDICARE

## 2021-08-10 DIAGNOSIS — Z11.52 ENCOUNTER FOR SCREENING FOR COVID-19: Primary | ICD-10-CM

## 2021-08-10 LAB
CTP QC/QA: YES
SARS-COV-2 RDRP RESP QL NAA+PROBE: NEGATIVE

## 2021-08-10 PROCEDURE — U0002: ICD-10-PCS | Mod: QW,S$GLB,, | Performed by: FAMILY MEDICINE

## 2021-08-10 PROCEDURE — U0002 COVID-19 LAB TEST NON-CDC: HCPCS | Mod: QW,S$GLB,, | Performed by: FAMILY MEDICINE

## 2021-08-16 ENCOUNTER — OFFICE VISIT (OUTPATIENT)
Dept: PRIMARY CARE CLINIC | Facility: CLINIC | Age: 81
End: 2021-08-16
Payer: MEDICARE

## 2021-08-16 VITALS
TEMPERATURE: 99 F | HEIGHT: 67 IN | DIASTOLIC BLOOD PRESSURE: 60 MMHG | SYSTOLIC BLOOD PRESSURE: 110 MMHG | WEIGHT: 131.19 LBS | HEART RATE: 82 BPM | BODY MASS INDEX: 20.59 KG/M2 | OXYGEN SATURATION: 98 %

## 2021-08-16 DIAGNOSIS — R06.89 HYPERCARBIA: ICD-10-CM

## 2021-08-16 DIAGNOSIS — Z23 NEED FOR SHINGLES VACCINE: ICD-10-CM

## 2021-08-16 DIAGNOSIS — E55.9 VITAMIN D DEFICIENCY: ICD-10-CM

## 2021-08-16 DIAGNOSIS — F17.200 TOBACCO USE DISORDER: ICD-10-CM

## 2021-08-16 DIAGNOSIS — J30.2 SEASONAL ALLERGIC RHINITIS, UNSPECIFIED TRIGGER: ICD-10-CM

## 2021-08-16 DIAGNOSIS — M81.0 OSTEOPOROSIS WITHOUT CURRENT PATHOLOGICAL FRACTURE, UNSPECIFIED OSTEOPOROSIS TYPE: ICD-10-CM

## 2021-08-16 DIAGNOSIS — I70.0 THORACIC AORTA ATHEROSCLEROSIS: ICD-10-CM

## 2021-08-16 DIAGNOSIS — Z86.73 OLD LACUNAR STROKE WITHOUT LATE EFFECT: ICD-10-CM

## 2021-08-16 DIAGNOSIS — I65.23 BILATERAL CAROTID ARTERY STENOSIS: ICD-10-CM

## 2021-08-16 DIAGNOSIS — I11.9 HYPERTENSIVE HEART DISEASE WITHOUT HEART FAILURE: Primary | ICD-10-CM

## 2021-08-16 PROCEDURE — 1159F MED LIST DOCD IN RCRD: CPT | Mod: CPTII,S$GLB,, | Performed by: INTERNAL MEDICINE

## 2021-08-16 PROCEDURE — 1160F PR REVIEW ALL MEDS BY PRESCRIBER/CLIN PHARMACIST DOCUMENTED: ICD-10-PCS | Mod: CPTII,S$GLB,, | Performed by: INTERNAL MEDICINE

## 2021-08-16 PROCEDURE — 3078F PR MOST RECENT DIASTOLIC BLOOD PRESSURE < 80 MM HG: ICD-10-PCS | Mod: CPTII,S$GLB,, | Performed by: INTERNAL MEDICINE

## 2021-08-16 PROCEDURE — 99215 OFFICE O/P EST HI 40 MIN: CPT | Mod: S$GLB,,, | Performed by: INTERNAL MEDICINE

## 2021-08-16 PROCEDURE — 1126F AMNT PAIN NOTED NONE PRSNT: CPT | Mod: CPTII,S$GLB,, | Performed by: INTERNAL MEDICINE

## 2021-08-16 PROCEDURE — 99999 PR PBB SHADOW E&M-EST. PATIENT-LVL IV: ICD-10-PCS | Mod: PBBFAC,,, | Performed by: INTERNAL MEDICINE

## 2021-08-16 PROCEDURE — 3288F FALL RISK ASSESSMENT DOCD: CPT | Mod: CPTII,S$GLB,, | Performed by: INTERNAL MEDICINE

## 2021-08-16 PROCEDURE — 99999 PR PBB SHADOW E&M-EST. PATIENT-LVL IV: CPT | Mod: PBBFAC,,, | Performed by: INTERNAL MEDICINE

## 2021-08-16 PROCEDURE — 99215 PR OFFICE/OUTPT VISIT, EST, LEVL V, 40-54 MIN: ICD-10-PCS | Mod: S$GLB,,, | Performed by: INTERNAL MEDICINE

## 2021-08-16 PROCEDURE — 1101F PT FALLS ASSESS-DOCD LE1/YR: CPT | Mod: CPTII,S$GLB,, | Performed by: INTERNAL MEDICINE

## 2021-08-16 PROCEDURE — 1126F PR PAIN SEVERITY QUANTIFIED, NO PAIN PRESENT: ICD-10-PCS | Mod: CPTII,S$GLB,, | Performed by: INTERNAL MEDICINE

## 2021-08-16 PROCEDURE — 3078F DIAST BP <80 MM HG: CPT | Mod: CPTII,S$GLB,, | Performed by: INTERNAL MEDICINE

## 2021-08-16 PROCEDURE — 1101F PR PT FALLS ASSESS DOC 0-1 FALLS W/OUT INJ PAST YR: ICD-10-PCS | Mod: CPTII,S$GLB,, | Performed by: INTERNAL MEDICINE

## 2021-08-16 PROCEDURE — 3074F SYST BP LT 130 MM HG: CPT | Mod: CPTII,S$GLB,, | Performed by: INTERNAL MEDICINE

## 2021-08-16 PROCEDURE — 1159F PR MEDICATION LIST DOCUMENTED IN MEDICAL RECORD: ICD-10-PCS | Mod: CPTII,S$GLB,, | Performed by: INTERNAL MEDICINE

## 2021-08-16 PROCEDURE — 3074F PR MOST RECENT SYSTOLIC BLOOD PRESSURE < 130 MM HG: ICD-10-PCS | Mod: CPTII,S$GLB,, | Performed by: INTERNAL MEDICINE

## 2021-08-16 PROCEDURE — 3288F PR FALLS RISK ASSESSMENT DOCUMENTED: ICD-10-PCS | Mod: CPTII,S$GLB,, | Performed by: INTERNAL MEDICINE

## 2021-08-16 PROCEDURE — 1160F RVW MEDS BY RX/DR IN RCRD: CPT | Mod: CPTII,S$GLB,, | Performed by: INTERNAL MEDICINE

## 2021-08-16 RX ORDER — AMLODIPINE BESYLATE 5 MG/1
5 TABLET ORAL DAILY
Qty: 90 TABLET | Refills: 1 | Status: SHIPPED | OUTPATIENT
Start: 2021-08-16 | End: 2022-03-18

## 2021-08-16 RX ORDER — CLOPIDOGREL BISULFATE 75 MG/1
75 TABLET ORAL DAILY
Qty: 90 TABLET | Refills: 1 | Status: SHIPPED | OUTPATIENT
Start: 2021-08-16 | End: 2021-12-21

## 2021-08-18 ENCOUNTER — HOSPITAL ENCOUNTER (OUTPATIENT)
Dept: RADIOLOGY | Facility: HOSPITAL | Age: 81
Discharge: HOME | End: 2021-08-18
Attending: INTERNAL MEDICINE
Payer: MEDICARE

## 2021-08-18 DIAGNOSIS — F17.200 TOBACCO USE DISORDER: ICD-10-CM

## 2021-08-18 DIAGNOSIS — R06.89 HYPERCARBIA: ICD-10-CM

## 2021-08-18 PROCEDURE — 71046 X-RAY EXAM CHEST 2 VIEWS: CPT | Mod: 26,,, | Performed by: RADIOLOGY

## 2021-08-18 PROCEDURE — 71046 X-RAY EXAM CHEST 2 VIEWS: CPT | Mod: TC,PN

## 2021-08-18 PROCEDURE — 71046 XR CHEST PA AND LATERAL: ICD-10-PCS | Mod: 26,,, | Performed by: RADIOLOGY

## 2021-08-20 ENCOUNTER — HOSPITAL ENCOUNTER (OUTPATIENT)
Dept: RADIOLOGY | Facility: OTHER | Age: 81
Discharge: HOME | End: 2021-08-20
Attending: INTERNAL MEDICINE
Payer: MEDICARE

## 2021-08-20 ENCOUNTER — TELEPHONE (OUTPATIENT)
Dept: PULMONOLOGY | Facility: CLINIC | Age: 81
End: 2021-08-20

## 2021-08-20 DIAGNOSIS — M81.0 OSTEOPOROSIS WITHOUT CURRENT PATHOLOGICAL FRACTURE, UNSPECIFIED OSTEOPOROSIS TYPE: ICD-10-CM

## 2021-08-20 PROCEDURE — 77080 DEXA BONE DENSITY SPINE HIP: ICD-10-PCS | Mod: 26,,, | Performed by: RADIOLOGY

## 2021-08-20 PROCEDURE — 77080 DXA BONE DENSITY AXIAL: CPT | Mod: TC

## 2021-08-20 PROCEDURE — 77080 DXA BONE DENSITY AXIAL: CPT | Mod: 26,,, | Performed by: RADIOLOGY

## 2021-08-24 ENCOUNTER — TELEPHONE (OUTPATIENT)
Dept: PRIMARY CARE CLINIC | Facility: CLINIC | Age: 81
End: 2021-08-24

## 2021-08-24 ENCOUNTER — TELEPHONE (OUTPATIENT)
Dept: PULMONOLOGY | Facility: CLINIC | Age: 81
End: 2021-08-24

## 2021-08-24 DIAGNOSIS — R06.02 SHORTNESS OF BREATH: ICD-10-CM

## 2021-08-25 ENCOUNTER — HOSPITAL ENCOUNTER (OUTPATIENT)
Dept: PREADMISSION TESTING | Facility: OTHER | Age: 81
Discharge: HOME | End: 2021-08-25
Attending: ANESTHESIOLOGY
Payer: MEDICARE

## 2021-08-25 DIAGNOSIS — R06.02 SHORTNESS OF BREATH: ICD-10-CM

## 2021-08-25 LAB — SARS-COV-2 RDRP RESP QL NAA+PROBE: NEGATIVE

## 2021-08-25 PROCEDURE — U0002 COVID-19 LAB TEST NON-CDC: HCPCS | Performed by: NURSE PRACTITIONER

## 2021-08-27 ENCOUNTER — HOSPITAL ENCOUNTER (OUTPATIENT)
Dept: PULMONOLOGY | Facility: CLINIC | Age: 81
Discharge: HOME | End: 2021-08-27
Payer: MEDICARE

## 2021-08-27 ENCOUNTER — HOSPITAL ENCOUNTER (OUTPATIENT)
Dept: CARDIOLOGY | Facility: HOSPITAL | Age: 81
Discharge: HOME | End: 2021-08-27
Attending: INTERNAL MEDICINE
Payer: MEDICARE

## 2021-08-27 DIAGNOSIS — F17.200 TOBACCO USE DISORDER: ICD-10-CM

## 2021-08-27 DIAGNOSIS — R06.89 HYPERCARBIA: ICD-10-CM

## 2021-08-27 DIAGNOSIS — I65.23 BILATERAL CAROTID ARTERY STENOSIS: ICD-10-CM

## 2021-08-27 LAB
LEFT ARM DIASTOLIC BLOOD PRESSURE: 65 MMHG
LEFT ARM SYSTOLIC BLOOD PRESSURE: 117 MMHG
LEFT CBA DIAS: 7 CM/S
LEFT CBA SYS: 26 CM/S
LEFT CCA DIST DIAS: 11 CM/S
LEFT CCA DIST SYS: 50 CM/S
LEFT CCA MID DIAS: 10 CM/S
LEFT CCA MID SYS: 54 CM/S
LEFT CCA PROX DIAS: 12 CM/S
LEFT CCA PROX SYS: 71 CM/S
LEFT ECA DIAS: 7 CM/S
LEFT ECA SYS: 41 CM/S
LEFT ICA DIST DIAS: 14 CM/S
LEFT ICA DIST SYS: 42 CM/S
LEFT ICA MID DIAS: 10 CM/S
LEFT ICA MID SYS: 44 CM/S
LEFT ICA PROX DIAS: 14 CM/S
LEFT ICA PROX SYS: 55 CM/S
LEFT VERTEBRAL DIAS: 10 CM/S
LEFT VERTEBRAL SYS: 54 CM/S
OHS CV CAROTID RIGHT ICA EDV HIGHEST: 18
OHS CV CAROTID ULTRASOUND LEFT ICA/CCA RATIO: 1.1
OHS CV CAROTID ULTRASOUND RIGHT ICA/CCA RATIO: 1.76
OHS CV PV CAROTID LEFT HIGHEST CCA: 71
OHS CV PV CAROTID LEFT HIGHEST ICA: 55
OHS CV PV CAROTID RIGHT HIGHEST CCA: 59
OHS CV PV CAROTID RIGHT HIGHEST ICA: 72
OHS CV US CAROTID LEFT HIGHEST EDV: 14
RIGHT ARM DIASTOLIC BLOOD PRESSURE: 60 MMHG
RIGHT ARM SYSTOLIC BLOOD PRESSURE: 110 MMHG
RIGHT CBA DIAS: 14 CM/S
RIGHT CBA SYS: 51 CM/S
RIGHT CCA DIST DIAS: 10 CM/S
RIGHT CCA DIST SYS: 41 CM/S
RIGHT CCA MID DIAS: 12 CM/S
RIGHT CCA MID SYS: 54 CM/S
RIGHT CCA PROX DIAS: 8 CM/S
RIGHT CCA PROX SYS: 59 CM/S
RIGHT ECA DIAS: 12 CM/S
RIGHT ECA SYS: 87 CM/S
RIGHT ICA DIST DIAS: 12 CM/S
RIGHT ICA DIST SYS: 44 CM/S
RIGHT ICA MID DIAS: 18 CM/S
RIGHT ICA MID SYS: 72 CM/S
RIGHT ICA PROX DIAS: 8 CM/S
RIGHT ICA PROX SYS: 38 CM/S
RIGHT VERTEBRAL DIAS: 6 CM/S
RIGHT VERTEBRAL SYS: 25 CM/S

## 2021-08-27 PROCEDURE — 93880 CV US DOPPLER CAROTID (CUPID ONLY): ICD-10-PCS | Mod: 26,,, | Performed by: INTERNAL MEDICINE

## 2021-08-27 PROCEDURE — 93880 EXTRACRANIAL BILAT STUDY: CPT

## 2021-08-27 PROCEDURE — 94060 EVALUATION OF WHEEZING: CPT | Mod: S$GLB,,, | Performed by: INTERNAL MEDICINE

## 2021-08-27 PROCEDURE — 93880 EXTRACRANIAL BILAT STUDY: CPT | Mod: 26,,, | Performed by: INTERNAL MEDICINE

## 2021-08-27 PROCEDURE — 94060 PR EVAL OF BRONCHOSPASM: ICD-10-PCS | Mod: S$GLB,,, | Performed by: INTERNAL MEDICINE

## 2021-09-10 LAB
FEF 25 75 LLN: 0.62
FEF 25 75 PRE REF: 30 %
FEF 25 75 REF: 1.52
FET100 CHG: -4 %
FEV05 LLN: 0.7
FEV05 REF: 1.56
FEV1 CHG: 33.1 %
FEV1 FVC LLN: 62
FEV1 FVC PRE REF: 80.3 %
FEV1 FVC REF: 77
FEV1 LLN: 1.32
FEV1 PRE REF: 56.7 %
FEV1 REF: 1.9
FEV1 VOL CHG: 0.36
FVC CHG: 7.4 %
FVC LLN: 1.75
FVC PRE REF: 69.6 %
FVC REF: 2.51
FVC VOL CHG: 0.13
PEF LLN: 3
PEF PRE REF: 58 %
PEF REF: 4.72
PHYSICIAN COMMENT: ABNORMAL
POST FEF 25 75: 1.3 L/S (ref 0.62–2.41)
POST FET 100: 6.38 SEC
POST FEV1 FVC: 76.45 % (ref 61.96–91.67)
POST FEV1: 1.44 L (ref 1.32–2.48)
POST FEV5: 1.16 L (ref 0.7–2.41)
POST FVC: 1.88 L (ref 1.75–3.28)
POST PEF: 3.17 L/S (ref 3–6.44)
PRE FEF 25 75: 0.46 L/S (ref 0.62–2.41)
PRE FET 100: 6.64 SEC
PRE FEV05 REF: 62.9 %
PRE FEV1 FVC: 61.68 % (ref 61.96–91.67)
PRE FEV1: 1.08 L (ref 1.32–2.48)
PRE FEV5: 0.98 L (ref 0.7–2.41)
PRE FVC: 1.75 L (ref 1.75–3.28)
PRE PEF: 2.74 L/S (ref 3–6.44)

## 2021-10-11 ENCOUNTER — IMMUNIZATION (OUTPATIENT)
Dept: INTERNAL MEDICINE | Facility: CLINIC | Age: 81
End: 2021-10-11
Payer: MEDICARE

## 2021-10-11 DIAGNOSIS — Z23 NEED FOR VACCINATION: Primary | ICD-10-CM

## 2021-10-11 PROCEDURE — 91300 COVID-19, MRNA, LNP-S, PF, 30 MCG/0.3 ML DOSE VACCINE: CPT | Mod: PBBFAC | Performed by: INTERNAL MEDICINE

## 2021-10-11 PROCEDURE — 0003A COVID-19, MRNA, LNP-S, PF, 30 MCG/0.3 ML DOSE VACCINE: CPT | Mod: PBBFAC | Performed by: INTERNAL MEDICINE

## 2021-10-14 ENCOUNTER — TELEPHONE (OUTPATIENT)
Dept: PRIMARY CARE CLINIC | Facility: CLINIC | Age: 81
End: 2021-10-14
Payer: MEDICARE

## 2021-12-17 DIAGNOSIS — Z86.73 OLD LACUNAR STROKE WITHOUT LATE EFFECT: ICD-10-CM

## 2021-12-21 RX ORDER — CLOPIDOGREL BISULFATE 75 MG/1
TABLET ORAL
Qty: 90 TABLET | Refills: 2 | Status: SHIPPED | OUTPATIENT
Start: 2021-12-21 | End: 2022-08-30 | Stop reason: SDUPTHER

## 2022-03-15 DIAGNOSIS — I11.9 HYPERTENSIVE HEART DISEASE WITHOUT HEART FAILURE: ICD-10-CM

## 2022-03-15 NOTE — TELEPHONE ENCOUNTER
No new care gaps identified.  Powered by [employer]. Reference number: [id number].   3/15/2022 12:05:38 AM CDT

## 2022-03-18 RX ORDER — AMLODIPINE BESYLATE 5 MG/1
TABLET ORAL
Qty: 90 TABLET | Refills: 1 | Status: SHIPPED | OUTPATIENT
Start: 2022-03-18 | End: 2022-08-30 | Stop reason: SDUPTHER

## 2022-03-18 NOTE — TELEPHONE ENCOUNTER
Refill Authorization Note   Jessenia Da Silva  is requesting a refill authorization.  Brief Assessment and Rationale for Refill:  Approve     Medication Therapy Plan:       Medication Reconciliation Completed: No   Comments:   --->Care Gap information included below if applicable.   Orders Placed This Encounter    amLODIPine (NORVASC) 5 MG tablet      Requested Prescriptions   Signed Prescriptions Disp Refills    amLODIPine (NORVASC) 5 MG tablet 90 tablet 1     Sig: TAKE 1 TABLET BY MOUTH EVERY DAY       Cardiovascular:  Calcium Channel Blockers Passed - 3/18/2022  5:49 PM        Passed - Patient is at least 18 years old        Passed - Last BP in normal range within 360 days     BP Readings from Last 3 Encounters:   08/16/21 110/60   09/24/20 123/81   08/11/20 117/65               Passed - Valid encounter within last 15 months     Recent Visits  Date Type Provider Dept   08/16/21 Office Visit Indigo Schaefer MD [facility]   08/11/20 Office Visit Indigo Schaefer MD [facility]   Showing recent visits within past 720 days and meeting all other requirements  Future Appointments  No visits were found meeting these conditions.  Showing future appointments within next 150 days and meeting all other requirements                    Appointments  past 12m or future 3m with PCP    Date Provider   Last Visit   8/16/2021 Indigo Schaefer MD   Next Visit   Visit date not found Indigo Schaefer MD   ED visits in past 90 days: 0     Note composed:5:49 PM 03/18/2022

## 2022-04-27 ENCOUNTER — IMMUNIZATION (OUTPATIENT)
Dept: INTERNAL MEDICINE | Facility: CLINIC | Age: 82
End: 2022-04-27
Payer: MEDICARE

## 2022-04-27 DIAGNOSIS — Z23 NEED FOR VACCINATION: Primary | ICD-10-CM

## 2022-04-27 PROCEDURE — 91305 COVID-19, MRNA, LNP-S, PF, 30 MCG/0.3 ML DOSE VACCINE (PFIZER): CPT | Mod: PBBFAC | Performed by: INTERNAL MEDICINE

## 2022-05-23 ENCOUNTER — OFFICE VISIT (OUTPATIENT)
Dept: PRIMARY CARE CLINIC | Facility: CLINIC | Age: 82
End: 2022-05-23
Payer: MEDICARE

## 2022-05-23 VITALS
DIASTOLIC BLOOD PRESSURE: 58 MMHG | BODY MASS INDEX: 19.73 KG/M2 | TEMPERATURE: 99 F | OXYGEN SATURATION: 97 % | SYSTOLIC BLOOD PRESSURE: 110 MMHG | HEART RATE: 79 BPM | WEIGHT: 125.69 LBS | RESPIRATION RATE: 18 BRPM | HEIGHT: 67 IN

## 2022-05-23 DIAGNOSIS — H90.A11 CONDUCTIVE HEARING LOSS OF RIGHT EAR WITH RESTRICTED HEARING OF LEFT EAR: Primary | ICD-10-CM

## 2022-05-23 PROCEDURE — 99213 OFFICE O/P EST LOW 20 MIN: CPT | Mod: S$GLB,,, | Performed by: NURSE PRACTITIONER

## 2022-05-23 PROCEDURE — 1160F RVW MEDS BY RX/DR IN RCRD: CPT | Mod: CPTII,S$GLB,, | Performed by: NURSE PRACTITIONER

## 2022-05-23 PROCEDURE — 3288F PR FALLS RISK ASSESSMENT DOCUMENTED: ICD-10-PCS | Mod: CPTII,S$GLB,, | Performed by: NURSE PRACTITIONER

## 2022-05-23 PROCEDURE — 1159F PR MEDICATION LIST DOCUMENTED IN MEDICAL RECORD: ICD-10-PCS | Mod: CPTII,S$GLB,, | Performed by: NURSE PRACTITIONER

## 2022-05-23 PROCEDURE — 3288F FALL RISK ASSESSMENT DOCD: CPT | Mod: CPTII,S$GLB,, | Performed by: NURSE PRACTITIONER

## 2022-05-23 PROCEDURE — 99213 PR OFFICE/OUTPT VISIT, EST, LEVL III, 20-29 MIN: ICD-10-PCS | Mod: S$GLB,,, | Performed by: NURSE PRACTITIONER

## 2022-05-23 PROCEDURE — 1101F PT FALLS ASSESS-DOCD LE1/YR: CPT | Mod: CPTII,S$GLB,, | Performed by: NURSE PRACTITIONER

## 2022-05-23 PROCEDURE — 1160F PR REVIEW ALL MEDS BY PRESCRIBER/CLIN PHARMACIST DOCUMENTED: ICD-10-PCS | Mod: CPTII,S$GLB,, | Performed by: NURSE PRACTITIONER

## 2022-05-23 PROCEDURE — 1101F PR PT FALLS ASSESS DOC 0-1 FALLS W/OUT INJ PAST YR: ICD-10-PCS | Mod: CPTII,S$GLB,, | Performed by: NURSE PRACTITIONER

## 2022-05-23 PROCEDURE — 99999 PR PBB SHADOW E&M-EST. PATIENT-LVL IV: ICD-10-PCS | Mod: PBBFAC,,, | Performed by: NURSE PRACTITIONER

## 2022-05-23 PROCEDURE — 1159F MED LIST DOCD IN RCRD: CPT | Mod: CPTII,S$GLB,, | Performed by: NURSE PRACTITIONER

## 2022-05-23 PROCEDURE — 99999 PR PBB SHADOW E&M-EST. PATIENT-LVL IV: CPT | Mod: PBBFAC,,, | Performed by: NURSE PRACTITIONER

## 2022-05-23 NOTE — PROGRESS NOTES
"[facility] Note    Chief Complaint      Chief Complaint   Patient presents with    Leg Injury   Report of bruise to thigh. Does not know why she is bruising so easily.    History of Present Illness      Jessenia Da Silva is a 82 y.o. female who presents today for reports of easy bruising to left lower extremity. She denies any trauma, accident to area. She is confused as to why she bruises so easily. Patient has a medical history of hypertension, bilateral carotid artery stenosis, osteopenia. She denies any SOB, chest pain, arthralgias, myalgias, N/V, constipation, diarrhea, fatigue.  She lost her  "a while back" but is coming to realize just how much he did for her and with him.  She continues to walk daily. She states she has a "picky appetite" and doesn't like to cook for herself, having so many left overs and getting tired of eating them.  Otherwise, patient is feeling well today.  We discussed why she is on plavix and amlodipine with relation for her bilateral carotid artery stenosis. Patient verbalizes understanding and is committed to continuing taking plavix and amlodipine.    Patient verbalizes a loss of hearing over time. She would like to be assessed to see if she needs a hearing aid.  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    HENT: Positive for hearing loss.         + hearing loss in right hear   Eyes: Negative.    Respiratory: Negative.    Cardiovascular: Negative.    Gastrointestinal: Negative.    Genitourinary: Negative.    Musculoskeletal: Negative.    Skin: Negative.    Neurological: Negative.    Endo/Heme/Allergies: Bruises/bleeds easily.        + bruise to LLE   Psychiatric/Behavioral: " Negative.         Past Medical History:  Past Medical History:   Diagnosis Date    Bilateral carotid artery disease     Cervical spondylosis     Hypertension     Osteopenia     TIA (transient ischemic attack)        Past Surgical History:  Past Surgical History:   Procedure Laterality Date    HYSTERECTOMY      TONSILLECTOMY         Family History:  family history includes Breast cancer in her sister; Dementia in her mother; Hypertension in her father; Pancreatic cancer in her sister.   Family history was reviewed with patient.    Social History:  Social History     Socioeconomic History    Marital status:     Number of children: 1   Tobacco Use    Smoking status: Former Smoker    Smokeless tobacco: Never Used   Substance and Sexual Activity    Alcohol use: Yes     Comment: socially    Drug use: No    Sexual activity: Not Currently   Social History Narrative    , lives alone, drives. 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          Medications:  Outpatient Encounter Medications as of 5/23/2022   Medication Sig Dispense Refill    amLODIPine (NORVASC) 5 MG tablet TAKE 1 TABLET BY MOUTH EVERY DAY 90 tablet 1    clopidogreL (PLAVIX) 75 mg tablet TAKE 1 TABLET BY MOUTH EVERY DAY 90 tablet 2    fluticasone propionate (FLONASE) 50 mcg/actuation nasal spray 2 sprays (100 mcg total) by Each Nostril route once daily. 16 g 5    pravastatin (PRAVACHOL) 10 MG tablet TAKE 1 TABLET BY MOUTH EVERY DAY 90 tablet 3    ergocalciferol (ERGOCALCIFEROL) 50,000 unit Cap TAKE 1 CAPSULE BY MOUTH ONE TIME PER WEEK (Patient not taking: Reported on 5/23/2022) 12 capsule 3    levocetirizine (XYZAL) 5 MG tablet Take 1 tablet (5 mg total) by mouth daily as needed for Allergies. (Patient not taking: Reported on 5/23/2022) 90 tablet 0     No facility-administered encounter medications on file as of 5/23/2022.       Allergies:  Review of patient's allergies  "indicates:  No Known Allergies    Health Maintenance:  Health Maintenance   Topic Date Due    Aspirin/Antiplatelet Therapy  05/23/2023    DEXA Scan  08/20/2024    Lipid Panel  08/18/2026    TETANUS VACCINE  10/01/2029     Health Maintenance Topics with due status: Not Due       Topic Last Completion Date    TETANUS VACCINE 10/01/2019    Lipid Panel 08/18/2021    DEXA Scan 08/20/2021    Aspirin/Antiplatelet Therapy 05/23/2022       Physical Exam      Vital Signs  Temp: 98.9 °F (37.2 °C)  Pulse: 79  Resp: 18  SpO2: 97 %  BP: (!) 110/58  Height and Weight  Height: 5' 7" (170.2 cm)  Weight: 57 kg (125 lb 10.6 oz)  BSA (Calculated - sq m): 1.64 sq meters  BMI (Calculated): 19.7  Weight in (lb) to have BMI = 25: 159.3]    Physical Exam  Vitals and nursing note reviewed.   Constitutional:       Appearance: Normal appearance. She is normal weight.   HENT:      Head: Normocephalic and atraumatic.      Right Ear: Tympanic membrane, ear canal and external ear normal.      Left Ear: Tympanic membrane, ear canal and external ear normal.      Nose: Nose normal.      Mouth/Throat:      Mouth: Mucous membranes are dry.      Pharynx: Oropharynx is clear.   Eyes:      Extraocular Movements: Extraocular movements intact.      Conjunctiva/sclera: Conjunctivae normal.      Pupils: Pupils are equal, round, and reactive to light.   Cardiovascular:      Rate and Rhythm: Normal rate and regular rhythm.      Pulses: Normal pulses.      Heart sounds: Normal heart sounds.   Pulmonary:      Effort: Pulmonary effort is normal.      Breath sounds: Normal breath sounds.   Abdominal:      General: Abdomen is flat. Bowel sounds are normal.      Palpations: Abdomen is soft.   Musculoskeletal:         General: Normal range of motion.      Cervical back: Normal range of motion.   Skin:     General: Skin is warm.      Capillary Refill: Capillary refill takes less than 2 seconds.   Neurological:      General: No focal deficit present.      Mental " Status: She is alert and oriented to person, place, and time.   Psychiatric:         Mood and Affect: Mood normal.         Behavior: Behavior normal.         Thought Content: Thought content normal.         Judgment: Judgment normal.          Laboratory:  CBC:  Recent Labs   Lab 08/11/20  1047 08/18/21  0952   WBC 5.60 6.02   RBC 4.30 4.15   Hemoglobin 13.1 13.0   Hematocrit 41.6 38.7   Platelets 249 250   MCV 97 93   MCH 30.5 31.3 H   MCHC 31.5 L 33.6     CMP:  Recent Labs   Lab 08/11/20  1047 08/18/21  0952   Glucose 88 82   Calcium 9.5 9.4   Albumin 3.9 3.6   Total Protein 7.0 6.4   Sodium 137 138   Potassium 3.5 3.3 L   CO2 33 H 30 H   Chloride 97 97   BUN 5 L 6 L   Alkaline Phosphatase 73 68   ALT 11 9 L   AST 18 15   Total Bilirubin 1.3 H 1.5 H     URINALYSIS:       LIPIDS:  Recent Labs   Lab 08/11/20  1047 08/18/21  0952   HDL 93 H 95 H   Cholesterol 173 162   Triglycerides 63 65   LDL Cholesterol 67.4 54.0 L   HDL/Cholesterol Ratio 53.8 H 58.6 H   Non-HDL Cholesterol 80 67   Total Cholesterol/HDL Ratio 1.9 L 1.7 L     TSH:      A1C:        Radiology:        Assessment/Plan     Jessenia Da Silva is a 82 y.o.female with:    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  Return to clinic as needed. Follow up with Dr. Schaefer for wellness exam in August.    I spent 20 minutes on the day of this encounter for preparing, evaluating, treating, examining, and discussing plan of care with this patient.  Greater than 50% of this time was spent face to face with patient. All questions answered to patient's satisfaction.        Karen L Spencer, NP-C [facility]

## 2022-06-02 DIAGNOSIS — H90.A11 CONDUCTIVE HEARING LOSS OF RIGHT EAR WITH RESTRICTED HEARING OF LEFT EAR: Primary | ICD-10-CM

## 2022-08-30 ENCOUNTER — LAB VISIT (OUTPATIENT)
Dept: LAB | Facility: HOSPITAL | Age: 82
End: 2022-08-30
Attending: INTERNAL MEDICINE
Payer: MEDICARE

## 2022-08-30 ENCOUNTER — OFFICE VISIT (OUTPATIENT)
Dept: PRIMARY CARE CLINIC | Facility: CLINIC | Age: 82
End: 2022-08-30
Payer: MEDICARE

## 2022-08-30 VITALS
HEIGHT: 67 IN | WEIGHT: 123.5 LBS | TEMPERATURE: 99 F | BODY MASS INDEX: 19.38 KG/M2 | OXYGEN SATURATION: 99 % | HEART RATE: 89 BPM | DIASTOLIC BLOOD PRESSURE: 60 MMHG | SYSTOLIC BLOOD PRESSURE: 110 MMHG

## 2022-08-30 DIAGNOSIS — F17.200 TOBACCO USE DISORDER: ICD-10-CM

## 2022-08-30 DIAGNOSIS — E55.9 VITAMIN D DEFICIENCY: ICD-10-CM

## 2022-08-30 DIAGNOSIS — Z86.73 OLD LACUNAR STROKE WITHOUT LATE EFFECT: ICD-10-CM

## 2022-08-30 DIAGNOSIS — I11.9 HYPERTENSIVE HEART DISEASE WITHOUT HEART FAILURE: ICD-10-CM

## 2022-08-30 DIAGNOSIS — M85.80 OSTEOPENIA, UNSPECIFIED LOCATION: ICD-10-CM

## 2022-08-30 DIAGNOSIS — J44.9 CHRONIC OBSTRUCTIVE PULMONARY DISEASE, UNSPECIFIED COPD TYPE: ICD-10-CM

## 2022-08-30 DIAGNOSIS — I65.23 BILATERAL CAROTID ARTERY STENOSIS: ICD-10-CM

## 2022-08-30 DIAGNOSIS — I11.9 HYPERTENSIVE HEART DISEASE WITHOUT HEART FAILURE: Primary | ICD-10-CM

## 2022-08-30 DIAGNOSIS — I70.0 THORACIC AORTA ATHEROSCLEROSIS: ICD-10-CM

## 2022-08-30 LAB
ALBUMIN SERPL BCP-MCNC: 3.8 G/DL (ref 3.5–5.2)
ALP SERPL-CCNC: 78 U/L (ref 55–135)
ALT SERPL W/O P-5'-P-CCNC: 9 U/L (ref 10–44)
ANION GAP SERPL CALC-SCNC: 9 MMOL/L (ref 8–16)
AST SERPL-CCNC: 16 U/L (ref 10–40)
BILIRUB SERPL-MCNC: 1 MG/DL (ref 0.1–1)
BUN SERPL-MCNC: 8 MG/DL (ref 8–23)
CALCIUM SERPL-MCNC: 9.5 MG/DL (ref 8.7–10.5)
CHLORIDE SERPL-SCNC: 96 MMOL/L (ref 95–110)
CHOLEST SERPL-MCNC: 167 MG/DL (ref 120–199)
CHOLEST/HDLC SERPL: 1.7 {RATIO} (ref 2–5)
CO2 SERPL-SCNC: 32 MMOL/L (ref 23–29)
CREAT SERPL-MCNC: 0.7 MG/DL (ref 0.5–1.4)
ERYTHROCYTE [DISTWIDTH] IN BLOOD BY AUTOMATED COUNT: 13.7 % (ref 11.5–14.5)
EST. GFR  (NO RACE VARIABLE): >60 ML/MIN/1.73 M^2
GLUCOSE SERPL-MCNC: 70 MG/DL (ref 70–110)
HCT VFR BLD AUTO: 38.9 % (ref 37–48.5)
HDLC SERPL-MCNC: 96 MG/DL (ref 40–75)
HDLC SERPL: 57.5 % (ref 20–50)
HGB BLD-MCNC: 13.5 G/DL (ref 12–16)
LDLC SERPL CALC-MCNC: 58.6 MG/DL (ref 63–159)
MCH RBC QN AUTO: 32.4 PG (ref 27–31)
MCHC RBC AUTO-ENTMCNC: 34.7 G/DL (ref 32–36)
MCV RBC AUTO: 93 FL (ref 82–98)
NONHDLC SERPL-MCNC: 71 MG/DL
PLATELET # BLD AUTO: 232 K/UL (ref 150–450)
PMV BLD AUTO: 9.4 FL (ref 9.2–12.9)
POTASSIUM SERPL-SCNC: 3.7 MMOL/L (ref 3.5–5.1)
PROT SERPL-MCNC: 6.5 G/DL (ref 6–8.4)
RBC # BLD AUTO: 4.17 M/UL (ref 4–5.4)
SODIUM SERPL-SCNC: 137 MMOL/L (ref 136–145)
TRIGL SERPL-MCNC: 62 MG/DL (ref 30–150)
WBC # BLD AUTO: 5.52 K/UL (ref 3.9–12.7)

## 2022-08-30 PROCEDURE — 1126F PR PAIN SEVERITY QUANTIFIED, NO PAIN PRESENT: ICD-10-PCS | Mod: CPTII,S$GLB,, | Performed by: INTERNAL MEDICINE

## 2022-08-30 PROCEDURE — 1160F RVW MEDS BY RX/DR IN RCRD: CPT | Mod: CPTII,S$GLB,, | Performed by: INTERNAL MEDICINE

## 2022-08-30 PROCEDURE — 3074F SYST BP LT 130 MM HG: CPT | Mod: CPTII,S$GLB,, | Performed by: INTERNAL MEDICINE

## 2022-08-30 PROCEDURE — 3078F DIAST BP <80 MM HG: CPT | Mod: CPTII,S$GLB,, | Performed by: INTERNAL MEDICINE

## 2022-08-30 PROCEDURE — 3074F PR MOST RECENT SYSTOLIC BLOOD PRESSURE < 130 MM HG: ICD-10-PCS | Mod: CPTII,S$GLB,, | Performed by: INTERNAL MEDICINE

## 2022-08-30 PROCEDURE — 99999 PR PBB SHADOW E&M-EST. PATIENT-LVL III: ICD-10-PCS | Mod: PBBFAC,,, | Performed by: INTERNAL MEDICINE

## 2022-08-30 PROCEDURE — 80053 COMPREHEN METABOLIC PANEL: CPT | Performed by: INTERNAL MEDICINE

## 2022-08-30 PROCEDURE — 85027 COMPLETE CBC AUTOMATED: CPT | Performed by: INTERNAL MEDICINE

## 2022-08-30 PROCEDURE — 3078F PR MOST RECENT DIASTOLIC BLOOD PRESSURE < 80 MM HG: ICD-10-PCS | Mod: CPTII,S$GLB,, | Performed by: INTERNAL MEDICINE

## 2022-08-30 PROCEDURE — 1160F PR REVIEW ALL MEDS BY PRESCRIBER/CLIN PHARMACIST DOCUMENTED: ICD-10-PCS | Mod: CPTII,S$GLB,, | Performed by: INTERNAL MEDICINE

## 2022-08-30 PROCEDURE — 1126F AMNT PAIN NOTED NONE PRSNT: CPT | Mod: CPTII,S$GLB,, | Performed by: INTERNAL MEDICINE

## 2022-08-30 PROCEDURE — 1159F PR MEDICATION LIST DOCUMENTED IN MEDICAL RECORD: ICD-10-PCS | Mod: CPTII,S$GLB,, | Performed by: INTERNAL MEDICINE

## 2022-08-30 PROCEDURE — 80061 LIPID PANEL: CPT | Performed by: INTERNAL MEDICINE

## 2022-08-30 PROCEDURE — 1159F MED LIST DOCD IN RCRD: CPT | Mod: CPTII,S$GLB,, | Performed by: INTERNAL MEDICINE

## 2022-08-30 PROCEDURE — 99214 PR OFFICE/OUTPT VISIT, EST, LEVL IV, 30-39 MIN: ICD-10-PCS | Mod: S$GLB,,, | Performed by: INTERNAL MEDICINE

## 2022-08-30 PROCEDURE — 36415 COLL VENOUS BLD VENIPUNCTURE: CPT | Mod: PN | Performed by: INTERNAL MEDICINE

## 2022-08-30 PROCEDURE — 99214 OFFICE O/P EST MOD 30 MIN: CPT | Mod: S$GLB,,, | Performed by: INTERNAL MEDICINE

## 2022-08-30 PROCEDURE — 99999 PR PBB SHADOW E&M-EST. PATIENT-LVL III: CPT | Mod: PBBFAC,,, | Performed by: INTERNAL MEDICINE

## 2022-08-30 RX ORDER — PRAVASTATIN SODIUM 10 MG/1
10 TABLET ORAL DAILY
Qty: 90 TABLET | Refills: 3 | Status: SHIPPED | OUTPATIENT
Start: 2022-08-30 | End: 2023-05-26 | Stop reason: SDUPTHER

## 2022-08-30 RX ORDER — ERGOCALCIFEROL 1.25 MG/1
50000 CAPSULE ORAL
Qty: 12 CAPSULE | Refills: 3 | Status: SHIPPED | OUTPATIENT
Start: 2022-08-30 | End: 2023-05-26 | Stop reason: SDUPTHER

## 2022-08-30 RX ORDER — AMLODIPINE BESYLATE 5 MG/1
5 TABLET ORAL DAILY
Qty: 90 TABLET | Refills: 3 | Status: SHIPPED | OUTPATIENT
Start: 2022-08-30 | End: 2023-05-26 | Stop reason: SDUPTHER

## 2022-08-30 RX ORDER — CLOPIDOGREL BISULFATE 75 MG/1
75 TABLET ORAL DAILY
Qty: 90 TABLET | Refills: 3 | Status: SHIPPED | OUTPATIENT
Start: 2022-08-30 | End: 2023-05-26 | Stop reason: SDUPTHER

## 2022-08-30 NOTE — PROGRESS NOTES
Subjective:       Patient ID: Jessenia Da Silva is a 82 y.o. female.    Chief Complaint: Annual Exam    Last seen one year ago. Only a couple of urgent care visits for minor problems since then. Presents for annual physical and f/u chronic medical conditions. No acute complaints. Attributes weight loss to simply eating less, does not have much of an appetite since losing her , and her closest sister  earlier this year.      PMH:   Hypertension with concentric remodeling on echo , normal LV function.   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   Cervical spondylosis.  Mild Bilateral Carotid Artery Disease - minimal on ultrasound in Cardiology .  Osteopenia.   COPD, mild on PFT's .  Hyperbilirubinemia, mild, chronic.     PSH: Tonsillectomy. Hysterectomy and BSO. Lasik right eye. Bilateral Cataract extraction.    Mammogram normal  - declines this year. BMD stable . EKG normal 3/18. CXR clear . Colonoscopy normal >10 yrs ago. Eye exam? Prevnar 3/19. Tdap 10/19. Pneumovax . Flu shot 10/21. Shingrix 10/21, . COVID (Pf[employer]) x 4.    Social: Social some day smoker still. Occasional alcohol. , adult son lives locally - he is a heart and kidney transplant recipient. Retired consumer credit counselor.     FMH: HTN in father, dementia in mother, pancreatic cancer in sister.     NKDA.     Medications: Amlodipine 5mg daily, Pravastatin 10mg daily, Plavix 75mg daily, Flonase prn, Vitamin D 50,000 weekly.     Review of Systems   Constitutional:  Negative for activity change, appetite change, fatigue, fever and unexpected weight change.   HENT:  Positive for hearing loss and sneezing. Negative for nasal congestion, ear pain, rhinorrhea, sore throat, trouble swallowing and voice change.    Eyes:  Negative for pain and visual disturbance.   Respiratory:  Negative for cough, chest tightness, shortness of  "breath and wheezing.    Cardiovascular:  Negative for chest pain, palpitations and leg swelling.   Gastrointestinal:  Negative for abdominal pain, blood in stool, constipation, diarrhea, nausea and vomiting.   Genitourinary:  Negative for dysuria, frequency, pelvic pain and vaginal bleeding.   Musculoskeletal:  Negative for arthralgias, gait problem, joint swelling and myalgias.   Integumentary:  Negative for color change and rash.   Neurological:  Negative for dizziness, syncope, facial asymmetry, speech difficulty, weakness, numbness and headaches.   Hematological:  Negative for adenopathy. Does not bruise/bleed easily.   Psychiatric/Behavioral:  Negative for confusion, dysphoric mood and sleep disturbance. The patient is not nervous/anxious.        Objective:    /60, Pulse 89, Temp 99, O2 Sat 99%, Ht 5' 7", Wt 123.5 lbs (from 131 a year ago)  Physical Exam  Vitals reviewed.   Constitutional:       General: She is not in acute distress.     Appearance: She is well-developed. She is not ill-appearing or diaphoretic.      Comments: Well groomed, ambulatory with normal gait, here alone (drove herself).   HENT:      Head: Normocephalic and atraumatic.      Right Ear: There is impacted cerumen.      Left Ear: There is impacted cerumen.      Nose: Nose normal. No congestion.      Mouth/Throat:      Mouth: Mucous membranes are moist.      Pharynx: Oropharynx is clear.   Eyes:      General: No scleral icterus.     Extraocular Movements: Extraocular movements intact.      Conjunctiva/sclera: Conjunctivae normal.      Right eye: Right conjunctiva is not injected.      Left eye: Left conjunctiva is not injected.   Neck:      Thyroid: No thyromegaly.      Vascular: No carotid bruit or JVD.   Cardiovascular:      Rate and Rhythm: Normal rate and regular rhythm.      Pulses: Normal pulses.      Heart sounds: Normal heart sounds. No murmur heard.    No friction rub. No gallop.   Pulmonary:      Effort: Pulmonary effort is " normal. No respiratory distress.      Breath sounds: Normal breath sounds. No wheezing, rhonchi or rales.   Abdominal:      General: Bowel sounds are normal. There is no distension.      Palpations: Abdomen is soft. There is no mass.      Tenderness: There is no abdominal tenderness.   Musculoskeletal:         General: No tenderness or deformity. Normal range of motion.      Cervical back: Normal range of motion and neck supple.      Right lower leg: No edema.      Left lower leg: No edema.   Lymphadenopathy:      Cervical: No cervical adenopathy.   Skin:     General: Skin is warm and dry.      Coloration: Skin is not pale.      Findings: No erythema or rash.      Nails: There is no clubbing.   Neurological:      General: No focal deficit present.      Mental Status: She is alert and oriented to person, place, and time.      Cranial Nerves: No cranial nerve deficit.      Motor: No weakness, atrophy or abnormal muscle tone.      Coordination: Coordination normal.      Gait: Gait normal.   Psychiatric:         Mood and Affect: Mood normal.         Speech: Speech normal.         Behavior: Behavior normal.         Thought Content: Thought content normal.         Judgment: Judgment normal.       Assessment:       Problem List Items Addressed This Visit       Hypertensive heart disease without heart failure - Primary    Relevant Medications    amLODIPine (NORVASC) 5 MG tablet    Other Relevant Orders    CBC Without Differential    Comprehensive Metabolic Panel    Lipid Panel    Old lacunar stroke without late effect    Relevant Medications    clopidogreL (PLAVIX) 75 mg tablet    Vitamin D deficiency    Relevant Medications    ergocalciferol (ERGOCALCIFEROL) 50,000 unit Cap    Osteopenia    Bilateral carotid artery stenosis    Relevant Medications    pravastatin (PRAVACHOL) 10 MG tablet    Tobacco use disorder    Chronic obstructive pulmonary disease    Thoracic aorta atherosclerosis    Relevant Medications    pravastatin  (PRAVACHOL) 10 MG tablet         Plan:       Hypertensive heart disease without heart failure - controlled.  -     CBC Without Differential; Future; Expected date: 08/30/2022  -     Comprehensive Metabolic Panel; Future; Expected date: 08/30/2022  -     Lipid Panel; Future; Expected date: 08/30/2022  -     amLODIPine (NORVASC) 5 MG tablet; Take 1 tablet (5 mg total) by mouth once daily.  Dispense: 90 tablet; Refill: 3    Bilateral carotid artery stenosis  -     pravastatin (PRAVACHOL) 10 MG tablet; Take 1 tablet (10 mg total) by mouth once daily.  Dispense: 90 tablet; Refill: 3    Thoracic aorta atherosclerosis  -     pravastatin (PRAVACHOL) 10 MG tablet; Take 1 tablet (10 mg total) by mouth once daily.  Dispense: 90 tablet; Refill: 3    Old lacunar stroke without late effect - no recurrence.   -     clopidogreL (PLAVIX) 75 mg tablet; Take 1 tablet (75 mg total) by mouth once daily.  Dispense: 90 tablet; Refill: 3    Chronic obstructive pulmonary disease, unspecified COPD type        -     asymptomatic, she declines inhalers.     Tobacco use disorder        -     she managed to quit for Lent, encouraged complete smoking cessation.     Osteopenia, unspecified location         -     stop smoking.     Vitamin D deficiency  -     ergocalciferol (ERGOCALCIFEROL) 50,000 unit Cap; Take 1 capsule (50,000 Units total) by mouth every 7 days.  Dispense: 12 capsule; Refill: 3

## 2022-09-08 ENCOUNTER — TELEPHONE (OUTPATIENT)
Dept: PRIMARY CARE CLINIC | Facility: CLINIC | Age: 82
End: 2022-09-08
Payer: MEDICARE

## 2022-09-08 NOTE — TELEPHONE ENCOUNTER
----- Message from Josesito Montalvo sent at 9/8/2022  2:43 PM CDT -----  Contact: pt [phone]  Patient states  would like to know if she can take Flonase and Mucinex together? Please call and advise.    Thank you and have a great day.

## 2022-09-22 ENCOUNTER — IMMUNIZATION (OUTPATIENT)
Dept: INTERNAL MEDICINE | Facility: CLINIC | Age: 82
End: 2022-09-22
Payer: MEDICARE

## 2022-09-22 DIAGNOSIS — Z23 NEED FOR VACCINATION: Primary | ICD-10-CM

## 2022-09-22 PROCEDURE — 91312 COVID-19, MRNA, LNP-S, BIVALENT BOOSTER, PF, 30 MCG/0.3 ML DOSE: CPT | Mod: S$GLB,,, | Performed by: INTERNAL MEDICINE

## 2022-09-22 PROCEDURE — 0124A COVID-19, MRNA, LNP-S, BIVALENT BOOSTER, PF, 30 MCG/0.3 ML DOSE: CPT | Mod: PBBFAC | Performed by: INTERNAL MEDICINE

## 2022-09-22 PROCEDURE — 91312 COVID-19, MRNA, LNP-S, BIVALENT BOOSTER, PF, 30 MCG/0.3 ML DOSE: ICD-10-PCS | Mod: S$GLB,,, | Performed by: INTERNAL MEDICINE

## 2022-12-08 ENCOUNTER — NURSE TRIAGE (OUTPATIENT)
Dept: ADMINISTRATIVE | Facility: CLINIC | Age: 82
End: 2022-12-08
Payer: MEDICARE

## 2022-12-08 NOTE — TELEPHONE ENCOUNTER
Jessenia calling c/o intermittent dizziness x several days. States dizziness worse with movement & getting up too fast. Unsure if it is medication related or not. States she takes amlodipine but does not check BP daily. Unable to schedule appt with PCP during recommenced time frame. Advised per triage protocol to go to nearest UC/ED now for physician eval. V/u.   Reason for Disposition   Lightheadedness (dizziness) present now, after 2 hours of rest and fluids    Additional Information   Negative: SEVERE difficulty breathing (e.g., struggling for each breath, speaks in single words)   Negative: Shock suspected (e.g., cold/pale/clammy skin, too weak to stand, low BP, rapid pulse)   Negative: Difficult to awaken or acting confused (e.g., disoriented, slurred speech)   Negative: Fainted, and still feels dizzy afterwards   Negative: Overdose (accidental or intentional) of medications   Negative: New neurologic deficit that is present now: * Weakness of the face, arm, or leg on one side of the body * Numbness of the face, arm, or leg on one side of the body * Loss of speech or garbled speech   Negative: Heart beating < 50 beats per minute OR > 140 beats per minute   Negative: Sounds like a life-threatening emergency to the triager   Negative: Chest pain   Negative: SEVERE dizziness (e.g., unable to stand, requires support to walk, feels like passing out now)   Negative: SEVERE headache or neck pain   Negative: Spinning or tilting sensation (vertigo) present now and one or more stroke risk factors (i.e., hypertension, diabetes mellitus, prior stroke/TIA, heart attack, age over 60) (Exception: prior physician evaluation for this AND no different/worse than usual)   Negative: Neurologic deficit that was brief (now gone), ANY of the following:* Weakness of the face, arm, or leg on one side of the body* Numbness of the face, arm, or leg on one side of the body* Loss of speech or garbled speech   Negative: Loss of vision or  double vision  (Exception: Similar to previous migraines.)   Negative: Extra heart beats OR irregular heart beating (i.e., 'palpitations')   Negative: Difficulty breathing   Negative: Drinking very little and has signs of dehydration (e.g., no urine > 12 hours, very dry mouth, very lightheaded)   Negative: Follows bleeding (e.g., stomach, rectum, vagina)  (Exception: Became dizzy from sight of small amount blood.)   Negative: Patient sounds very sick or weak to the triager    Protocols used: Dizziness-A-OH

## 2023-05-26 ENCOUNTER — OFFICE VISIT (OUTPATIENT)
Dept: PRIMARY CARE CLINIC | Facility: CLINIC | Age: 83
End: 2023-05-26
Payer: MEDICARE

## 2023-05-26 ENCOUNTER — LAB VISIT (OUTPATIENT)
Dept: LAB | Facility: HOSPITAL | Age: 83
End: 2023-05-26
Attending: INTERNAL MEDICINE
Payer: MEDICARE

## 2023-05-26 VITALS
HEIGHT: 67 IN | SYSTOLIC BLOOD PRESSURE: 118 MMHG | TEMPERATURE: 99 F | DIASTOLIC BLOOD PRESSURE: 60 MMHG | WEIGHT: 121.31 LBS | OXYGEN SATURATION: 99 % | HEART RATE: 83 BPM | BODY MASS INDEX: 19.04 KG/M2

## 2023-05-26 DIAGNOSIS — I70.0 THORACIC AORTA ATHEROSCLEROSIS: ICD-10-CM

## 2023-05-26 DIAGNOSIS — Z86.73 OLD LACUNAR STROKE WITHOUT LATE EFFECT: ICD-10-CM

## 2023-05-26 DIAGNOSIS — E55.9 VITAMIN D DEFICIENCY: ICD-10-CM

## 2023-05-26 DIAGNOSIS — M85.80 OSTEOPENIA, UNSPECIFIED LOCATION: ICD-10-CM

## 2023-05-26 DIAGNOSIS — I11.9 HYPERTENSIVE HEART DISEASE WITHOUT HEART FAILURE: Primary | ICD-10-CM

## 2023-05-26 DIAGNOSIS — Z87.891 FORMER TOBACCO USE: ICD-10-CM

## 2023-05-26 DIAGNOSIS — I65.23 BILATERAL CAROTID ARTERY STENOSIS: ICD-10-CM

## 2023-05-26 DIAGNOSIS — Z23 NEED FOR COVID-19 VACCINE: ICD-10-CM

## 2023-05-26 DIAGNOSIS — R63.4 WEIGHT LOSS: ICD-10-CM

## 2023-05-26 DIAGNOSIS — I11.9 HYPERTENSIVE HEART DISEASE WITHOUT HEART FAILURE: ICD-10-CM

## 2023-05-26 DIAGNOSIS — J44.9 CHRONIC OBSTRUCTIVE PULMONARY DISEASE, UNSPECIFIED COPD TYPE: ICD-10-CM

## 2023-05-26 LAB
25(OH)D3+25(OH)D2 SERPL-MCNC: 38 NG/ML (ref 30–96)
ALBUMIN SERPL BCP-MCNC: 3.8 G/DL (ref 3.5–5.2)
ALP SERPL-CCNC: 67 U/L (ref 55–135)
ALT SERPL W/O P-5'-P-CCNC: 10 U/L (ref 10–44)
ANION GAP SERPL CALC-SCNC: 11 MMOL/L (ref 8–16)
AST SERPL-CCNC: 17 U/L (ref 10–40)
BASOPHILS # BLD AUTO: 0.04 K/UL (ref 0–0.2)
BASOPHILS NFR BLD: 0.5 % (ref 0–1.9)
BILIRUB SERPL-MCNC: 1.2 MG/DL (ref 0.1–1)
BILIRUB UR QL STRIP: NEGATIVE
BUN SERPL-MCNC: 9 MG/DL (ref 8–23)
CALCIUM SERPL-MCNC: 9.6 MG/DL (ref 8.7–10.5)
CHLORIDE SERPL-SCNC: 96 MMOL/L (ref 95–110)
CLARITY UR REFRACT.AUTO: CLEAR
CO2 SERPL-SCNC: 29 MMOL/L (ref 23–29)
COLOR UR AUTO: YELLOW
CREAT SERPL-MCNC: 0.7 MG/DL (ref 0.5–1.4)
DIFFERENTIAL METHOD: ABNORMAL
EOSINOPHIL # BLD AUTO: 0.1 K/UL (ref 0–0.5)
EOSINOPHIL NFR BLD: 0.8 % (ref 0–8)
ERYTHROCYTE [DISTWIDTH] IN BLOOD BY AUTOMATED COUNT: 13.6 % (ref 11.5–14.5)
EST. GFR  (NO RACE VARIABLE): >60 ML/MIN/1.73 M^2
GLUCOSE SERPL-MCNC: 81 MG/DL (ref 70–110)
GLUCOSE UR QL STRIP: NEGATIVE
HCT VFR BLD AUTO: 39.1 % (ref 37–48.5)
HGB BLD-MCNC: 12.8 G/DL (ref 12–16)
HGB UR QL STRIP: NEGATIVE
IMM GRANULOCYTES # BLD AUTO: 0.01 K/UL (ref 0–0.04)
IMM GRANULOCYTES NFR BLD AUTO: 0.1 % (ref 0–0.5)
KETONES UR QL STRIP: NEGATIVE
LEUKOCYTE ESTERASE UR QL STRIP: ABNORMAL
LYMPHOCYTES # BLD AUTO: 1.7 K/UL (ref 1–4.8)
LYMPHOCYTES NFR BLD: 23.6 % (ref 18–48)
MCH RBC QN AUTO: 31.1 PG (ref 27–31)
MCHC RBC AUTO-ENTMCNC: 32.7 G/DL (ref 32–36)
MCV RBC AUTO: 95 FL (ref 82–98)
MICROSCOPIC COMMENT: ABNORMAL
MONOCYTES # BLD AUTO: 0.7 K/UL (ref 0.3–1)
MONOCYTES NFR BLD: 9.7 % (ref 4–15)
NEUTROPHILS # BLD AUTO: 4.8 K/UL (ref 1.8–7.7)
NEUTROPHILS NFR BLD: 65.3 % (ref 38–73)
NITRITE UR QL STRIP: NEGATIVE
NRBC BLD-RTO: 0 /100 WBC
PH UR STRIP: 7 [PH] (ref 5–8)
PLATELET # BLD AUTO: 259 K/UL (ref 150–450)
PMV BLD AUTO: 10 FL (ref 9.2–12.9)
POTASSIUM SERPL-SCNC: 3.8 MMOL/L (ref 3.5–5.1)
PROT SERPL-MCNC: 6.6 G/DL (ref 6–8.4)
PROT UR QL STRIP: NEGATIVE
RBC # BLD AUTO: 4.12 M/UL (ref 4–5.4)
RBC #/AREA URNS AUTO: 0 /HPF (ref 0–4)
SODIUM SERPL-SCNC: 136 MMOL/L (ref 136–145)
SP GR UR STRIP: 1.01 (ref 1–1.03)
TSH SERPL DL<=0.005 MIU/L-ACNC: 1.21 UIU/ML (ref 0.4–4)
URN SPEC COLLECT METH UR: ABNORMAL
WBC # BLD AUTO: 7.32 K/UL (ref 3.9–12.7)
WBC #/AREA URNS AUTO: 9 /HPF (ref 0–5)

## 2023-05-26 PROCEDURE — 3074F SYST BP LT 130 MM HG: CPT | Mod: CPTII,S$GLB,, | Performed by: INTERNAL MEDICINE

## 2023-05-26 PROCEDURE — 99214 OFFICE O/P EST MOD 30 MIN: CPT | Mod: S$GLB,,, | Performed by: INTERNAL MEDICINE

## 2023-05-26 PROCEDURE — 36415 COLL VENOUS BLD VENIPUNCTURE: CPT | Mod: PN | Performed by: INTERNAL MEDICINE

## 2023-05-26 PROCEDURE — 1160F RVW MEDS BY RX/DR IN RCRD: CPT | Mod: CPTII,S$GLB,, | Performed by: INTERNAL MEDICINE

## 2023-05-26 PROCEDURE — 99214 PR OFFICE/OUTPT VISIT, EST, LEVL IV, 30-39 MIN: ICD-10-PCS | Mod: S$GLB,,, | Performed by: INTERNAL MEDICINE

## 2023-05-26 PROCEDURE — 1101F PR PT FALLS ASSESS DOC 0-1 FALLS W/OUT INJ PAST YR: ICD-10-PCS | Mod: CPTII,S$GLB,, | Performed by: INTERNAL MEDICINE

## 2023-05-26 PROCEDURE — 1159F PR MEDICATION LIST DOCUMENTED IN MEDICAL RECORD: ICD-10-PCS | Mod: CPTII,S$GLB,, | Performed by: INTERNAL MEDICINE

## 2023-05-26 PROCEDURE — 84443 ASSAY THYROID STIM HORMONE: CPT | Performed by: INTERNAL MEDICINE

## 2023-05-26 PROCEDURE — 1101F PT FALLS ASSESS-DOCD LE1/YR: CPT | Mod: CPTII,S$GLB,, | Performed by: INTERNAL MEDICINE

## 2023-05-26 PROCEDURE — 1126F PR PAIN SEVERITY QUANTIFIED, NO PAIN PRESENT: ICD-10-PCS | Mod: CPTII,S$GLB,, | Performed by: INTERNAL MEDICINE

## 2023-05-26 PROCEDURE — 82306 VITAMIN D 25 HYDROXY: CPT | Performed by: INTERNAL MEDICINE

## 2023-05-26 PROCEDURE — 1159F MED LIST DOCD IN RCRD: CPT | Mod: CPTII,S$GLB,, | Performed by: INTERNAL MEDICINE

## 2023-05-26 PROCEDURE — 1160F PR REVIEW ALL MEDS BY PRESCRIBER/CLIN PHARMACIST DOCUMENTED: ICD-10-PCS | Mod: CPTII,S$GLB,, | Performed by: INTERNAL MEDICINE

## 2023-05-26 PROCEDURE — 99999 PR PBB SHADOW E&M-EST. PATIENT-LVL III: CPT | Mod: PBBFAC,,, | Performed by: INTERNAL MEDICINE

## 2023-05-26 PROCEDURE — 85025 COMPLETE CBC W/AUTO DIFF WBC: CPT | Performed by: INTERNAL MEDICINE

## 2023-05-26 PROCEDURE — 3078F DIAST BP <80 MM HG: CPT | Mod: CPTII,S$GLB,, | Performed by: INTERNAL MEDICINE

## 2023-05-26 PROCEDURE — 3074F PR MOST RECENT SYSTOLIC BLOOD PRESSURE < 130 MM HG: ICD-10-PCS | Mod: CPTII,S$GLB,, | Performed by: INTERNAL MEDICINE

## 2023-05-26 PROCEDURE — 80053 COMPREHEN METABOLIC PANEL: CPT | Performed by: INTERNAL MEDICINE

## 2023-05-26 PROCEDURE — 3078F PR MOST RECENT DIASTOLIC BLOOD PRESSURE < 80 MM HG: ICD-10-PCS | Mod: CPTII,S$GLB,, | Performed by: INTERNAL MEDICINE

## 2023-05-26 PROCEDURE — 81001 URINALYSIS AUTO W/SCOPE: CPT | Performed by: INTERNAL MEDICINE

## 2023-05-26 PROCEDURE — 99999 PR PBB SHADOW E&M-EST. PATIENT-LVL III: ICD-10-PCS | Mod: PBBFAC,,, | Performed by: INTERNAL MEDICINE

## 2023-05-26 PROCEDURE — 3288F FALL RISK ASSESSMENT DOCD: CPT | Mod: CPTII,S$GLB,, | Performed by: INTERNAL MEDICINE

## 2023-05-26 PROCEDURE — 1126F AMNT PAIN NOTED NONE PRSNT: CPT | Mod: CPTII,S$GLB,, | Performed by: INTERNAL MEDICINE

## 2023-05-26 PROCEDURE — 3288F PR FALLS RISK ASSESSMENT DOCUMENTED: ICD-10-PCS | Mod: CPTII,S$GLB,, | Performed by: INTERNAL MEDICINE

## 2023-05-26 RX ORDER — CLOPIDOGREL BISULFATE 75 MG/1
75 TABLET ORAL DAILY
Qty: 90 TABLET | Refills: 2 | Status: SHIPPED | OUTPATIENT
Start: 2023-05-26 | End: 2023-12-04 | Stop reason: SDUPTHER

## 2023-05-26 RX ORDER — PRAVASTATIN SODIUM 10 MG/1
10 TABLET ORAL DAILY
Qty: 90 TABLET | Refills: 2 | Status: SHIPPED | OUTPATIENT
Start: 2023-05-26 | End: 2023-12-04 | Stop reason: SDUPTHER

## 2023-05-26 RX ORDER — ERGOCALCIFEROL 1.25 MG/1
50000 CAPSULE ORAL
Qty: 12 CAPSULE | Refills: 2 | Status: SHIPPED | OUTPATIENT
Start: 2023-05-26 | End: 2023-12-04 | Stop reason: SDUPTHER

## 2023-05-26 RX ORDER — AMLODIPINE BESYLATE 5 MG/1
5 TABLET ORAL DAILY
Qty: 90 TABLET | Refills: 2 | Status: SHIPPED | OUTPATIENT
Start: 2023-05-26 | End: 2023-12-04 | Stop reason: SDUPTHER

## 2023-05-26 NOTE — PROGRESS NOTES
Subjective     Patient ID: Jessenia Da Silva is a 83 y.o. female.    Chief Complaint: Hypertension    Last seen 9 months ago. Returns for scheduled f/u chronic medical conditions. Taking daily meds as prescribed, no home BP monitoring, it is typically controlled. No acute complaints, feeling well.     PMH:   Hypertension with concentric remodeling on echo , normal LV function.   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   Cervical spondylosis.  Mild Bilateral Carotid Artery Disease - minimal on ultrasound in Cardiology .  Osteopenia.   COPD, mild on PFT's .  Hyperbilirubinemia, mild, chronic.   Hearing loss.    PSH: Tonsillectomy. Hysterectomy and BSO. Lasik right eye. Bilateral Cataract extraction with lens implants.    Mammogram normal  - declines repeat. BMD stable . EKG normal 3/18. CXR clear . Colonoscopy normal >10 yrs ago. Eye exam  Dr. Hill. Prevnar 3/19. Tdap 10/19. Pneumovax . Flu shot 10/21. Shingrix 10/21, . COVID ([employer]) x 5.    Social: QUIT SMOKING 23! Occasional alcohol. , adult son lives locally - he is a heart and kidney transplant recipient. Retired consumer credit counselor.     FMH: HTN in father, dementia in mother, pancreatic cancer in sister.     NKDA.     Medications: Amlodipine 5mg daily, Pravastatin 10mg daily, Plavix 75mg daily, Flonase prn, Vitamin D 50,000 weekly.     Review of Systems   Constitutional:  Negative for activity change, appetite change, fatigue, fever and unexpected weight change.   HENT:  Negative for nasal congestion, ear pain, hearing loss, rhinorrhea, sneezing, sore throat, trouble swallowing and voice change.    Eyes:  Negative for pain and visual disturbance.   Respiratory:  Negative for cough, chest tightness, shortness of breath and wheezing.    Cardiovascular:  Negative for chest pain, palpitations and leg swelling.  "  Gastrointestinal:  Negative for abdominal pain, blood in stool, constipation, diarrhea, nausea and vomiting.   Genitourinary:  Negative for dysuria, frequency, hematuria, pelvic pain, urgency and vaginal bleeding.   Musculoskeletal:  Negative for arthralgias, gait problem, joint swelling and myalgias.   Integumentary:  Negative for color change and rash.   Neurological:  Negative for dizziness, syncope, facial asymmetry, speech difficulty, weakness, numbness and headaches.   Hematological:  Negative for adenopathy. Does not bruise/bleed easily.   Psychiatric/Behavioral:  Negative for confusion, dysphoric mood and sleep disturbance. The patient is not nervous/anxious.         Objective   Vitals:    05/26/23 1043   BP: 118/60   Pulse: 83   Temp: 98.9 °F (37.2 °C)   SpO2: 99%   Weight: 55 kg (121 lb 4.8 oz)      From 123.5 nine months ago.   Height: 5' 7" (1.702 m)   BMI=19  Physical Exam  Vitals reviewed.   Constitutional:       General: She is not in acute distress.     Appearance: She is well-developed. She is not ill-appearing or diaphoretic.      Comments: Well groomed, ambulatory without aid, here alone.   HENT:      Head: Normocephalic and atraumatic.      Right Ear: There is impacted cerumen.      Left Ear: Tympanic membrane and ear canal normal.      Nose: Nose normal. No congestion.      Mouth/Throat:      Mouth: Mucous membranes are moist.      Pharynx: Oropharynx is clear.   Eyes:      General: No scleral icterus.     Extraocular Movements: Extraocular movements intact.      Conjunctiva/sclera: Conjunctivae normal.      Right eye: Right conjunctiva is not injected.      Left eye: Left conjunctiva is not injected.   Neck:      Thyroid: No thyromegaly.      Vascular: No carotid bruit or JVD.   Cardiovascular:      Rate and Rhythm: Normal rate and regular rhythm.      Pulses: Normal pulses.      Heart sounds: Normal heart sounds. No murmur heard.    No friction rub. No gallop.   Pulmonary:      Effort: " Pulmonary effort is normal. No respiratory distress.      Breath sounds: Normal breath sounds. No wheezing, rhonchi or rales.   Abdominal:      General: Bowel sounds are normal. There is no distension.      Palpations: Abdomen is soft. There is no mass.      Tenderness: There is no abdominal tenderness.   Musculoskeletal:         General: No tenderness or deformity. Normal range of motion.      Cervical back: Normal range of motion and neck supple.      Right lower leg: No edema.      Left lower leg: No edema.   Lymphadenopathy:      Cervical: No cervical adenopathy.   Skin:     General: Skin is warm and dry.      Coloration: Skin is not pale.      Findings: No erythema or rash.      Nails: There is no clubbing.   Neurological:      General: No focal deficit present.      Mental Status: She is alert and oriented to person, place, and time.      Cranial Nerves: No cranial nerve deficit.      Motor: No abnormal muscle tone.      Coordination: Coordination normal.      Gait: Gait normal.      Deep Tendon Reflexes: Reflexes are normal and symmetric.   Psychiatric:         Mood and Affect: Mood normal.         Behavior: Behavior normal.         Thought Content: Thought content normal.         Judgment: Judgment normal.     No visits with results within 3 Week(s) from this visit.   Latest known visit with results is:   Lab Visit on 08/30/2022   Component Date Value    WBC 08/30/2022 5.52     RBC 08/30/2022 4.17     Hemoglobin 08/30/2022 13.5     Hematocrit 08/30/2022 38.9     MCV 08/30/2022 93     MCH 08/30/2022 32.4 (H)     MCHC 08/30/2022 34.7     RDW 08/30/2022 13.7     Platelets 08/30/2022 232     MPV 08/30/2022 9.4     Sodium 08/30/2022 137     Potassium 08/30/2022 3.7     Chloride 08/30/2022 96     CO2 08/30/2022 32 (H)     Glucose 08/30/2022 70     BUN 08/30/2022 8     Creatinine 08/30/2022 0.7     Calcium 08/30/2022 9.5     Total Protein 08/30/2022 6.5     Albumin 08/30/2022 3.8     Total Bilirubin 08/30/2022 1.0      Alkaline Phosphatase 08/30/2022 78     AST 08/30/2022 16     ALT 08/30/2022 9 (L)     Anion Gap 08/30/2022 9     eGFR 08/30/2022 >60.0     Cholesterol 08/30/2022 167     Triglycerides 08/30/2022 62     HDL 08/30/2022 96 (H)     LDL Cholesterol 08/30/2022 58.6 (L)     HDL/Cholesterol Ratio 08/30/2022 57.5 (H)     Total Cholesterol/HDL Ra* 08/30/2022 1.7 (L)     Non-HDL Cholesterol 08/30/2022 71         Assessment and Plan       Hypertensive heart disease without heart failure - controlled, continue same.  -     CBC Auto Differential; Future; Expected date: 05/26/2023  -     Comprehensive Metabolic Panel; Future; Expected date: 05/26/2023  -     amLODIPine (NORVASC) 5 MG tablet; Take 1 tablet (5 mg total) by mouth once daily.  Dispense: 90 tablet; Refill: 2  -     Urinalysis    Bilateral carotid artery stenosis - atherosclerosis noted without significant stenosis  -     pravastatin (PRAVACHOL) 10 MG tablet; Take 1 tablet (10 mg total) by mouth once daily.  Dispense: 90 tablet; Refill: 2    Thoracic aorta atherosclerosis  -     pravastatin (PRAVACHOL) 10 MG tablet; Take 1 tablet (10 mg total) by mouth once daily.  Dispense: 90 tablet; Refill: 2    Old lacunar stroke without late effect  -     clopidogreL (PLAVIX) 75 mg tablet; Take 1 tablet (75 mg total) by mouth once daily.  Dispense: 90 tablet; Refill: 2    Chronic obstructive pulmonary disease, unspecified COPD type - asymptomatic.     Former tobacco use - stay QUIT!    Osteopenia, unspecified location - adequate calcium and vitamin D.    Vitamin D deficiency  -     Vitamin D; Future; Expected date: 05/26/2023  -     ergocalciferol (ERGOCALCIFEROL) 50,000 unit Cap; Take 1 capsule (50,000 Units total) by mouth every 7 days.  Dispense: 12 capsule; Refill: 2    Weight loss  -     TSH; Future; Expected date: 05/26/2023    Need for COVID-19 vaccine - may take a second dose of the Omicron booster.          Follow up in about 6 months (around 11/26/2023).

## 2023-07-11 NOTE — TELEPHONE ENCOUNTER
----- Message from Galilea Lee sent at 3/27/2020  1:03 PM CDT -----  Contact: Patient [phone]  Stated that she is having back pain and wants to know is she can take Rx [place].    Please call and advise.    Thank You     show

## 2023-07-14 ENCOUNTER — TELEPHONE (OUTPATIENT)
Dept: PRIMARY CARE CLINIC | Facility: CLINIC | Age: 83
End: 2023-07-14
Payer: MEDICARE

## 2023-07-14 NOTE — TELEPHONE ENCOUNTER
----- Message from Saji Ruby sent at 7/14/2023 12:46 PM CDT -----  Contact: self [phone]  Pt requesting a call in regards to last visit.    Please call and advise

## 2023-07-20 ENCOUNTER — TELEPHONE (OUTPATIENT)
Dept: PRIMARY CARE CLINIC | Facility: CLINIC | Age: 83
End: 2023-07-20
Payer: MEDICARE

## 2023-07-20 NOTE — TELEPHONE ENCOUNTER
----- Message from Terese Moore sent at 7/20/2023 12:25 PM CDT -----  Contact: [phone]  Pt is calling in regards to see if her paperwork is completed ASAP. Please call and advise. Thanks

## 2023-08-03 ENCOUNTER — TELEPHONE (OUTPATIENT)
Dept: PRIMARY CARE CLINIC | Facility: CLINIC | Age: 83
End: 2023-08-03
Payer: MEDICARE

## 2023-08-03 NOTE — TELEPHONE ENCOUNTER
----- Message from Dulce Valentin sent at 8/3/2023 10:37 AM CDT -----  Contact: Pt [phone]  Pt called in regards to speaking with  Sung about paper work please call and advise.

## 2023-08-03 NOTE — TELEPHONE ENCOUNTER
Pt asking about a health risk assessment summary she received from [facility] she is asking about the smoking section be updated pt advised it state in her medical chart she is a former smoker py [facility]

## 2023-12-04 ENCOUNTER — OFFICE VISIT (OUTPATIENT)
Dept: PRIMARY CARE CLINIC | Facility: CLINIC | Age: 83
End: 2023-12-04
Payer: MEDICARE

## 2023-12-04 ENCOUNTER — LAB VISIT (OUTPATIENT)
Dept: LAB | Facility: HOSPITAL | Age: 83
End: 2023-12-04
Attending: INTERNAL MEDICINE
Payer: MEDICARE

## 2023-12-04 VITALS
WEIGHT: 119 LBS | DIASTOLIC BLOOD PRESSURE: 60 MMHG | HEART RATE: 60 BPM | SYSTOLIC BLOOD PRESSURE: 100 MMHG | OXYGEN SATURATION: 97 % | BODY MASS INDEX: 18.68 KG/M2 | HEIGHT: 67 IN | TEMPERATURE: 98 F

## 2023-12-04 DIAGNOSIS — H91.90 HEARING LOSS, UNSPECIFIED HEARING LOSS TYPE, UNSPECIFIED LATERALITY: ICD-10-CM

## 2023-12-04 DIAGNOSIS — I70.0 THORACIC AORTA ATHEROSCLEROSIS: ICD-10-CM

## 2023-12-04 DIAGNOSIS — Z23 INFLUENZA VACCINE NEEDED: ICD-10-CM

## 2023-12-04 DIAGNOSIS — R63.4 WEIGHT LOSS, UNINTENTIONAL: ICD-10-CM

## 2023-12-04 DIAGNOSIS — I65.23 BILATERAL CAROTID ARTERY STENOSIS: ICD-10-CM

## 2023-12-04 DIAGNOSIS — Z86.73 OLD LACUNAR STROKE WITHOUT LATE EFFECT: ICD-10-CM

## 2023-12-04 DIAGNOSIS — I11.9 HYPERTENSIVE HEART DISEASE WITHOUT HEART FAILURE: ICD-10-CM

## 2023-12-04 DIAGNOSIS — I11.9 HYPERTENSIVE HEART DISEASE WITHOUT HEART FAILURE: Primary | ICD-10-CM

## 2023-12-04 DIAGNOSIS — E55.9 VITAMIN D DEFICIENCY: ICD-10-CM

## 2023-12-04 DIAGNOSIS — Z23 NEED FOR COVID-19 VACCINE: ICD-10-CM

## 2023-12-04 DIAGNOSIS — Z29.11 NEED FOR RSV VACCINATION: ICD-10-CM

## 2023-12-04 DIAGNOSIS — J44.9 CHRONIC OBSTRUCTIVE PULMONARY DISEASE, UNSPECIFIED COPD TYPE: ICD-10-CM

## 2023-12-04 DIAGNOSIS — F17.200 TOBACCO USE DISORDER: ICD-10-CM

## 2023-12-04 LAB
ALBUMIN SERPL BCP-MCNC: 3.6 G/DL (ref 3.5–5.2)
ALP SERPL-CCNC: 65 U/L (ref 55–135)
ALT SERPL W/O P-5'-P-CCNC: 9 U/L (ref 10–44)
ANION GAP SERPL CALC-SCNC: 9 MMOL/L (ref 8–16)
AST SERPL-CCNC: 16 U/L (ref 10–40)
BILIRUB SERPL-MCNC: 1.2 MG/DL (ref 0.1–1)
BUN SERPL-MCNC: 9 MG/DL (ref 8–23)
CALCIUM SERPL-MCNC: 9.6 MG/DL (ref 8.7–10.5)
CHLORIDE SERPL-SCNC: 98 MMOL/L (ref 95–110)
CHOLEST SERPL-MCNC: 161 MG/DL (ref 120–199)
CHOLEST/HDLC SERPL: 1.6 {RATIO} (ref 2–5)
CO2 SERPL-SCNC: 30 MMOL/L (ref 23–29)
CREAT SERPL-MCNC: 0.8 MG/DL (ref 0.5–1.4)
ERYTHROCYTE [DISTWIDTH] IN BLOOD BY AUTOMATED COUNT: 13.6 % (ref 11.5–14.5)
EST. GFR  (NO RACE VARIABLE): >60 ML/MIN/1.73 M^2
GLUCOSE SERPL-MCNC: 112 MG/DL (ref 70–110)
HCT VFR BLD AUTO: 38.2 % (ref 37–48.5)
HDLC SERPL-MCNC: 98 MG/DL (ref 40–75)
HDLC SERPL: 60.9 % (ref 20–50)
HGB BLD-MCNC: 13 G/DL (ref 12–16)
LDLC SERPL CALC-MCNC: 53.2 MG/DL (ref 63–159)
MCH RBC QN AUTO: 31.8 PG (ref 27–31)
MCHC RBC AUTO-ENTMCNC: 34 G/DL (ref 32–36)
MCV RBC AUTO: 93 FL (ref 82–98)
NONHDLC SERPL-MCNC: 63 MG/DL
PLATELET # BLD AUTO: 237 K/UL (ref 150–450)
PMV BLD AUTO: 10.1 FL (ref 9.2–12.9)
POTASSIUM SERPL-SCNC: 3.5 MMOL/L (ref 3.5–5.1)
PROT SERPL-MCNC: 6.5 G/DL (ref 6–8.4)
RBC # BLD AUTO: 4.09 M/UL (ref 4–5.4)
SODIUM SERPL-SCNC: 137 MMOL/L (ref 136–145)
TRIGL SERPL-MCNC: 49 MG/DL (ref 30–150)
WBC # BLD AUTO: 5.15 K/UL (ref 3.9–12.7)

## 2023-12-04 PROCEDURE — 3288F PR FALLS RISK ASSESSMENT DOCUMENTED: ICD-10-PCS | Mod: CPTII,S$GLB,, | Performed by: INTERNAL MEDICINE

## 2023-12-04 PROCEDURE — 99214 OFFICE O/P EST MOD 30 MIN: CPT | Mod: S$GLB,,, | Performed by: INTERNAL MEDICINE

## 2023-12-04 PROCEDURE — 1160F PR REVIEW ALL MEDS BY PRESCRIBER/CLIN PHARMACIST DOCUMENTED: ICD-10-PCS | Mod: CPTII,S$GLB,, | Performed by: INTERNAL MEDICINE

## 2023-12-04 PROCEDURE — 1160F RVW MEDS BY RX/DR IN RCRD: CPT | Mod: CPTII,S$GLB,, | Performed by: INTERNAL MEDICINE

## 2023-12-04 PROCEDURE — 99999 PR PBB SHADOW E&M-EST. PATIENT-LVL IV: ICD-10-PCS | Mod: PBBFAC,,, | Performed by: INTERNAL MEDICINE

## 2023-12-04 PROCEDURE — 99214 PR OFFICE/OUTPT VISIT, EST, LEVL IV, 30-39 MIN: ICD-10-PCS | Mod: S$GLB,,, | Performed by: INTERNAL MEDICINE

## 2023-12-04 PROCEDURE — 85027 COMPLETE CBC AUTOMATED: CPT | Performed by: INTERNAL MEDICINE

## 2023-12-04 PROCEDURE — 1159F MED LIST DOCD IN RCRD: CPT | Mod: CPTII,S$GLB,, | Performed by: INTERNAL MEDICINE

## 2023-12-04 PROCEDURE — 1126F PR PAIN SEVERITY QUANTIFIED, NO PAIN PRESENT: ICD-10-PCS | Mod: CPTII,S$GLB,, | Performed by: INTERNAL MEDICINE

## 2023-12-04 PROCEDURE — 80061 LIPID PANEL: CPT | Performed by: INTERNAL MEDICINE

## 2023-12-04 PROCEDURE — 1101F PT FALLS ASSESS-DOCD LE1/YR: CPT | Mod: CPTII,S$GLB,, | Performed by: INTERNAL MEDICINE

## 2023-12-04 PROCEDURE — 1126F AMNT PAIN NOTED NONE PRSNT: CPT | Mod: CPTII,S$GLB,, | Performed by: INTERNAL MEDICINE

## 2023-12-04 PROCEDURE — 1159F PR MEDICATION LIST DOCUMENTED IN MEDICAL RECORD: ICD-10-PCS | Mod: CPTII,S$GLB,, | Performed by: INTERNAL MEDICINE

## 2023-12-04 PROCEDURE — 1101F PR PT FALLS ASSESS DOC 0-1 FALLS W/OUT INJ PAST YR: ICD-10-PCS | Mod: CPTII,S$GLB,, | Performed by: INTERNAL MEDICINE

## 2023-12-04 PROCEDURE — 3078F DIAST BP <80 MM HG: CPT | Mod: CPTII,S$GLB,, | Performed by: INTERNAL MEDICINE

## 2023-12-04 PROCEDURE — 80053 COMPREHEN METABOLIC PANEL: CPT | Performed by: INTERNAL MEDICINE

## 2023-12-04 PROCEDURE — 99999 PR PBB SHADOW E&M-EST. PATIENT-LVL IV: CPT | Mod: PBBFAC,,, | Performed by: INTERNAL MEDICINE

## 2023-12-04 PROCEDURE — 3074F PR MOST RECENT SYSTOLIC BLOOD PRESSURE < 130 MM HG: ICD-10-PCS | Mod: CPTII,S$GLB,, | Performed by: INTERNAL MEDICINE

## 2023-12-04 PROCEDURE — 3074F SYST BP LT 130 MM HG: CPT | Mod: CPTII,S$GLB,, | Performed by: INTERNAL MEDICINE

## 2023-12-04 PROCEDURE — 3288F FALL RISK ASSESSMENT DOCD: CPT | Mod: CPTII,S$GLB,, | Performed by: INTERNAL MEDICINE

## 2023-12-04 PROCEDURE — 36415 COLL VENOUS BLD VENIPUNCTURE: CPT | Mod: PN | Performed by: INTERNAL MEDICINE

## 2023-12-04 PROCEDURE — 3078F PR MOST RECENT DIASTOLIC BLOOD PRESSURE < 80 MM HG: ICD-10-PCS | Mod: CPTII,S$GLB,, | Performed by: INTERNAL MEDICINE

## 2023-12-04 RX ORDER — CLOPIDOGREL BISULFATE 75 MG/1
75 TABLET ORAL DAILY
Qty: 90 TABLET | Refills: 1 | Status: SHIPPED | OUTPATIENT
Start: 2023-12-04

## 2023-12-04 RX ORDER — AMLODIPINE BESYLATE 5 MG/1
5 TABLET ORAL DAILY
Qty: 90 TABLET | Refills: 1 | Status: SHIPPED | OUTPATIENT
Start: 2023-12-04

## 2023-12-04 RX ORDER — ERGOCALCIFEROL 1.25 MG/1
50000 CAPSULE ORAL
Qty: 12 CAPSULE | Refills: 1 | Status: SHIPPED | OUTPATIENT
Start: 2023-12-04

## 2023-12-04 RX ORDER — PRAVASTATIN SODIUM 10 MG/1
10 TABLET ORAL DAILY
Qty: 90 TABLET | Refills: 1 | Status: SHIPPED | OUTPATIENT
Start: 2023-12-04

## 2023-12-04 NOTE — PROGRESS NOTES
Subjective     Patient ID: Jessenia Da Silva is a 83 y.o. female.    Chief Complaint: Annual Exam    Last seen 6 months ago. Complete physical done at that time, except Cholesterol was not checked. Returns for scheduled follow up. Ongoing slow, gradual weight loss which she has attributed to decreased appetite. Just doesn't eat as much as she used to. Denies food insecurity. No new somatic complaints. Had stopped smoking earlier in the year, but picked it up again. Hearing impairment x years, she requests referral to a new ENT doctor.     PMH:   Hypertension with concentric remodeling on echo , normal LV function.   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   Cervical spondylosis.  Mild Bilateral Carotid Artery Disease - minimal on ultrasound in Cardiology .  Osteopenia.   COPD, mild on PFT's .  Hyperbilirubinemia, mild, chronic.   Hearing loss.    PSH: Tonsillectomy. Hysterectomy and BSO. Lasik right eye. Bilateral Cataract extraction with lens implants.    Mammogram normal  - declines repeat. BMD stable . EKG normal 3/18. CXR clear . Colonoscopy normal >10 yrs ago. Eye exam  Dr. Hill. Vaccines reviewed.     Social: smoking, 2 sm beers nightly. , adult son lives locally - he is a heart and kidney transplant recipient. Retired consumer credit counselor.     FMH: HTN in father, dementia in mother, pancreatic cancer in sister.     NKDA.     Medications: Amlodipine 5mg daily, Pravastatin 10mg daily, Plavix 75mg daily, Flonase prn, Vitamin D 50,000 weekly.     Review of Systems   Constitutional:  Positive for appetite change and unexpected weight change. Negative for activity change, chills, diaphoresis, fatigue and fever.   HENT:  Positive for hearing loss. Negative for nasal congestion, ear pain, rhinorrhea, sneezing, sore throat, trouble swallowing and voice change.    Eyes:  Negative for pain and  "visual disturbance.   Respiratory:  Negative for cough, chest tightness, shortness of breath and wheezing.    Cardiovascular:  Negative for chest pain, palpitations and leg swelling.   Gastrointestinal:  Negative for abdominal pain, blood in stool, constipation, diarrhea, nausea and vomiting.   Genitourinary:  Negative for dysuria, frequency, hematuria, pelvic pain and vaginal bleeding.   Musculoskeletal:  Negative for arthralgias, gait problem, joint swelling and myalgias.   Integumentary:  Negative for color change and rash.   Neurological:  Negative for dizziness, syncope, facial asymmetry, speech difficulty, weakness, numbness and headaches.   Hematological:  Negative for adenopathy. Does not bruise/bleed easily.   Psychiatric/Behavioral:  Negative for confusion, dysphoric mood and sleep disturbance. The patient is not nervous/anxious.           Objective   Vitals:    12/04/23 1044   BP: 100/60   Pulse: 60   Temp: 98 °F (36.7 °C)   SpO2: 97%   Weight: 54 kg (119 lb)            From 121 six months ago.    Height: 5' 7" (1.702 m)   BMI=18.6  Physical Exam  Vitals reviewed.   Constitutional:       General: She is not in acute distress.     Appearance: She is well-developed. She is not diaphoretic.      Comments: Well groomed, ambulatory with normal gait, here alone.   HENT:      Head: Normocephalic and atraumatic.      Right Ear: There is impacted cerumen.      Left Ear: Tympanic membrane and ear canal normal.      Nose: Nose normal. No congestion.      Mouth/Throat:      Mouth: Mucous membranes are moist.      Pharynx: Oropharynx is clear.   Eyes:      General: No scleral icterus.     Extraocular Movements: Extraocular movements intact.      Conjunctiva/sclera: Conjunctivae normal.      Right eye: Right conjunctiva is not injected.      Left eye: Left conjunctiva is not injected.      Pupils: Pupils are equal, round, and reactive to light.   Neck:      Thyroid: No thyromegaly.      Vascular: No JVD. "   Cardiovascular:      Rate and Rhythm: Normal rate and regular rhythm.      Pulses: Normal pulses.      Heart sounds: Normal heart sounds. No murmur heard.     No friction rub. No gallop.   Pulmonary:      Effort: Pulmonary effort is normal. No respiratory distress.      Breath sounds: Normal breath sounds. No wheezing, rhonchi or rales.   Abdominal:      General: Bowel sounds are normal. There is no distension.      Palpations: Abdomen is soft. There is no mass.      Tenderness: There is no abdominal tenderness.   Musculoskeletal:         General: No tenderness or deformity. Normal range of motion.      Cervical back: Normal range of motion and neck supple.      Right lower leg: No edema.      Left lower leg: No edema.   Lymphadenopathy:      Cervical: No cervical adenopathy.   Skin:     General: Skin is warm and dry.      Coloration: Skin is not pale.      Findings: No erythema or rash.      Nails: There is no clubbing.   Neurological:      General: No focal deficit present.      Mental Status: She is alert and oriented to person, place, and time.      Cranial Nerves: No cranial nerve deficit.      Motor: No weakness or abnormal muscle tone.      Coordination: Coordination normal.      Gait: Gait normal.   Psychiatric:         Mood and Affect: Mood and affect normal.         Speech: Speech normal.         Behavior: Behavior normal.         Thought Content: Thought content normal.     No visits with results within 3 Week(s) from this visit.   Latest known visit with results is:   Lab Visit on 05/26/2023   Component Date Value    WBC 05/26/2023 7.32     RBC 05/26/2023 4.12     Hemoglobin 05/26/2023 12.8     Hematocrit 05/26/2023 39.1     MCV 05/26/2023 95     MCH 05/26/2023 31.1 (H)     MCHC 05/26/2023 32.7     RDW 05/26/2023 13.6     Platelets 05/26/2023 259     MPV 05/26/2023 10.0     Immature Granulocytes 05/26/2023 0.1     Gran # (ANC) 05/26/2023 4.8     Immature Grans (Abs) 05/26/2023 0.01      Lymph # 05/26/2023 1.7     Mono # 05/26/2023 0.7     Eos # 05/26/2023 0.1     Baso # 05/26/2023 0.04     nRBC 05/26/2023 0     Gran % 05/26/2023 65.3     Lymph % 05/26/2023 23.6     Mono % 05/26/2023 9.7     Eosinophil % 05/26/2023 0.8     Basophil % 05/26/2023 0.5     Differential Method 05/26/2023 Automated     Sodium 05/26/2023 136     Potassium 05/26/2023 3.8     Chloride 05/26/2023 96     CO2 05/26/2023 29     Glucose 05/26/2023 81     BUN 05/26/2023 9     Creatinine 05/26/2023 0.7     Calcium 05/26/2023 9.6     Total Protein 05/26/2023 6.6     Albumin 05/26/2023 3.8     Total Bilirubin 05/26/2023 1.2 (H)     Alkaline Phosphatase 05/26/2023 67     AST 05/26/2023 17     ALT 05/26/2023 10     Anion Gap 05/26/2023 11     eGFR 05/26/2023 >60.0     TSH 05/26/2023 1.205     Vit D, 25-Hydroxy 05/26/2023 38    Urinalysis clear.      Assessment and Plan     1. Hypertensive heart disease without heart failure - BP controlled, not usually this low, continue same.  -     CBC Without Differential; Future; Expected date: 12/04/2023  -     Comprehensive Metabolic Panel; Future; Expected date: 12/04/2023  -     Lipid Panel; Future; Expected date: 12/04/2023  -     amLODIPine (NORVASC) 5 MG tablet; Take 1 tablet (5 mg total) by mouth once daily.  Dispense: 90 tablet; Refill: 1    2. Chronic obstructive pulmonary disease, unspecified COPD type - asymptomatic.    3. Weight loss, unintentional  -     CT Chest Abdomen Pelvis With IV Contrast (XPD) Routine Oral Contrast; Future; Expected date: 12/04/2023    4. Tobacco use disorder  -     CT Chest Abdomen Pelvis With IV Contrast (XPD) Routine Oral Contrast; Future; Expected date: 12/04/2023    5. Influenza vaccine needed - Flu shot today.     6. Need for COVID-19 vaccine - COVID booster today.     7. Need for RSV vaccination - deferred to a later date.     8. Hearing loss, unspecified hearing loss type, unspecified laterality  -     Ambulatory  referral/consult to ENT; Future; Expected date: 12/11/2023  -     Ambulatory referral/consult to Audiology; Future; Expected date: 12/11/2023    9. Bilateral carotid artery stenosis  -     pravastatin (PRAVACHOL) 10 MG tablet; Take 1 tablet (10 mg total) by mouth once daily.  Dispense: 90 tablet; Refill: 1    10. Thoracic aorta atherosclerosis  -     pravastatin (PRAVACHOL) 10 MG tablet; Take 1 tablet (10 mg total) by mouth once daily.  Dispense: 90 tablet; Refill: 1    11. Vitamin D deficiency  -     ergocalciferol (ERGOCALCIFEROL) 50,000 unit Cap; Take 1 capsule (50,000 Units total) by mouth every 7 days.  Dispense: 12 capsule; Refill: 1    12. Old lacunar stroke without late effect  -     clopidogreL (PLAVIX) 75 mg tablet; Take 1 tablet (75 mg total) by mouth once daily.  Dispense: 90 tablet; Refill: 1       Follow up in about 6 months (around 6/4/2024).

## 2023-12-06 ENCOUNTER — TELEPHONE (OUTPATIENT)
Dept: PRIMARY CARE CLINIC | Facility: CLINIC | Age: 83
End: 2023-12-06
Payer: MEDICARE

## 2023-12-06 NOTE — TELEPHONE ENCOUNTER
Labs are all stable, good results. Nothing seen that would explain weight loss. Continue with plans for CT scan as scheduled.

## 2023-12-20 ENCOUNTER — OFFICE VISIT (OUTPATIENT)
Dept: OTOLARYNGOLOGY | Facility: CLINIC | Age: 83
End: 2023-12-20
Payer: MEDICARE

## 2023-12-20 ENCOUNTER — CLINICAL SUPPORT (OUTPATIENT)
Dept: AUDIOLOGY | Facility: CLINIC | Age: 83
End: 2023-12-20
Payer: MEDICARE

## 2023-12-20 DIAGNOSIS — H90.3 SENSORINEURAL HEARING LOSS (SNHL) OF BOTH EARS: Primary | ICD-10-CM

## 2023-12-20 DIAGNOSIS — H61.21 IMPACTED CERUMEN OF RIGHT EAR: ICD-10-CM

## 2023-12-20 DIAGNOSIS — H91.90 HEARING LOSS, UNSPECIFIED HEARING LOSS TYPE, UNSPECIFIED LATERALITY: ICD-10-CM

## 2023-12-20 PROCEDURE — 99203 OFFICE O/P NEW LOW 30 MIN: CPT | Mod: 25,S$GLB,, | Performed by: NURSE PRACTITIONER

## 2023-12-20 PROCEDURE — 92557 COMPREHENSIVE HEARING TEST: CPT | Mod: S$GLB,,,

## 2023-12-20 PROCEDURE — 92557 PR COMPREHENSIVE HEARING TEST: ICD-10-PCS | Mod: S$GLB,,,

## 2023-12-20 PROCEDURE — 99999 PR PBB SHADOW E&M-EST. PATIENT-LVL I: CPT | Mod: PBBFAC,,,

## 2023-12-20 PROCEDURE — 92567 PR TYMPA2METRY: ICD-10-PCS | Mod: S$GLB,,,

## 2023-12-20 PROCEDURE — 99203 PR OFFICE/OUTPT VISIT, NEW, LEVL III, 30-44 MIN: ICD-10-PCS | Mod: 25,S$GLB,, | Performed by: NURSE PRACTITIONER

## 2023-12-20 PROCEDURE — 99999 PR PBB SHADOW E&M-EST. PATIENT-LVL I: ICD-10-PCS | Mod: PBBFAC,,,

## 2023-12-20 PROCEDURE — 92567 TYMPANOMETRY: CPT | Mod: S$GLB,,,

## 2023-12-20 NOTE — PROGRESS NOTES
Subjective:   Jessenia Da Silva is a 83 y.o. female who presents for a hearing evaluation. She reports bilateral hearing loss that presented gradually over years. She is aware that she struggles in conversation. She obtained hearing aids from an outside vendor that she find uncomfortable so she doesn't wear them. Today she denies any otalgia, otorrhea, ear fullness/pressure, tinnitus or vertigo. There is not a family history of hearing loss at a young age. There is not a prior history of ear surgery. There is not a prior history of ear infections. There is not a history of ear trauma. She denies a history of significant noise exposure.     Past Medical History  She has a past medical history of Bilateral carotid artery disease, Cervical spondylosis, Hypertension, Osteopenia, and TIA (transient ischemic attack).    Past Surgical History  She has a past surgical history that includes Hysterectomy; Tonsillectomy; and Cataract extraction w/ intraocular lens  implant, bilateral.    Family History  Her family history includes Breast cancer in her sister; Dementia in her mother; Hypertension in her father; Pancreatic cancer in her sister.    Social History  She reports that she quit smoking about 9 months ago. Her smoking use included cigarettes. She has never used smokeless tobacco. She reports current alcohol use. She reports that she does not use drugs.    Allergies  She has No Known Allergies.    Medications  She has a current medication list which includes the following prescription(s): amlodipine, clopidogrel, ergocalciferol, fluticasone propionate, levocetirizine, and pravastatin.  Review of Systems     Constitutional: Negative for chills and fever.      HENT: Positive for hearing loss.  Negative for ear discharge, ear infection, ear pain and ringing in the ears.      Neurological: Negative for dizziness, headaches and light-headedness.          Objective:     Constitutional:   She is oriented to person, place, and  time. She appears well-developed and well-nourished. She appears alert. She is cooperative.  Non-toxic appearance. She does not have a sickly appearance. She does not appear ill. Normal speech.      Head:  Normocephalic and atraumatic. Not macrocephalic and not microcephalic. Head is without abrasion, without right periorbital erythema, without left periorbital erythema and without TMJ tenderness.     Ears:    Right Ear: No drainage, swelling or tenderness. No mastoid tenderness. Tympanic membrane is not scarred, not perforated, not erythematous, not retracted and not bulging. No middle ear effusion.   Left Ear: No drainage, swelling or tenderness. No mastoid tenderness. Tympanic membrane is not scarred, not perforated, not erythematous, not retracted and not bulging.  No middle ear effusion.   Narrow EAC bilaterally    Ceruminous debris obstructing right TM removed     Pulmonary/Chest:   Effort normal.     Psychiatric:   She has a normal mood and affect. Her speech is normal and behavior is normal.     Neurological:   She is alert and oriented to person, place, and time.     Procedure  Cerumen removal performed.  See procedure note.  Procedure Note:  The patient was brought to the minor procedure room and placed under the operating microscope of the right ear canal which was cleaned of ceruminous debris. Using a combination of suction, curettes and cup forceps the patient's cerumen was removed. The patient tolerated the procedure well. There were no complications.      Audiogram    I independently reviewed the tracings of the complete audiometric evaluation performed today. I reviewed the audiogram with the patient as well. Pertinent findings include moderate to moderately severe sensorineural hearing loss in the right ear and moderate to moderately severe sensorineural hearing loss in the left ear.     Assessment:     1. Sensorineural hearing loss (SNHL) of both ears    2. Impacted cerumen of right ear      Plan:      Sensorineural hearing loss (SNHL) of both ears  Audiometric testing interpretation consistent with sensorineural hearing loss. Discussed the etiology of SNHL. Medically cleared for hearing amplification, and will follow-up with her outside audiologist for a hearing aid assessment. Hearing conservation in noisy environments.     She is interested in possibly getting new hearing aids and was given [facility]'s hearing aid department contact information.

## 2023-12-20 NOTE — PROGRESS NOTES
Jessenia Da Silva was seen today in the clinic for an audiologic evaluation.  Patient's main complaint was otalgia.  Mrs. Da Silva reported a history of hearing loss bilaterally and she currently wears [employer] hearing aids that she purchased outside of [facility]. She noted pain in both ears when wearing her devices. She denied aural fullness, tinnitus, and vertigo today.    Tympanometry revealed Type As in the right ear and Type As in the left ear.     Audiogram results revealed moderate to moderately severe sensorineural hearing loss in the right ear and moderate to moderately severe sensorineural hearing loss in the left ear.      Speech reception thresholds were noted at 40 dB in the right ear and 40 dB in the left ear.    Speech discrimination scores were 88% in the right ear and 84% in the left ear.    Recommendations:  Otologic evaluation  Hearing aid follow up care with dispensing audiologist  Hearing protection when in noise  Annual audiogram

## 2023-12-22 ENCOUNTER — HOSPITAL ENCOUNTER (OUTPATIENT)
Dept: RADIOLOGY | Facility: HOSPITAL | Age: 83
Discharge: HOME | End: 2023-12-22
Attending: INTERNAL MEDICINE
Payer: MEDICARE

## 2023-12-22 DIAGNOSIS — F17.200 TOBACCO USE DISORDER: ICD-10-CM

## 2023-12-22 DIAGNOSIS — R63.4 WEIGHT LOSS, UNINTENTIONAL: ICD-10-CM

## 2023-12-22 PROCEDURE — 71260 CT THORAX DX C+: CPT | Mod: TC

## 2023-12-22 PROCEDURE — 74177 CT ABD & PELVIS W/CONTRAST: CPT | Mod: 26,,, | Performed by: RADIOLOGY

## 2023-12-22 PROCEDURE — A9698 NON-RAD CONTRAST MATERIALNOC: HCPCS | Performed by: INTERNAL MEDICINE

## 2023-12-22 PROCEDURE — 71260 CT THORAX DX C+: CPT | Mod: 26,,, | Performed by: RADIOLOGY

## 2023-12-22 PROCEDURE — 71260 CT CHEST ABDOMEN PELVIS WITH IV CONTRAST (XPD): ICD-10-PCS | Mod: 26,,, | Performed by: RADIOLOGY

## 2023-12-22 PROCEDURE — 74177 CT ABD & PELVIS W/CONTRAST: CPT | Mod: TC

## 2023-12-22 PROCEDURE — 25500020 PHARM REV CODE 255: Performed by: INTERNAL MEDICINE

## 2023-12-22 PROCEDURE — 74177 CT CHEST ABDOMEN PELVIS WITH IV CONTRAST (XPD): ICD-10-PCS | Mod: 26,,, | Performed by: RADIOLOGY

## 2023-12-22 RX ADMIN — IOHEXOL 100 ML: 350 INJECTION, SOLUTION INTRAVENOUS at 01:12

## 2023-12-22 RX ADMIN — BARIUM SULFATE 450 ML: 20 SUSPENSION ORAL at 01:12

## 2023-12-27 ENCOUNTER — TELEPHONE (OUTPATIENT)
Dept: PRIMARY CARE CLINIC | Facility: CLINIC | Age: 83
End: 2023-12-27
Payer: MEDICARE

## 2023-12-27 NOTE — TELEPHONE ENCOUNTER
CT scan of the Chest and Abdomen does not show any sign of cancer regarding her weight loss. No further testing needed at this time.

## 2024-01-16 ENCOUNTER — TELEPHONE (OUTPATIENT)
Dept: PRIMARY CARE CLINIC | Facility: CLINIC | Age: 84
End: 2024-01-16
Payer: MEDICARE

## 2024-01-16 NOTE — TELEPHONE ENCOUNTER
----- Message from Estephania Alberts sent at 1/16/2024 12:29 PM CST -----  Contact: [phone]  [email or web address]     Patient is calling for Medical Advice regarding:speak to the nurse     How long has patient had these symptoms:    Pharmacy name and phone#:    Would like response via [email or web address]:  no     Comments:  Pt is calling she states she is needing a call back she states she got a shot here and she has some questions about the shot

## 2024-01-17 ENCOUNTER — TELEPHONE (OUTPATIENT)
Dept: PRIMARY CARE CLINIC | Facility: CLINIC | Age: 84
End: 2024-01-17
Payer: MEDICARE

## 2024-01-17 NOTE — TELEPHONE ENCOUNTER
----- Message from Tiffanie Mack sent at 1/17/2024  2:24 PM CST -----  Contact: Pt [phone]  1MEDICALADVICE     Patient is calling for Medical Advice regarding: Cough    How long has patient had these symptoms: 2 days    Pharmacy name and phone#: [id number] [place] [facility] [address] Phone: [phone] Fax: [phone]

## 2024-01-18 ENCOUNTER — OFFICE VISIT (OUTPATIENT)
Dept: PRIMARY CARE CLINIC | Facility: CLINIC | Age: 84
End: 2024-01-18
Payer: MEDICARE

## 2024-01-18 VITALS
HEIGHT: 67 IN | BODY MASS INDEX: 19.1 KG/M2 | DIASTOLIC BLOOD PRESSURE: 62 MMHG | OXYGEN SATURATION: 98 % | RESPIRATION RATE: 16 BRPM | TEMPERATURE: 99 F | WEIGHT: 121.69 LBS | HEART RATE: 73 BPM | SYSTOLIC BLOOD PRESSURE: 102 MMHG

## 2024-01-18 DIAGNOSIS — R06.2 WHEEZING WITHOUT DIAGNOSIS OF ASTHMA: Primary | ICD-10-CM

## 2024-01-18 DIAGNOSIS — J98.8 CONGESTION OF RESPIRATORY TRACT: ICD-10-CM

## 2024-01-18 LAB
CTP QC/QA: YES
CTP QC/QA: YES
FLUAV AG NPH QL: NEGATIVE
FLUBV AG NPH QL: NEGATIVE
SARS-COV-2 RDRP RESP QL NAA+PROBE: NEGATIVE

## 2024-01-18 PROCEDURE — 99999 PR PBB SHADOW E&M-EST. PATIENT-LVL IV: CPT | Mod: PBBFAC,,,

## 2024-01-18 PROCEDURE — 87804 INFLUENZA ASSAY W/OPTIC: CPT | Mod: 59,QW,S$GLB,

## 2024-01-18 PROCEDURE — 87635 SARS-COV-2 COVID-19 AMP PRB: CPT | Mod: QW,S$GLB,,

## 2024-01-18 PROCEDURE — 99215 OFFICE O/P EST HI 40 MIN: CPT | Mod: S$GLB,,,

## 2024-01-18 RX ORDER — ALBUTEROL SULFATE 90 UG/1
2 AEROSOL, METERED RESPIRATORY (INHALATION) EVERY 6 HOURS PRN
Qty: 18 G | Refills: 0 | Status: SHIPPED | OUTPATIENT
Start: 2024-01-18

## 2024-01-18 NOTE — PATIENT INSTRUCTIONS
Symptomatic treatment:  PLAIN mucinex 1200 mg twice a day  Tylenol/Motrin for pain/fever  Flonase for nasal passages-continue use  Chloraseptic spray  Throat lozenges  Hot lemon/honey and/or tea-local honey is best for cough  Gargle warm salt water   Antihistamine daily-continue Xyzal  Saline nasal spray  Drink plenty water, fluids for rehydration, pedialyte

## 2024-01-18 NOTE — PROGRESS NOTES
"[facility] Note    Chief Complaint      Chief Complaint   Patient presents with    Cough     Patient states she has been coughing x 1 week and taking Mucinex, Robitussin, cleraceptic and is not helping.      History of Present Illness      Jessenia Da Silva is a 83 y.o. female patient of Dr. Schaefer who presents today for cough and congestion x1 week.  This pt is new to me.    PMH: COPD, HTN, atherosclerosis, tobacco use, hx stroke    Associated sx: white sputum  Denies fever, chills, fatigue, sinus pain, CP, palpitations, shortness of breath, headaches, dizziness, nausea vomiting abdominal pain  Pt stated she may go hours without coughing spell, then will cough a lot. She also said that if she blows her nose, it helps cough subside.    Treatments tried:  robitussin a few times and mucinex once-no relief noted    Pain: 0/10    Health Maintenance   Topic Date Due    DEXA Scan  2024    Aspirin/Antiplatelet Therapy  2025    Lipid Panel  2028    TETANUS VACCINE  10/01/2029    Shingles Vaccine  Completed       Past Medical History:   Diagnosis Date    Bilateral carotid artery disease     Cervical spondylosis     Hypertension     Osteopenia     TIA (transient ischemic attack)        Past Surgical History:   Procedure Laterality Date    CATARACT EXTRACTION W/ INTRAOCULAR LENS  IMPLANT, BILATERAL      HYSTERECTOMY      TONSILLECTOMY         family history includes Breast cancer in her sister; Dementia in her mother; Hypertension in her father; Pancreatic cancer in her sister.    Social History     Tobacco Use    Smoking status: Former     Current packs/day: 0.00     Types: Cigarettes     Quit date: 2023     Years since quittin.9    Smokeless tobacco: Never   Substance Use Topics    Alcohol use: Yes     Comment: 2 "ponies" (sm beer) nightly.    Drug use: No       Review of Systems   Constitutional:  Negative for chills, fever and malaise/fatigue.   HENT:  Positive for congestion. " "Negative for ear discharge, ear pain and sinus pain.    Eyes: Negative.    Respiratory:  Positive for cough. Negative for shortness of breath.    Cardiovascular: Negative.  Negative for chest pain and palpitations.   Gastrointestinal:  Negative for abdominal pain, constipation, diarrhea, nausea and vomiting.   Genitourinary: Negative.    Musculoskeletal: Negative.    Neurological:  Negative for dizziness and headaches.        Outpatient Encounter Medications as of 1/18/2024   Medication Sig Dispense Refill    amLODIPine (NORVASC) 5 MG tablet Take 1 tablet (5 mg total) by mouth once daily. 90 tablet 1    clopidogreL (PLAVIX) 75 mg tablet Take 1 tablet (75 mg total) by mouth once daily. 90 tablet 1    ergocalciferol (ERGOCALCIFEROL) 50,000 unit Cap Take 1 capsule (50,000 Units total) by mouth every 7 days. 12 capsule 1    fluticasone propionate (FLONASE) 50 mcg/actuation nasal spray 2 sprays (100 mcg total) by Each Nostril route once daily. 16 g 5    levocetirizine (XYZAL) 5 MG tablet Take 1 tablet (5 mg total) by mouth daily as needed for Allergies. 90 tablet 0    pravastatin (PRAVACHOL) 10 MG tablet Take 1 tablet (10 mg total) by mouth once daily. 90 tablet 1    albuterol (VENTOLIN HFA) 90 mcg/actuation inhaler Inhale 2 puffs into the lungs every 6 (six) hours as needed for Wheezing. Rescue 18 g 0     No facility-administered encounter medications on file as of 1/18/2024.        Review of patient's allergies indicates:  No Known Allergies    Physical Exam      Vital Signs  Temp: 98.8 °F (37.1 °C)  Temp Source: Oral  Pulse: 73  Resp: 16  SpO2: 98 %  BP: 102/62  BP Location: Left arm  Patient Position: Sitting  Pain Score: 0-No pain  Height and Weight  Height: 5' 7" (170.2 cm)  Weight: 55.2 kg (121 lb 11.1 oz)  BSA (Calculated - sq m): 1.62 sq meters  BMI (Calculated): 19.1  Weight in (lb) to have BMI = 25: 159.3    Physical Exam  Constitutional:       Appearance: Normal appearance.   HENT:      Head: Normocephalic " and atraumatic.      Right Ear: Hearing, tympanic membrane, ear canal and external ear normal.      Left Ear: Hearing, tympanic membrane, ear canal and external ear normal.      Nose: Congestion and rhinorrhea present. Rhinorrhea is purulent.      Right Turbinates: Enlarged.      Left Turbinates: Enlarged.      Right Sinus: No maxillary sinus tenderness or frontal sinus tenderness.      Left Sinus: No maxillary sinus tenderness or frontal sinus tenderness.      Mouth/Throat:      Pharynx: Uvula midline. Oropharyngeal exudate and posterior oropharyngeal erythema present.   Cardiovascular:      Rate and Rhythm: Normal rate and regular rhythm.      Pulses: Normal pulses.      Heart sounds: S1 normal and S2 normal.   Pulmonary:      Effort: Pulmonary effort is normal.      Breath sounds: Examination of the left-upper field reveals wheezing. Wheezing present.   Lymphadenopathy:      Head:      Right side of head: No submental, submandibular, tonsillar, preauricular, posterior auricular or occipital adenopathy.      Left side of head: No submental, submandibular, tonsillar, preauricular, posterior auricular or occipital adenopathy.      Cervical: No cervical adenopathy.   Skin:     General: Skin is warm and dry.      Capillary Refill: Capillary refill takes less than 2 seconds.   Neurological:      General: No focal deficit present.      Mental Status: She is alert and oriented to person, place, and time.   Psychiatric:         Mood and Affect: Mood normal.         Behavior: Behavior normal.          Laboratory:  CBC:  Lab Results   Component Value Date    WBC 5.15 12/04/2023    RBC 4.09 12/04/2023    HGB 13.0 12/04/2023    HCT 38.2 12/04/2023     12/04/2023    MCV 93 12/04/2023    MCH 31.8 (H) 12/04/2023    MCHC 34.0 12/04/2023    MCHC 32.7 05/26/2023    MCHC 34.7 08/30/2022     CMP:  Lab Results   Component Value Date     (H) 12/04/2023    CALCIUM 9.6 12/04/2023    ALBUMIN 3.6 12/04/2023    PROT 6.5  "12/04/2023     12/04/2023    K 3.5 12/04/2023    CO2 30 (H) 12/04/2023    CL 98 12/04/2023    BUN 9 12/04/2023    ALKPHOS 65 12/04/2023    ALT 9 (L) 12/04/2023    AST 16 12/04/2023    BILITOT 1.2 (H) 12/04/2023    BILITOT 1.2 (H) 05/26/2023    BILITOT 1.0 08/30/2022     URINALYSIS:  Lab Results   Component Value Date    COLORU Yellow 05/26/2023    SPECGRAV 1.015 05/26/2023    PHUR 7.0 05/26/2023    PROTEINUA Negative 05/26/2023    BACTERIA Rare 04/03/2019    NITRITE Negative 05/26/2023    LEUKOCYTESUR Trace (A) 05/26/2023    UROBILINOGEN Negative 02/22/2018      LIPIDS:  Lab Results   Component Value Date    TSH 1.205 05/26/2023    HDL 98 (H) 12/04/2023    HDL 96 (H) 08/30/2022    HDL 95 (H) 08/18/2021    CHOL 161 12/04/2023    CHOL 167 08/30/2022    CHOL 162 08/18/2021    TRIG 49 12/04/2023    TRIG 62 08/30/2022    TRIG 65 08/18/2021    LDLCALC 53.2 (L) 12/04/2023    LDLCALC 58.6 (L) 08/30/2022    LDLCALC 54.0 (L) 08/18/2021    CHOLHDL 60.9 (H) 12/04/2023    CHOLHDL 57.5 (H) 08/30/2022    CHOLHDL 58.6 (H) 08/18/2021    NONHDLCHOL 63 12/04/2023    NONHDLCHOL 71 08/30/2022    NONHDLCHOL 67 08/18/2021    TOTALCHOLEST 1.6 (L) 12/04/2023    TOTALCHOLEST 1.7 (L) 08/30/2022    TOTALCHOLEST 1.7 (L) 08/18/2021     TSH:  Lab Results   Component Value Date    TSH 1.205 05/26/2023     A1C:  No results found for: "HGBA1C"      Assessment/Plan     Jessenia Da Silva is a 83 y.o.female with:    POCT covid: negative  POCT influenza: negative    1. Wheezing without diagnosis of asthma    2. Congestion of respiratory tract  -     POCT COVID-19 Rapid Screening  -     POCT Influenza A/B Rapid Antigen  -     albuterol (VENTOLIN HFA) 90 mcg/actuation inhaler; Inhale 2 puffs into the lungs every 6 (six) hours as needed for Wheezing. Rescue  Dispense: 18 g; Refill: 0    Symptomatic treatment:  PLAIN mucinex 1200 mg twice a day  Tylenol/Motrin for pain/fever  Flonase for nasal passages-continue use  Chloraseptic spray  Throat " lozenges  Hot lemon/honey and/or tea-local honey is best for cough  Gargle warm salt water   Antihistamine daily-continue Xyzal  Saline nasal spray  Drink plenty water, fluids for rehydration, pedialyte    I spent 45 minutes on the day of this encounter for preparing for, evaluating, treating, and managing this patient.      -Continue current medications and maintain follow up with specialists.  Return to clinic in Follow up if symptoms worsen or fail to improve.       Lissette Jacques NP  [facility] [place]    Portions of this note may have been generated using voice recognition software.  Please excuse any spelling/grammatical errors. Occasional wrong-word or sound-a-like substitutions may have also occurred due to the inherent limitations of voice recognition software. Please read the chart carefully and recognize, using context, where substitutions have occurred.

## 2024-01-29 ENCOUNTER — TELEPHONE (OUTPATIENT)
Dept: PRIMARY CARE CLINIC | Facility: CLINIC | Age: 84
End: 2024-01-29
Payer: MEDICARE

## 2024-01-29 NOTE — TELEPHONE ENCOUNTER
Lov 1/18/24  Pt is calling regarding the Albuterol rx you sent her on 1/18/24. She was wondering the reason for the rx. I read thru your note and informed her that wheezing was reported on the physical exam. Pt does not recall this. Also states that you told her that you seen on her record that this has been going on since 2022 possibly at a  visit.   She also asks how was she supposed to take the inhaler. I informed her that it was only to be used if needed.

## 2024-01-29 NOTE — TELEPHONE ENCOUNTER
----- Message from Alena Reinoso sent at 1/29/2024  2:57 PM CST -----  Contact: [phone]  1MEDICALADVICE     Patient is calling for Medical Advice regarding: albuterol (VENTOLIN HFA) 90 mcg/actuation inhaler    Would like response via Hello[employer]:  call    Comments:    Pt called to speak to the provider or nurse in reference to the above medication. Did not want to give specifics. Please Advise

## 2024-02-05 ENCOUNTER — OFFICE VISIT (OUTPATIENT)
Dept: URGENT CARE | Facility: CLINIC | Age: 84
End: 2024-02-05
Payer: MEDICARE

## 2024-02-05 VITALS
RESPIRATION RATE: 16 BRPM | HEIGHT: 67 IN | SYSTOLIC BLOOD PRESSURE: 105 MMHG | DIASTOLIC BLOOD PRESSURE: 66 MMHG | OXYGEN SATURATION: 98 % | BODY MASS INDEX: 18.99 KG/M2 | HEART RATE: 82 BPM | TEMPERATURE: 99 F | WEIGHT: 121 LBS

## 2024-02-05 DIAGNOSIS — S62.024A CLOSED NONDISPLACED FRACTURE OF MIDDLE THIRD OF SCAPHOID BONE OF RIGHT WRIST, INITIAL ENCOUNTER: Primary | ICD-10-CM

## 2024-02-05 DIAGNOSIS — S49.91XA ARM INJURY, RIGHT, INITIAL ENCOUNTER: ICD-10-CM

## 2024-02-05 PROCEDURE — 99214 OFFICE O/P EST MOD 30 MIN: CPT | Mod: S$GLB,,, | Performed by: FAMILY MEDICINE

## 2024-02-05 PROCEDURE — 73090 X-RAY EXAM OF FOREARM: CPT | Mod: RT,S$GLB,, | Performed by: RADIOLOGY

## 2024-02-05 PROCEDURE — 73130 X-RAY EXAM OF HAND: CPT | Mod: RT,S$GLB,, | Performed by: RADIOLOGY

## 2024-02-05 PROCEDURE — 73110 X-RAY EXAM OF WRIST: CPT | Mod: RT,S$GLB,, | Performed by: RADIOLOGY

## 2024-02-05 NOTE — PROGRESS NOTES
"Subjective:      Patient ID: Jessenia Da Silva is a 84 y.o. female.    Vitals:  height is 5' 7" (1.702 m) and weight is 54.9 kg (121 lb). Her oral temperature is 98.6 °F (37 °C). Her blood pressure is 105/66 and her pulse is 82. Her respiration is 16 and oxygen saturation is 98%.     Chief Complaint: Wrist Injury    84 y.o female c/o right wrist and right arm pain started yesterday. Patient reports that she fall and landed on right hand. Patient states that she has pain with movement in right fingers.     Wrist Injury   The incident occurred 12 to 24 hours ago. The incident occurred in the street. The injury mechanism was a fall. The pain is present in the right fingers, right hand, right wrist and right forearm. The quality of the pain is described as aching. The pain does not radiate. The pain is at a severity of 7/10. The pain is moderate. Pertinent negatives include no chest pain, muscle weakness, numbness or tingling. The symptoms are aggravated by movement. She has tried nothing for the symptoms. The treatment provided no relief.       Cardiovascular:  Negative for chest pain.   Neurological:  Negative for numbness.      Objective:     Physical Exam   Constitutional: She is oriented to person, place, and time. She appears well-developed.  Non-toxic appearance. She does not appear ill. No distress.      Comments:GOOD SPIRITS.     HENT:   Head: Normocephalic and atraumatic.   Ears:   Right Ear: External ear normal.   Left Ear: External ear normal.   Pulmonary/Chest: Effort normal. No stridor. No respiratory distress. She has no wheezes. She has no rhonchi. She has no rales.   Musculoskeletal:      Comments: Swelling noted over right wrist and point of max tenderness is over right navicular bone.  Decreased  due to pain.   Neurological: She is alert and oriented to person, place, and time.   Skin: Skin is not diaphoretic.   Psychiatric: Her behavior is normal. Thought content normal.   Nursing note and " vitals reviewed.    XRAY:  There is a suggestion of a nondisplaced fracture involving the mid aspect of the navicular bone.  No other fractures or dislocation.  Mild DJD.       1. Closed nondisplaced fracture of middle third of scaphoid bone of right wrist, initial encounter    2. Arm injury, right, initial encounter        Plan:       Closed nondisplaced fracture of middle third of scaphoid bone of right wrist, initial encounter  -     Ambulatory referral/consult to Hand Surgery    Arm injury, right, initial encounter  -     XR HAND COMPLETE 3 VIEW RIGHT; Future; Expected date: 02/05/2024  -     XR WRIST COMPLETE 3 VIEWS RIGHT; Future; Expected date: 02/05/2024  -     XR FOREARM RIGHT; Future; Expected date: 02/05/2024    ICE 20 MINUTES EVERY HOUR AS NEEDED.    KEEP YOUR RIGHT WRIST ELEVATED ABOVE THE LEVEL OF YOUR HEART AS OFTEN AS POSSIBLE.    YOU CAN USE ACETAMINOPHEN (TYLENOL) 1000 MG TWICE DAILY.    I SUBMITTED A REFERRAL FOR YOU TO SEE THE HAND SPECIALIST.  THEY WILL BE IN TOUCH WITH YOU.    Make sure that you follow up with your primary care doctor in the next 2-5 days if needed .  Go to or return to Urgent Care if signs or symptoms change and certainly if you have worsening and/or severe symptoms go to the nearest emergency department for further evaluation.

## 2024-02-05 NOTE — PATIENT INSTRUCTIONS
ICE 20 MINUTES EVERY HOUR AS NEEDED.    KEEP YOUR RIGHT WRIST ELEVATED ABOVE THE LEVEL OF YOUR HEART AS OFTEN AS POSSIBLE.    YOU CAN USE ACETAMINOPHEN (TYLENOL) 1000 MG TWICE DAILY.    I SUBMITTED A REFERRAL FOR YOU TO SEE THE HAND SPECIALIST.  THEY WILL BE IN TOUCH WITH YOU.    Make sure that you follow up with your primary care doctor in the next 2-5 days if needed .  Go to or return to Urgent Care if signs or symptoms change and certainly if you have worsening and/or severe symptoms go to the nearest emergency department for further evaluation.

## 2024-02-08 ENCOUNTER — OFFICE VISIT (OUTPATIENT)
Dept: ORTHOPEDICS | Facility: CLINIC | Age: 84
End: 2024-02-08
Payer: MEDICARE

## 2024-02-08 VITALS — HEIGHT: 67 IN | BODY MASS INDEX: 19 KG/M2 | WEIGHT: 121.06 LBS

## 2024-02-08 DIAGNOSIS — S62.024A CLOSED NONDISPLACED FRACTURE OF MIDDLE THIRD OF SCAPHOID BONE OF RIGHT WRIST, INITIAL ENCOUNTER: Primary | ICD-10-CM

## 2024-02-08 PROCEDURE — 99999 PR PBB SHADOW E&M-EST. PATIENT-LVL III: CPT | Mod: PBBFAC,,, | Performed by: SPECIALIST/TECHNOLOGIST

## 2024-02-08 PROCEDURE — 99204 OFFICE O/P NEW MOD 45 MIN: CPT | Mod: S$GLB,,, | Performed by: SPECIALIST/TECHNOLOGIST

## 2024-02-08 NOTE — PROGRESS NOTES
"Subjective:       Patient ID: Jessenia Da Silva is a 84 y.o. female.    Chief Complaint: Pain of the Right Hand      2024   Patient reports today status post fall involving a outstretched hand.  She states the right wrist took the blunt of her fall when she was on uneven concrete getting the mail.  The fall sustained on 2024.  She states she went to the emergency department and showed an x-ray of her scaphoid.  She is placed in a thumb spica brace and referred to our clinic for further evaluation.  She notes her pain has improved today.  But does note discomfort when she moves her thumb.  She states she lives alone and is independent.  She denies any numbness or tingling.      Past Medical History:   Diagnosis Date    Bilateral carotid artery disease     Cervical spondylosis     Hypertension     Osteopenia     TIA (transient ischemic attack)      Past Surgical History:   Procedure Laterality Date    CATARACT EXTRACTION W/ INTRAOCULAR LENS  IMPLANT, BILATERAL      HYSTERECTOMY      TONSILLECTOMY       Family History   Problem Relation Age of Onset    Dementia Mother     Hypertension Father     Pancreatic cancer Sister     Breast cancer Sister      Social History     Socioeconomic History    Marital status:     Number of children: 1   Tobacco Use    Smoking status: Former     Current packs/day: 0.00     Types: Cigarettes     Quit date: 2023     Years since quittin.9    Smokeless tobacco: Never   Substance and Sexual Activity    Alcohol use: Yes     Comment: 2 "ponies" (sm beer) nightly.    Drug use: No    Sexual activity: Not Currently   Social History Narrative    , lives alone, drives. 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        Current Outpatient Medications   Medication Sig Dispense Refill    albuterol (VENTOLIN HFA) 90 mcg/actuation inhaler Inhale 2 puffs into the lungs every 6 (six) hours as needed for Wheezing. " "Rescue 18 g 0    amLODIPine (NORVASC) 5 MG tablet Take 1 tablet (5 mg total) by mouth once daily. 90 tablet 1    clopidogreL (PLAVIX) 75 mg tablet Take 1 tablet (75 mg total) by mouth once daily. 90 tablet 1    ergocalciferol (ERGOCALCIFEROL) 50,000 unit Cap Take 1 capsule (50,000 Units total) by mouth every 7 days. 12 capsule 1    fluticasone propionate (FLONASE) 50 mcg/actuation nasal spray 2 sprays (100 mcg total) by Each Nostril route once daily. 16 g 5    levocetirizine (XYZAL) 5 MG tablet Take 1 tablet (5 mg total) by mouth daily as needed for Allergies. 90 tablet 0    pravastatin (PRAVACHOL) 10 MG tablet Take 1 tablet (10 mg total) by mouth once daily. 90 tablet 1     No current facility-administered medications for this visit.     Review of patient's allergies indicates:  No Known Allergies    Review of Systems        Objective:      Vitals:    02/08/24 1146   Weight: 54.9 kg (121 lb 0.5 oz)   Height: 5' 7" (1.702 m)     Physical Exam  Eyes:      General: No scleral icterus.  Cardiovascular:      Pulses:           Radial pulses are Normal on the right side and Normal on the left side.   Skin:     General: Skin is intact.   Neurological:      Mental Status: She is alert.   Psychiatric:         Mood and Affect: Mood and affect normal.       Hand/Wrist Musculoskeletal Exam    Inspection    Right      Erythema: none      Ecchymosis: none      Edema: mild      Deformity: none    Left      Erythema: none      Ecchymosis: none      Edema: none      Deformity: none    Palpation    Right      Wrist tenderness to palpation: radial snuffbox    Range of Motion    Right Hand      Right hand range of motion is normal.      Left Hand      Left hand range of motion is normal.         Neurovascular    Right       Radial pulse: normal      Capillary refill: brisk and <3 sec      Ulnar nerve sensory distribution: normal      Median nerve sensory distribution: normal      Superficial radial nerve sensory distribution: normal    " Left       Radial pulse: normal      Capillary refill: brisk and <3 sec      Ulnar nerve sensory distribution: normal      Median nerve sensory distribution: normal      Superficial radial nerve sensory distribution: normal    General    Scleral icterus: no    Labored breathing: no    Psychiatric: normal mood and affect    Neurological: alert    Skin: intact    Diagnostics Review: X-Ray: Reviewed     Personal interpretation is a mid waist fracture of the scaphoid    Assessment:       1. Closed nondisplaced fracture of middle third of scaphoid bone of right wrist, initial encounter        Plan:       Patient has a scaphoid fracture that is identified on x-ray.  We will order a CT to further identify displacement of the fracture.  We will place patient in a thumb spica ortho glass splint today.  She will follow up after CT scan.  Lifting restrictions were given to patient no lifting pushing pulling no more than a coffee cup.

## 2024-02-09 ENCOUNTER — HOSPITAL ENCOUNTER (OUTPATIENT)
Dept: RADIOLOGY | Facility: HOSPITAL | Age: 84
Discharge: HOME | End: 2024-02-09
Attending: SPECIALIST/TECHNOLOGIST
Payer: MEDICARE

## 2024-02-09 DIAGNOSIS — S62.024A CLOSED NONDISPLACED FRACTURE OF MIDDLE THIRD OF SCAPHOID BONE OF RIGHT WRIST, INITIAL ENCOUNTER: ICD-10-CM

## 2024-02-09 PROCEDURE — 73200 CT UPPER EXTREMITY W/O DYE: CPT | Mod: 26,RT,, | Performed by: INTERNAL MEDICINE

## 2024-02-09 PROCEDURE — 73200 CT UPPER EXTREMITY W/O DYE: CPT | Mod: TC,RT

## 2024-02-12 ENCOUNTER — TELEPHONE (OUTPATIENT)
Dept: PRIMARY CARE CLINIC | Facility: CLINIC | Age: 84
End: 2024-02-12
Payer: MEDICARE

## 2024-02-12 NOTE — TELEPHONE ENCOUNTER
Pt had  a fall she went to the ED to be evaluated she is now complaining of some slight rib pain she would like to have this evaluated in the clinic

## 2024-02-12 NOTE — TELEPHONE ENCOUNTER
----- Message from Dulce Valentin sent at 2/12/2024 11:44 AM CST -----  Contact: Pt  [phone]  Pt called in regards to speaking with the nurse she states she had a fall and she has some concerns please call and advise.

## 2024-02-14 ENCOUNTER — TELEPHONE (OUTPATIENT)
Dept: ORTHOPEDICS | Facility: CLINIC | Age: 84
End: 2024-02-14
Payer: MEDICARE

## 2024-02-14 NOTE — TELEPHONE ENCOUNTER
Spoke c pt. Confirmed appt location & time c Dr. Zambrano 02/20/24. Pt wrote down her appt time next week stating that she has so many appointments to keep straight. Pt expressed understanding & was thankful.

## 2024-02-15 ENCOUNTER — TELEPHONE (OUTPATIENT)
Dept: PRIMARY CARE CLINIC | Facility: CLINIC | Age: 84
End: 2024-02-15
Payer: MEDICARE

## 2024-02-15 ENCOUNTER — OFFICE VISIT (OUTPATIENT)
Dept: PRIMARY CARE CLINIC | Facility: CLINIC | Age: 84
End: 2024-02-15
Payer: MEDICARE

## 2024-02-15 VITALS
OXYGEN SATURATION: 98 % | WEIGHT: 123.5 LBS | HEART RATE: 95 BPM | TEMPERATURE: 98 F | BODY MASS INDEX: 19.38 KG/M2 | DIASTOLIC BLOOD PRESSURE: 60 MMHG | HEIGHT: 67 IN | SYSTOLIC BLOOD PRESSURE: 114 MMHG

## 2024-02-15 DIAGNOSIS — I11.9 HYPERTENSIVE HEART DISEASE WITHOUT HEART FAILURE: ICD-10-CM

## 2024-02-15 DIAGNOSIS — J44.9 CHRONIC OBSTRUCTIVE PULMONARY DISEASE, UNSPECIFIED COPD TYPE: ICD-10-CM

## 2024-02-15 DIAGNOSIS — I70.0 THORACIC AORTA ATHEROSCLEROSIS: ICD-10-CM

## 2024-02-15 DIAGNOSIS — R07.89 RIGHT-SIDED CHEST WALL PAIN: Primary | ICD-10-CM

## 2024-02-15 PROCEDURE — 99999 PR PBB SHADOW E&M-EST. PATIENT-LVL III: CPT | Mod: PBBFAC,,, | Performed by: INTERNAL MEDICINE

## 2024-02-15 PROCEDURE — 99214 OFFICE O/P EST MOD 30 MIN: CPT | Mod: S$GLB,,, | Performed by: INTERNAL MEDICINE

## 2024-02-15 NOTE — TELEPHONE ENCOUNTER
----- Message from Jay Montes sent at 2/12/2024  3:07 PM CST -----  Contact: [phone]@[email or web address]  Good afternoon patient would like a call back from the nurse. Patient says she was on the phone with the nurse and got disconnected. Please give patient a call back [phone]

## 2024-02-15 NOTE — PROGRESS NOTES
Subjective     Patient ID: Jessenia Da Silva is a 84 y.o. female.    Chief Complaint: Fall and Rib Injury    Patient known to me, last seen a little over two months ago, presents for a same day appt c/o right rib pain. Had a fall on 24 outdoors - fell forward onto concrete, catching herself with her right hand. Did not hit head, no LOC, was alert and oriented throughout. Sustained injury to right hand and wrist for which she went to urgent care the following day. Had a laceration on palm of right hand, and pain in the wrist at the base of the thumb. No fracture on x-rays. Had follow up with Orthopedics, no fracture on CT scan of the wrist. She'd been wearing a large bulky brace on right forearm, and developed pain in right anterolateral rib cage which she now thinks was due to laying on the brace - her rib pain has subsided since having this brace removed. Currently has a cast on the wrist which is much smaller and doesn't press against her ribs.     PMH:   Hypertension with concentric remodeling on echo , normal LV function.   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   Cervical spondylosis.  Mild Bilateral Carotid Artery Disease - minimal on ultrasound in Cardiology . LDL 53 Dec. '23.  Osteopenia.   COPD, mild on PFT's .  Lung nodules <6mm, and 2 cm left adrenal nodule on CT .   Hyperbilirubinemia, mild, chronic.   Hearing loss.    PSH: Tonsillectomy. Hysterectomy and BSO. Lasik right eye. Bilateral Cataract extraction with lens implants.    Mammogram normal  - declines repeat. BMD stable . EKG normal 3/18. CXR clear . Colonoscopy normal >10 yrs ago. Eye exam  Dr. Hill. Vaccines reviewed.     Social: smoking, 2 sm beers nightly. , adult son lives locally - he is a heart and kidney transplant recipient. Retired consumer credit counselor.     FMH: HTN in father, dementia in mother,  "pancreatic cancer in sister.     NKDA.     Medications: list reviewed and reconciled.     Review of Systems   Constitutional:  Negative for fatigue and fever.   Respiratory:  Negative for cough, chest tightness and shortness of breath.    Cardiovascular:  Negative for chest pain, palpitations and leg swelling.   Gastrointestinal:  Negative for abdominal pain, nausea and vomiting.   Integumentary:  Negative for rash and wound.          Objective   Vitals:    02/15/24 1531   BP: 114/60   Pulse: 95   Temp: 97.5 °F (36.4 °C)   SpO2: 98%   Weight: 56 kg (123 lb 8 oz)   Height: 5' 7" (1.702 m)      Physical Exam  Constitutional:       General: She is not in acute distress.     Appearance: She is not ill-appearing.   Cardiovascular:      Rate and Rhythm: Normal rate and regular rhythm.   Pulmonary:      Effort: Pulmonary effort is normal. No respiratory distress.      Breath sounds: No decreased breath sounds.   Chest:      Chest wall: No lacerations, deformity, swelling, tenderness or crepitus.   Musculoskeletal:         General: Normal range of motion.      Comments: Right wrist and thumb in cast to proximal forearm, capillary refill intact distally.   Skin:     General: Skin is warm and dry.      Comments: Laceration on palm of right hand has healed with no evidence of infection.   Neurological:      Mental Status: She is alert.      Motor: No weakness.      Gait: Gait normal.        Assessment and Plan     1. Right-sided chest wall pain         -  onset days after the fall, no evidence of bony injury, resolved; imaging deferred.     2. Hypertensive heart disease without heart failure         -  controlled, continue Amlodipine.     3. Chronic obstructive pulmonary disease, unspecified COPD type         -  stable; Albuterol prn.     4. Thoracic aorta atherosclerosis         -  continue Pravastatin daily.        No follow-ups on file.      "

## 2024-02-20 ENCOUNTER — OFFICE VISIT (OUTPATIENT)
Dept: ORTHOPEDICS | Facility: CLINIC | Age: 84
End: 2024-02-20
Payer: MEDICARE

## 2024-02-20 DIAGNOSIS — M79.641 RIGHT HAND PAIN: Primary | ICD-10-CM

## 2024-02-20 DIAGNOSIS — S62.024A CLOSED NONDISPLACED FRACTURE OF MIDDLE THIRD OF SCAPHOID BONE OF RIGHT WRIST, INITIAL ENCOUNTER: Primary | ICD-10-CM

## 2024-02-20 PROCEDURE — 99214 OFFICE O/P EST MOD 30 MIN: CPT | Mod: S$GLB,,, | Performed by: ORTHOPAEDIC SURGERY

## 2024-02-20 PROCEDURE — 99999 PR PBB SHADOW E&M-EST. PATIENT-LVL II: CPT | Mod: PBBFAC,,, | Performed by: ORTHOPAEDIC SURGERY

## 2024-02-20 NOTE — PROGRESS NOTES
"Subjective:       Patient ID: Jessenia Da Silva is a 84 y.o. female.    Chief Complaint: Pain of the Right Wrist         Patient reports today status post fall involving a outstretched hand.  She states the right wrist took the blunt of her fall when she was on uneven concrete getting the mail.  The fall sustained on 2024.  She states she went to the emergency department and showed an x-ray of her scaphoid.  She is placed in a thumb spica brace and referred to our clinic for further evaluation.  She notes her pain has improved today.  But does note discomfort when she moves her thumb.  She states she lives alone and is independent.  She denies any numbness or tingling.    24 Patient presents to the clinic for a follow up of her right closed nondisplaced fracture of the middle third of her her scaphoid. DOI was 24 when she fell on an out stretched arm.   She presents  in a thumb spica from her last visit. She is here to review her CT scan. She states she is feeling much better.     Past Medical History:   Diagnosis Date    Bilateral carotid artery disease     Cervical spondylosis     Hypertension     Osteopenia     TIA (transient ischemic attack)      Past Surgical History:   Procedure Laterality Date    CATARACT EXTRACTION W/ INTRAOCULAR LENS  IMPLANT, BILATERAL      HYSTERECTOMY      TONSILLECTOMY       Family History   Problem Relation Age of Onset    Dementia Mother     Hypertension Father     Pancreatic cancer Sister     Breast cancer Sister      Social History     Socioeconomic History    Marital status:     Number of children: 1   Tobacco Use    Smoking status: Former     Current packs/day: 0.00     Types: Cigarettes     Quit date: 2023     Years since quittin.9    Smokeless tobacco: Never   Substance and Sexual Activity    Alcohol use: Yes     Comment: 2 "ponies" (sm beer) nightly.    Drug use: No    Sexual activity: Not Currently   Social History Narrative    , lives " alone, drives. 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        Current Outpatient Medications   Medication Sig Dispense Refill    albuterol (VENTOLIN HFA) 90 mcg/actuation inhaler Inhale 2 puffs into the lungs every 6 (six) hours as needed for Wheezing. Rescue 18 g 0    amLODIPine (NORVASC) 5 MG tablet Take 1 tablet (5 mg total) by mouth once daily. 90 tablet 1    clopidogreL (PLAVIX) 75 mg tablet Take 1 tablet (75 mg total) by mouth once daily. 90 tablet 1    ergocalciferol (ERGOCALCIFEROL) 50,000 unit Cap Take 1 capsule (50,000 Units total) by mouth every 7 days. 12 capsule 1    fluticasone propionate (FLONASE) 50 mcg/actuation nasal spray 2 sprays (100 mcg total) by Each Nostril route once daily. 16 g 5    levocetirizine (XYZAL) 5 MG tablet Take 1 tablet (5 mg total) by mouth daily as needed for Allergies. 90 tablet 0    pravastatin (PRAVACHOL) 10 MG tablet Take 1 tablet (10 mg total) by mouth once daily. 90 tablet 1     No current facility-administered medications for this visit.     Review of patient's allergies indicates:  No Known Allergies    Review of Systems        Objective:      There were no vitals filed for this visit.    Physical Exam  Eyes:      General: No scleral icterus.  Cardiovascular:      Pulses:           Radial pulses are Normal on the right side and Normal on the left side.   Skin:     General: Skin is intact.   Neurological:      Mental Status: She is alert.   Psychiatric:         Mood and Affect: Mood and affect normal.       Hand/Wrist Musculoskeletal Exam    Inspection    Right      Erythema: none      Ecchymosis: none      Edema: mild      Deformity: none    Left      Erythema: none      Ecchymosis: none      Edema: none      Deformity: none    Palpation    Right      Wrist tenderness to palpation: radial snuffbox    Range of Motion    Right Hand      Right hand range of motion is normal.      Left Hand      Left hand range of  motion is normal.         Neurovascular    Right       Radial pulse: normal      Capillary refill: brisk and <3 sec      Ulnar nerve sensory distribution: normal      Median nerve sensory distribution: normal      Superficial radial nerve sensory distribution: normal    Left       Radial pulse: normal      Capillary refill: brisk and <3 sec      Ulnar nerve sensory distribution: normal      Median nerve sensory distribution: normal      Superficial radial nerve sensory distribution: normal    General    Scleral icterus: no    Labored breathing: no    Psychiatric: normal mood and affect    Neurological: alert    Skin: intact    Diagnostics Review: X-Ray: Reviewed       CT scan 2/9/24   No fracture. If there is still clinical concern, consider MRI or follow-up radiographs   Assessment:       No diagnosis found.  84 yr oldwith wrist pain after fall    Plan:   Placed in a thumb spica cast   RTC in 2 weeks   Repeat xrays         Roshni Conde ATC,OTC  Clinical/OR Assistant to Rayna Da Silva MD  [facility]

## 2024-03-05 ENCOUNTER — OFFICE VISIT (OUTPATIENT)
Dept: ORTHOPEDICS | Facility: CLINIC | Age: 84
End: 2024-03-05
Payer: MEDICARE

## 2024-03-05 ENCOUNTER — HOSPITAL ENCOUNTER (OUTPATIENT)
Dept: RADIOLOGY | Facility: OTHER | Age: 84
Discharge: HOME | End: 2024-03-05
Attending: ORTHOPAEDIC SURGERY
Payer: MEDICARE

## 2024-03-05 VITALS — BODY MASS INDEX: 19.37 KG/M2 | WEIGHT: 123.44 LBS | HEIGHT: 67 IN

## 2024-03-05 DIAGNOSIS — S62.024A CLOSED NONDISPLACED FRACTURE OF MIDDLE THIRD OF SCAPHOID BONE OF RIGHT WRIST, INITIAL ENCOUNTER: Primary | ICD-10-CM

## 2024-03-05 DIAGNOSIS — M79.641 RIGHT HAND PAIN: ICD-10-CM

## 2024-03-05 PROCEDURE — 73130 X-RAY EXAM OF HAND: CPT | Mod: 26,RT,, | Performed by: RADIOLOGY

## 2024-03-05 PROCEDURE — 99999 PR PBB SHADOW E&M-EST. PATIENT-LVL III: CPT | Mod: PBBFAC,,, | Performed by: ORTHOPAEDIC SURGERY

## 2024-03-05 PROCEDURE — 99214 OFFICE O/P EST MOD 30 MIN: CPT | Mod: S$GLB,,, | Performed by: ORTHOPAEDIC SURGERY

## 2024-03-05 PROCEDURE — 73130 X-RAY EXAM OF HAND: CPT | Mod: TC,FY,RT

## 2024-03-05 NOTE — PROGRESS NOTES
Subjective:       Patient ID: Jessenia Da Silva is a 84 y.o. female.    Chief Complaint: Pain and Hand Pain of the Right Wrist         Patient reports today status post fall involving a outstretched hand.  She states the right wrist took the blunt of her fall when she was on uneven concrete getting the mail.  The fall sustained on 2024.  She states she went to the emergency department and showed an x-ray of her scaphoid.  She is placed in a thumb spica brace and referred to our clinic for further evaluation.  She notes her pain has improved today.  But does note discomfort when she moves her thumb.  She states she lives alone and is independent.  She denies any numbness or tingling.    24 Patient presents to the clinic for a follow up of her right closed nondisplaced fracture of the middle third of her her scaphoid. DOI was 24 when she fell on an out stretched arm.   She presents  in a thumb spica from her last visit. She is here to review her CT scan. She states she is feeling much better.     3/5/24 Patient presents today in a thumb spica cast for a 2 week f/u of her nondisplaced scaphoid fracture of her right wrist. Repeat xrays were taken today     Past Medical History:   Diagnosis Date    Bilateral carotid artery disease     Cervical spondylosis     Hypertension     Osteopenia     TIA (transient ischemic attack)      Past Surgical History:   Procedure Laterality Date    CATARACT EXTRACTION W/ INTRAOCULAR LENS  IMPLANT, BILATERAL      HYSTERECTOMY      TONSILLECTOMY       Family History   Problem Relation Age of Onset    Dementia Mother     Hypertension Father     Pancreatic cancer Sister     Breast cancer Sister      Social History     Socioeconomic History    Marital status:     Number of children: 1   Tobacco Use    Smoking status: Former     Current packs/day: 0.00     Types: Cigarettes     Quit date: 2023     Years since quittin.0    Smokeless tobacco: Never   Substance and  "Sexual Activity    Alcohol use: Yes     Comment: 2 "ponies" (sm beer) nightly.    Drug use: No    Sexual activity: Not Currently   Social History Narrative    , lives alone, drives. 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        Current Outpatient Medications   Medication Sig Dispense Refill    albuterol (VENTOLIN HFA) 90 mcg/actuation inhaler Inhale 2 puffs into the lungs every 6 (six) hours as needed for Wheezing. Rescue 18 g 0    amLODIPine (NORVASC) 5 MG tablet Take 1 tablet (5 mg total) by mouth once daily. 90 tablet 1    clopidogreL (PLAVIX) 75 mg tablet Take 1 tablet (75 mg total) by mouth once daily. 90 tablet 1    ergocalciferol (ERGOCALCIFEROL) 50,000 unit Cap Take 1 capsule (50,000 Units total) by mouth every 7 days. 12 capsule 1    fluticasone propionate (FLONASE) 50 mcg/actuation nasal spray 2 sprays (100 mcg total) by Each Nostril route once daily. 16 g 5    levocetirizine (XYZAL) 5 MG tablet Take 1 tablet (5 mg total) by mouth daily as needed for Allergies. 90 tablet 0    pravastatin (PRAVACHOL) 10 MG tablet Take 1 tablet (10 mg total) by mouth once daily. 90 tablet 1     No current facility-administered medications for this visit.     Review of patient's allergies indicates:  No Known Allergies    Review of Systems        Objective:      Vitals:    03/05/24 1057   Weight: 56 kg (123 lb 7.3 oz)   Height: 5' 7" (1.702 m)       Physical Exam  Eyes:      General: No scleral icterus.  Cardiovascular:      Pulses:           Radial pulses are Normal on the right side and Normal on the left side.   Skin:     General: Skin is intact.   Neurological:      Mental Status: She is alert.   Psychiatric:         Mood and Affect: Mood and affect normal.       Hand/Wrist Musculoskeletal Exam    Inspection    Right      Erythema: none      Ecchymosis: none      Edema: mild      Deformity: none    Left      Erythema: none      Ecchymosis: none      " Edema: none      Deformity: none    Palpation    Right      Wrist tenderness to palpation: radial snuffbox    Range of Motion    Right Hand      Right hand range of motion is normal.      Left Hand      Left hand range of motion is normal.         Neurovascular    Right       Radial pulse: normal      Capillary refill: brisk and <3 sec      Ulnar nerve sensory distribution: normal      Median nerve sensory distribution: normal      Superficial radial nerve sensory distribution: normal    Left       Radial pulse: normal      Capillary refill: brisk and <3 sec      Ulnar nerve sensory distribution: normal      Median nerve sensory distribution: normal      Superficial radial nerve sensory distribution: normal    General    Scleral icterus: no    Labored breathing: no    Psychiatric: normal mood and affect    Neurological: alert    Skin: intact    Diagnostics Review: X-Ray: Reviewed       CT scan 2/9/24   No fracture. If there is still clinical concern, consider MRI or follow-up radiographs   Assessment:       No diagnosis found.  84 yr old with a nondisplaced fracture of the middle third of the scaphoid bone    Plan:     RTC in 2 weeks Cast off with repeat xray transition to brace    New cast placed today    Roshni Conde ATC,OTC  Clinical/OR Assistant to Rayna Da Silva MD  [facility]

## 2024-03-13 ENCOUNTER — TELEPHONE (OUTPATIENT)
Dept: ORTHOPEDICS | Facility: CLINIC | Age: 84
End: 2024-03-13
Payer: MEDICARE

## 2024-03-13 NOTE — TELEPHONE ENCOUNTER
Spoke c pt. Confirmed appt with consuelo Zambrano. Patient expressed understanding & was thankful.

## 2024-03-19 ENCOUNTER — HOSPITAL ENCOUNTER (OUTPATIENT)
Dept: RADIOLOGY | Facility: OTHER | Age: 84
Discharge: HOME | End: 2024-03-19
Attending: ORTHOPAEDIC SURGERY
Payer: MEDICARE

## 2024-03-19 ENCOUNTER — OFFICE VISIT (OUTPATIENT)
Dept: ORTHOPEDICS | Facility: CLINIC | Age: 84
End: 2024-03-19
Payer: MEDICARE

## 2024-03-19 ENCOUNTER — TELEPHONE (OUTPATIENT)
Dept: ORTHOPEDICS | Facility: CLINIC | Age: 84
End: 2024-03-19
Payer: MEDICARE

## 2024-03-19 VITALS — WEIGHT: 123.44 LBS | HEIGHT: 67 IN | BODY MASS INDEX: 19.37 KG/M2

## 2024-03-19 DIAGNOSIS — S62.024A CLOSED NONDISPLACED FRACTURE OF MIDDLE THIRD OF SCAPHOID BONE OF RIGHT WRIST, INITIAL ENCOUNTER: ICD-10-CM

## 2024-03-19 DIAGNOSIS — M79.641 RIGHT HAND PAIN: ICD-10-CM

## 2024-03-19 DIAGNOSIS — S62.024A CLOSED NONDISPLACED FRACTURE OF MIDDLE THIRD OF SCAPHOID BONE OF RIGHT WRIST, INITIAL ENCOUNTER: Primary | ICD-10-CM

## 2024-03-19 DIAGNOSIS — R52 PAIN: Primary | ICD-10-CM

## 2024-03-19 PROCEDURE — 99999 PR PBB SHADOW E&M-EST. PATIENT-LVL III: CPT | Mod: PBBFAC,,, | Performed by: ORTHOPAEDIC SURGERY

## 2024-03-19 PROCEDURE — 73130 X-RAY EXAM OF HAND: CPT | Mod: 26,RT,, | Performed by: RADIOLOGY

## 2024-03-19 PROCEDURE — 73130 X-RAY EXAM OF HAND: CPT | Mod: TC,FY,RT

## 2024-03-19 PROCEDURE — 99214 OFFICE O/P EST MOD 30 MIN: CPT | Mod: S$GLB,,, | Performed by: ORTHOPAEDIC SURGERY

## 2024-03-19 NOTE — PROGRESS NOTES
Subjective:       Patient ID: Jessenia Da Silva is a 84 y.o. female.    Chief Complaint: Pain of the Right Wrist  Interval HPI 03/19/2024  Patient is here for a follow-up for right right closed nondisplaced fracture of middle 3rd of her scaphoid.  Reports mild discomfort to her right snuffbox.  She denies any pain.  Reports she has stiffness in her right hand and wrist.  Overall reports she is doing well.  She denies any numbness or tingling.  She has been in a thumb spica cast since 2/20/2024.      Patient reports today status post fall involving a outstretched hand.  She states the right wrist took the blunt of her fall when she was on uneven concrete getting the mail.  The fall sustained on 02/04/2024.  She states she went to the emergency department and showed an x-ray of her scaphoid.  She is placed in a thumb spica brace and referred to our clinic for further evaluation.  She notes her pain has improved today.  But does note discomfort when she moves her thumb.  She states she lives alone and is independent.  She denies any numbness or tingling.    2/20/24 Patient presents to the clinic for a follow up of her right closed nondisplaced fracture of the middle third of her her scaphoid. DOI was 2/4/24 when she fell on an out stretched arm.   She presents  in a thumb spica from her last visit. She is here to review her CT scan. She states she is feeling much better.     3/5/24 Patient presents today in a thumb spica cast for a 2 week f/u of her nondisplaced scaphoid fracture of her right wrist. Repeat xrays were taken today     Past Medical History:   Diagnosis Date    Bilateral carotid artery disease     Cervical spondylosis     Hypertension     Osteopenia     TIA (transient ischemic attack)      Past Surgical History:   Procedure Laterality Date    CATARACT EXTRACTION W/ INTRAOCULAR LENS  IMPLANT, BILATERAL      HYSTERECTOMY      TONSILLECTOMY       Family History   Problem Relation Age of Onset    Dementia  "Mother     Hypertension Father     Pancreatic cancer Sister     Breast cancer Sister      Social History     Socioeconomic History    Marital status:     Number of children: 1   Tobacco Use    Smoking status: Former     Current packs/day: 0.00     Types: Cigarettes     Quit date: 2023     Years since quittin.0    Smokeless tobacco: Never   Substance and Sexual Activity    Alcohol use: Yes     Comment: 2 "ponies" (sm beer) nightly.    Drug use: No    Sexual activity: Not Currently   Social History Narrative    , lives alone, drives. 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        Current Outpatient Medications   Medication Sig Dispense Refill    albuterol (VENTOLIN HFA) 90 mcg/actuation inhaler Inhale 2 puffs into the lungs every 6 (six) hours as needed for Wheezing. Rescue 18 g 0    amLODIPine (NORVASC) 5 MG tablet Take 1 tablet (5 mg total) by mouth once daily. 90 tablet 1    clopidogreL (PLAVIX) 75 mg tablet Take 1 tablet (75 mg total) by mouth once daily. 90 tablet 1    ergocalciferol (ERGOCALCIFEROL) 50,000 unit Cap Take 1 capsule (50,000 Units total) by mouth every 7 days. 12 capsule 1    fluticasone propionate (FLONASE) 50 mcg/actuation nasal spray 2 sprays (100 mcg total) by Each Nostril route once daily. 16 g 5    levocetirizine (XYZAL) 5 MG tablet Take 1 tablet (5 mg total) by mouth daily as needed for Allergies. 90 tablet 0    pravastatin (PRAVACHOL) 10 MG tablet Take 1 tablet (10 mg total) by mouth once daily. 90 tablet 1     No current facility-administered medications for this visit.     Review of patient's allergies indicates:  No Known Allergies    Review of Systems        Objective:      Vitals:    24 1030   Weight: 56 kg (123 lb 7.3 oz)   Height: 5' 7" (1.702 m)       Physical Exam  Eyes:      General: No scleral icterus.  Cardiovascular:      Pulses:           Radial pulses are Normal on the right side and " Normal on the left side.   Skin:     General: Skin is intact.   Neurological:      Mental Status: She is alert.   Psychiatric:         Mood and Affect: Mood and affect normal.       Hand/Wrist Musculoskeletal Exam    Inspection    Right      Erythema: none      Ecchymosis: none      Edema: mild      Deformity: none    Left      Erythema: none      Ecchymosis: none      Edema: none      Deformity: none    Palpation    Right      Wrist tenderness to palpation: radial snuffbox    Palpation additional comments: Patient able to make a full composite fist.     Range of Motion    Right Hand      Right hand range of motion is normal.      Left Hand      Left hand range of motion is normal.         Neurovascular    Right       Radial pulse: normal      Capillary refill: brisk and <3 sec      Ulnar nerve sensory distribution: normal      Median nerve sensory distribution: normal      Superficial radial nerve sensory distribution: normal    Left       Radial pulse: normal      Capillary refill: brisk and <3 sec      Ulnar nerve sensory distribution: normal      Median nerve sensory distribution: normal      Superficial radial nerve sensory distribution: normal    General    Scleral icterus: no    Labored breathing: no    Psychiatric: normal mood and affect    Neurological: alert    Skin: intact    Diagnostics Review: X-Ray: Reviewed     XRAY right hand 03/19/2024  FINDINGS:  Similar DJD.  Bony alignment unchanged.  No acute fracture, dislocation or osseous destruction.     Impression:     As above.     CT scan 2/9/24   No fracture. If there is still clinical concern, consider MRI or follow-up radiographs   Assessment:       No diagnosis found.  84 yr old with a nondisplaced fracture of the middle third of the scaphoid bone    Plan:   Jessenia was seen today for pain.    Diagnoses and all orders for this visit:    Closed nondisplaced fracture of middle third of scaphoid bone of right wrist, initial encounter  -     Bone  Stimulator for Home Use  -     Ambulatory referral/consult to Physical/Occupational Therapy; Future    Right hand pain  -     Ambulatory referral/consult to Physical/Occupational Therapy; Future         X-rays reviewed.  Right-hand nondisplaced fracture of scaphoid bone demonstrate healing.  Based off exam today, we will transition her into right wrist thumb spica brace use full-time and taken off during hygiene.    She will follow-up in 6 week with repeated x-ray with Fadi PIERCE   Use of bone stimulator was also discussed today to be used a few times a day to help promote bone healing.   Therapy was also ordered.   All questions answered.     Marcia Ojeda, THANG

## 2024-03-19 NOTE — TELEPHONE ENCOUNTER
Spoke c pt. Re: orders for bone stimulator and process for receipt of said device. Patient verbalized understanding and was thankful.

## 2024-03-21 NOTE — PROGRESS NOTES
Closed nondisplaced fracture of middle third of scaphoid bone of right wrist, initial encounter  -     Bone Stimulator for Home Use  -     Ambulatory referral/consult to Physical/Occupational Therapy; Future     Right hand pain  -     Ambulatory referral/consult to Physical/Occupational Therapy; Future           X-rays reviewed.  Right-hand nondisplaced fracture of scaphoid bone demonstrate healing.  Based off exam today, we will transition her into right wrist thumb spica brace use full-time and taken off during hygiene.    She will follow-up in 6 week with repeated x-ray with Fadi PIERCE   Use of bone stimulator was also discussed today to be used a few times a day to help promote bone healing.   Therapy was also ordered.   All questions answered.

## 2024-03-26 ENCOUNTER — DOCUMENTATION ONLY (OUTPATIENT)
Dept: ORTHOPEDICS | Facility: CLINIC | Age: 84
End: 2024-03-26
Payer: MEDICARE

## 2024-03-28 ENCOUNTER — CLINICAL SUPPORT (OUTPATIENT)
Dept: REHABILITATION | Facility: HOSPITAL | Age: 84
End: 2024-03-28
Payer: MEDICARE

## 2024-03-28 DIAGNOSIS — M79.641 RIGHT HAND PAIN: ICD-10-CM

## 2024-03-28 DIAGNOSIS — S62.024A CLOSED NONDISPLACED FRACTURE OF MIDDLE THIRD OF SCAPHOID BONE OF RIGHT WRIST, INITIAL ENCOUNTER: ICD-10-CM

## 2024-03-28 DIAGNOSIS — M25.531 RIGHT WRIST PAIN: Primary | ICD-10-CM

## 2024-03-28 PROCEDURE — 97165 OT EVAL LOW COMPLEX 30 MIN: CPT | Mod: PO

## 2024-03-28 PROCEDURE — 97110 THERAPEUTIC EXERCISES: CPT | Mod: PO

## 2024-03-28 NOTE — PATIENT INSTRUCTIONS
"[facility], OCCUPATIONAL THERAPY  HOME EXERCISE PROGRAM     Complete the following exercises for 10 repetitions, 2-3 x/day:     AROM: Supination / Pronation   With your elbow by your side, turn your   palm up then turn your palm down.  AROM: Wrist Flexion / Extension               Bend your wrist forward and back as far as possible.      AROM: Isolated MCP Flexion / Extension ("Wave")   Bend only your large, bottom knuckles. Hold 3 seconds.   Keep the tips of your fingers straight. Straighten fingers.  AROM: Isolated IPJ Flexion / Extension ("Hook")   Bend only your middle and end knuckles. Hold 3 seconds.   Straighten your fingers.       AROM: MCP and PIP Flexion / Extension ("Straight Fist")  Bend your bottom and middle knuckles, keeping the tips of your fingers straight.   Try to touch the pads of your fingers on your palm. Hold 3 seconds.   Straighten your fingers.       AROM: Composite Flexion / Extension ("Full Fist")  Bend every joint in your hand into a fist. Hold 3 seconds.   Straighten your fingers.     AROM: Composte Extension ("Finger Lifts")  Lift your finger off of the table one at a time. Hold 3 seconds.   Relax your finger.    AROM: Abduction / Adduction  With hand flat on table, spread all fingers apart,   then bring them together as close as possible.      AROM: Thumb IP Flexion / Extension  Brace thumb below tip joint. Bend joint as far as   possible then straighten.      AROM: Composite Flexion   Bend both joints of thumb as far as possible.   Try to touch base of little finger.      AROM: Radial Adduction / Abduction  Place your palm flat on the table. Move thumb out   to side. Move back alongside index finger.                                       AROM: Palmar Adduction / Abduction   Rest your small finger on the table. Move thumb   sideways, out and away from   palm. Move back to rest along palm.                        AROM: Opposition   Touch tip of thumb to nail tip of " "each  finger in turn, making an "O" shape.    AROM: Composite Movement Circumduction  Make clockwise circles with thumb. Reverse and make counterclockwise   circles with thumb.                       Therapist: GAYE Hoffmann       "

## 2024-03-28 NOTE — PROGRESS NOTES
[facility]  Occupational Therapy Initial Evaluation     Name: Jessenia Da Silva  [facility] Number: [id number]    Therapy Diagnosis:   Encounter Diagnoses   Name Primary?    Closed nondisplaced fracture of middle third of scaphoid bone of right wrist, initial encounter     Right hand pain     Right wrist pain Yes     Physician: Marcia Ojeda NP    Physician Orders: Eval and Treat  Medical Diagnosis: S62.024A (ICD-10-CM) - Closed nondisplaced fracture of middle third of scaphoid bone of right wrist, initial encounter M79.641 (ICD-10-CM) - Right hand pain  Surgical Procedure and Date: DOI: 2/4/24  Evaluation Date: 3/28/2024  Insurance Authorization Period Expiration: (3/19/2024-12/31/2024)   Plan of Care Certification Period: 12 weeks; 6/20/24  Date of Return to MD:   Visit # / Visits authorized: 1 / 1      Precautions:  Standard    Time In: 10:20  Time Out: 11: 15  Total Billable Time: 55 minutes    Subjective     Date of Onset: Fall 2/4/24    History of Current Condition/Mechanism of Injury: Jessenia reports: She fell [birth year] her [birth year] when walking on uneven sidewalk to her mailbox. She states she followed up with a doctor 2 days after the fall. She denies any numbness or tingling; mild pain reported with ROM.     Falls: at time of injury    Involved Side: Right  Dominant Side: Right    Mechanism of Injury: fall   Surgical Procedure: n/a  Imaging: bone scan films:  Nondisplaced fracture of middle third of navicular (scaphoid) bone of right wrist, initial encounter for closed fracture     Prior Therapy: N/a    Pain:  Functional Pain Scale Rating 0-10:   3/10 on average    6/10 at worst  Location: base of wrist near digits 1-3  Description: Aching, Dull, and Tight  Aggravating Factors: thumb ROM  Easing Factors:  warm water         Functional Limitations/Social History:    Previous functional status includes: Independent with all ADLs. I      Limitation of Functional Status as  "follows:   ADLs/IADLs:     - Feeding: I    - Bathing: I    - Dressing: I  - Grooming: I    - Home Management: Family assisting with more challenging duties, such as mopping, cleaning, cooking    - Driving: not at this time    Patient's Goals for Therapy: "get back to normal"    Past Medical History/Physical Systems Review:   Jessenia Da Silva  has a past medical history of Bilateral carotid artery disease, Cervical spondylosis, Hypertension, Osteopenia, and TIA (transient ischemic attack).    Jessenia Da Silva  has a past surgical history that includes Hysterectomy; Tonsillectomy; and Cataract extraction w/ intraocular lens  implant, bilateral.    Jessenia has a current medication list which includes the following prescription(s): albuterol, amlodipine, clopidogrel, ergocalciferol, fluticasone propionate, levocetirizine, and pravastatin.    Review of patient's allergies indicates:  No Known Allergies     Objective     Observation/Appearance:  Skin intact; minimal swelling      Elbow and Wrist ROM. Measured in degrees.   3/28/2024 3/28/2024    Left Right   Supination/Pronation WNL WNL   Wrist Ext/Flex 75/73 55/50!   Wrist RD/UD WNL WNL        Strength (Dyanmometer) and Pinch Strength (Pinch Gauge)  Measured in pounds and psi. Average of three trials.   3/28/2024 3/28/2024    Right  Left    Rung II NT NT   Key Pinch NT NT   3pt Pinch NT NT   2pt Pinch NT NT             Treatment     Total Treatment time separate from Evaluation time:10    Jessenia received the treatments listed below:      therapeutic exercises to develop ROM for 10 minutes including:  -TGEs  -Wrist AROM  manual therapy techniques: Joint mobilizations were applied to the: R hand for 8 minutes, including:  -light carpal mobs      hot pack for 10 minutes to R hand.      Patient Education and Home Exercises      Education provided:   -role of OT, goals for OT, scheduling/cancellations, insurance limitations with patient.  -Additional Education " provided: orthosis wear    Written Home Exercises Provided: Patient instructed to cont prior HEP.  Exercises were reviewed and Jessenia was able to demonstrate them prior to the end of the session.    Jessenia demonstrated good  understanding of the education provided.     Pt was advised to perform these exercises free of pain, and to stop performing them if pain occurs.    See EMR under Patient Instructions for exercises provided 3/28/2024.    Assessment     Jessenia Da Silva is a 84 y.o. female referred to outpatient occupational therapy and presents with a medical diagnosis of S62.024A (ICD-10-CM) - Closed nondisplaced fracture of middle third of scaphoid bone of right wrist, initial encounter M79.641 (ICD-10-CM) - Right hand pain.    Following medical record review it is determined that pt will benefit from occupational therapy services in order to maximize pain free and/or functional use of right hand. The following goals were discussed with the patient and patient is in agreement with them as to be addressed in the treatment plan. The patient's rehab potential is Good.     Anticipated barriers to occupational therapy: understanding and compliance to HEP    Plan of care discussed with patient: Yes  Patient's spiritual, cultural and educational needs considered and patient is agreeable to the plan of care and goals as stated below:     Medical Necessity is demonstrated by the following  Occupational Profile/History  Co-morbidities and personal factors that may impact the plan of care [x] LOW: Brief chart review  [] MODERATE: Expanded chart review   [] HIGH: Extensive chart review    Moderate / High Support Documentation: -     Examination  Performance deficits relating to physical, cognitive or psychosocial skills that result in activity limitations and/or participation restrictions  [x] LOW: addressing 1-3 Performance deficits  [] MODERATE: 3-5 Performance deficits  [] HIGH: 5+ Performance deficits (please  support below)    Moderate / High Support Documentation:    Physical:  Joint Mobility  Joint Stability  Muscle Power/Strength  Muscle Endurance  Edema   Strength  Pinch Strength  Gross Motor Coordination  Fine Motor Coordination  Postural Control  Pain    Cognitive:  No Deficits    Psychosocial:    No Deficits     Treatment Options [x] LOW: Limited options  [] MODERATE: Several options  [] HIGH: Multiple options      Decision Making/ Complexity Score: low       The following goals were discussed with the patient and patient is in agreement with them as to be addressed in the treatment plan.     Goals:   Short Term Goals: (in 4 weeks)  1) Patient will be independent in HEP  2) Decrease pain in R wrsit to no more than 3/10 worst in ADL/IADL's  3) Increase AROM in wrist flex/ext for improved functioning in ADL/IADL's  4) Assess  strength as appropriate      Long Term Goals: (by discharge)  1) Decrease pain in R wrist to no more than 1/10 worst in ADL/IADL's  2) Increase AROM of R wrist flex/ext to WFL for increased functioning in ADL/IADL's  3) Return to cooking without pain.      Plan   Certification Period/Plan of care expiration: 3/28/2024 to 6/20/24.    Outpatient Occupational Therapy 1-2 times weekly for 12 weeks to include the following interventions: Paraffin, Fluidotherapy, Manual therapy/joint mobilizations, Modalities for pain management, US 3 mhz, Therapeutic exercises/activities., Strengthening, Edema Control, Scar Management, Wound Care, Electrical Modalities, Joint Protection, and Energy Conservation.    Evelin Manrique OT        Physician's Signature: _________________________________________ Date: ________________

## 2024-04-04 ENCOUNTER — CLINICAL SUPPORT (OUTPATIENT)
Dept: REHABILITATION | Facility: HOSPITAL | Age: 84
End: 2024-04-04
Payer: MEDICARE

## 2024-04-04 DIAGNOSIS — M25.531 RIGHT WRIST PAIN: Primary | ICD-10-CM

## 2024-04-04 PROCEDURE — 97140 MANUAL THERAPY 1/> REGIONS: CPT | Mod: PO

## 2024-04-04 PROCEDURE — 97110 THERAPEUTIC EXERCISES: CPT | Mod: PO

## 2024-04-04 PROCEDURE — 97022 WHIRLPOOL THERAPY: CPT | Mod: PO

## 2024-04-04 NOTE — PROGRESS NOTES
[facility]  Occupational Therapy Treatment Note     Date: 4/4/2024  Name: Jessenia Da Silva  [facility] Number: [id number]    Therapy Diagnosis:   Encounter Diagnosis   Name Primary?    Right wrist pain Yes     Physician: Marcia Ojeda NP    Physician Orders: Eval and Treat  Medical Diagnosis: S62.024A (ICD-10-CM) - Closed nondisplaced fracture of middle third of scaphoid bone of right wrist, initial encounter M79.641 (ICD-10-CM) - Right hand pain  Surgical Procedure and Date: DOI: 2/4/24  Evaluation Date: 3/28/2024  Insurance Authorization Period Expiration: (3/19/2024-12/31/2024)   Plan of Care Certification Period: 12 weeks; 6/20/24  Date of Return to MD:   Visit # / Visits authorized: 1 / 1        Precautions:  Standard     Time In: 11:00  Time Out: 11:45  Total Billable Time: 45 minutes      Subjective     Patient reports: Thumb pain noted d/t orthosis discomfort and tightness.    She was compliant with home exercise program given last session.   Response to previous treatment: improving ROM      Pain: 2/10  Location: right hands      Objective     Objective Measures updated at progress report unless specified.  Observation/Appearance:  Skin intact; minimal swelling        Elbow and Wrist ROM. Measured in degrees.    3/28/2024 3/28/2024     Left Right   Supination/Pronation WNL WNL   Wrist Ext/Flex 75/73 55/50!   Wrist RD/UD WNL WNL          Strength (Dyanmometer) and Pinch Strength (Pinch Gauge)  Measured in pounds and psi. Average of three trials.    3/28/2024 3/28/2024     Right  Left    Rung II NT NT   Key Pinch NT NT   3pt Pinch NT NT   2pt Pinch NT NT       Treatment     Jessenia received the treatments listed below:      therapeutic exercises to develop endurance and ROM for 10 minutes including:  - Wrist ROM  - gentle isometrics     manual therapy techniques: Soft tissue Mobilization were applied to the: R wrist for 10 minutes, including:  Gentle carpal  gabo  IASTM    supervised modalities after being cleared for contradictions: Fluido      Patient Education and Home Exercises     Education provided:   - HEP  - Progress towards goals     Written Home Exercises Provided: Patient instructed to cont prior HEP.  Exercises were reviewed and Jessenia was able to demonstrate them prior to the end of the session.  Jessenia demonstrated good  understanding of the home exercise program provided. See electronic medical record under Patient Instructions for exercises provided during therapy sessions.       Assessment     Pt tolerates treatment fairly well focused on increased ROM and decreased pain. She responds well to IASTM and gentle mobilization. Orthosis adjust for improved comfort. Plan to progress as tolerated.     Jessenia is progressing well towards her goals and there are no updates to goals at this time. Pt prognosis is Good.     Patient will continue to benefit from skilled outpatient occupational therapy to address the deficits listed in the problem list on initial evaluation provide patient/family education and to maximize patient's level of independence in the home and community environment.     Patient's spiritual, cultural and educational needs considered and patient agreeable to plan of care and goals.    Anticipated barriers to occupational therapy: none      Goals:   Short Term Goals: (in 4 weeks)  1) Patient will be independent in HEP progressing, continue  2) Decrease pain in R wrsit to no more than 3/10 worst in ADL/IADL's progressing, continue  3) Increase AROM in wrist flex/ext for improved functioning in ADL/IADL'sprogressing, continue  4) Assess  strength as appropriateprogressing, continue        Long Term Goals: (by discharge)  1) Decrease pain in R wrist to no more than 1/10 worst in ADL/IADL'sprogressing, continue  2) Increase AROM of R wrist flex/ext to WFL for increased functioning in ADL/IADL'sprogressing, continue  3) Return to cooking  without pain.progressing, continue    Plan     Updates/Grading for next session: Continue OT POC    Evelin Manrique OT   4/4/2024

## 2024-04-05 ENCOUNTER — TELEPHONE (OUTPATIENT)
Dept: ORTHOPEDICS | Facility: CLINIC | Age: 84
End: 2024-04-05
Payer: MEDICARE

## 2024-04-15 ENCOUNTER — CLINICAL SUPPORT (OUTPATIENT)
Dept: REHABILITATION | Facility: HOSPITAL | Age: 84
End: 2024-04-15
Payer: MEDICARE

## 2024-04-15 DIAGNOSIS — M25.531 RIGHT WRIST PAIN: Primary | ICD-10-CM

## 2024-04-15 PROCEDURE — 97022 WHIRLPOOL THERAPY: CPT | Mod: PO

## 2024-04-15 PROCEDURE — 97140 MANUAL THERAPY 1/> REGIONS: CPT | Mod: PO

## 2024-04-15 PROCEDURE — 97110 THERAPEUTIC EXERCISES: CPT | Mod: PO

## 2024-04-15 NOTE — PROGRESS NOTES
[facility]  Occupational Therapy Treatment Note     Date: 4/15/2024  Name: Jessenia Da Silva  [facility] Number: [id number]    Therapy Diagnosis:   Encounter Diagnosis   Name Primary?    Right wrist pain Yes     Physician: Marcia Ojeda NP    Physician Orders: Eval and Treat  Medical Diagnosis: S62.024A (ICD-10-CM) - Closed nondisplaced fracture of middle third of scaphoid bone of right wrist, initial encounter M79.641 (ICD-10-CM) - Right hand pain  Surgical Procedure and Date: DOI: 2/4/24  Evaluation Date: 3/28/2024  Insurance Authorization Period Expiration: (3/19/2024-12/31/2024)   Plan of Care Certification Period: 12 weeks; 6/20/24  Date of Return to MD:   Visit # / Visits authorized: 1 / 1        Precautions:  Standard     Time In: 11:00  Time Out: 11:45  Total Billable Time: 45 minutes      Subjective     Patient reports: Decreased pain reported today; increased independence with daily activity; returned to driving.    She was compliant with home exercise program given last session.   Response to previous treatment: improving ROM      Pain: 2/10  Location: right hands      Objective     Objective Measures updated at progress report unless specified.  Observation/Appearance:  Skin intact; minimal swelling        Elbow and Wrist ROM. Measured in degrees.    3/28/2024 3/28/2024     Left Right   Supination/Pronation WNL WNL   Wrist Ext/Flex 75/73 55/50!   Wrist RD/UD WNL WNL          Strength (Dyanmometer) and Pinch Strength (Pinch Gauge)  Measured in pounds and psi. Average of three trials.    3/28/2024 3/28/2024     Right  Left    Rung II NT NT   Key Pinch NT NT   3pt Pinch NT NT   2pt Pinch NT NT       Treatment     Jessenia received the treatments listed below:      therapeutic exercises to develop endurance and ROM for 10 minutes including:  - Wrist ROM  - gentle isometrics   - flex bar isometrics (yellow)  - isospheres      manual therapy techniques: Soft tissue  Mobilization were applied to the: R wrist for 10 minutes, including:  Gentle carpal mobs  IASTM    supervised modalities after being cleared for contradictions: Fluido      Patient Education and Home Exercises     Education provided:   - HEP  - Progress towards goals     Written Home Exercises Provided: Patient instructed to cont prior HEP.  Exercises were reviewed and Jessenia was able to demonstrate them prior to the end of the session.  Jessenia demonstrated good  understanding of the home exercise program provided. See electronic medical record under Patient Instructions for exercises provided during therapy sessions.       Assessment     Pt tolerates treatment well with min complaints of increased pain or fatigue. Plan to progress light strengthening as tolerated.    Jessenia is progressing well towards her goals and there are no updates to goals at this time. Pt prognosis is Good.     Patient will continue to benefit from skilled outpatient occupational therapy to address the deficits listed in the problem list on initial evaluation provide patient/family education and to maximize patient's level of independence in the home and community environment.     Patient's spiritual, cultural and educational needs considered and patient agreeable to plan of care and goals.    Anticipated barriers to occupational therapy: none      Goals:   Short Term Goals: (in 4 weeks)  1) Patient will be independent in HEP progressing, continue  2) Decrease pain in R wrsit to no more than 3/10 worst in ADL/IADL's progressing, continue  3) Increase AROM in wrist flex/ext for improved functioning in ADL/IADL'sprogressing, continue  4) Assess  strength as appropriateprogressing, continue        Long Term Goals: (by discharge)  1) Decrease pain in R wrist to no more than 1/10 worst in ADL/IADL'sprogressing, continue  2) Increase AROM of R wrist flex/ext to WFL for increased functioning in ADL/IADL'sprogressing, continue  3) Return to  cooking without pain.progressing, continue    Plan     Updates/Grading for next session: Continue OT POC    vEelin Manrique OT   4/15/2024

## 2024-04-24 ENCOUNTER — CLINICAL SUPPORT (OUTPATIENT)
Dept: REHABILITATION | Facility: HOSPITAL | Age: 84
End: 2024-04-24
Payer: MEDICARE

## 2024-04-24 DIAGNOSIS — M25.531 RIGHT WRIST PAIN: Primary | ICD-10-CM

## 2024-04-24 PROCEDURE — 97110 THERAPEUTIC EXERCISES: CPT | Mod: PO

## 2024-04-24 PROCEDURE — 97140 MANUAL THERAPY 1/> REGIONS: CPT | Mod: PO

## 2024-04-24 NOTE — PROGRESS NOTES
[facility]  Occupational Therapy Treatment Note     Date: 4/24/2024  Name: Jessenia Da Silva  [facility] Number: [id number]    Therapy Diagnosis:   Encounter Diagnosis   Name Primary?    Right wrist pain Yes     Physician: Marcia Ojeda NP    Physician Orders: Eval and Treat  Medical Diagnosis: S62.024A (ICD-10-CM) - Closed nondisplaced fracture of middle third of scaphoid bone of right wrist, initial encounter M79.641 (ICD-10-CM) - Right hand pain  Surgical Procedure and Date: DOI: 2/4/24  Evaluation Date: 3/28/2024  Insurance Authorization Period Expiration: (3/19/2024-12/31/2024)   Plan of Care Certification Period: 12 weeks; 6/20/24  Date of Return to MD:   Visit # / Visits authorized: 1 / 1        Precautions:  Standard     Time In: 12:20  Time Out: 12:45  Total Billable Time: 25 minutes      Subjective     Patient reports: minimal pain reported; orthosis reported to irritate thumb  She was compliant with home exercise program given last session.   Response to previous treatment: improving ROM      Pain: 2/10  Location: right hands      Objective     Objective Measures updated at progress report unless specified.  Observation/Appearance:  Skin intact; minimal swelling        Elbow and Wrist ROM. Measured in degrees.    3/28/2024 3/28/2024     Left Right   Supination/Pronation WNL WNL   Wrist Ext/Flex 75/73 55/50!   Wrist RD/UD WNL WNL          Strength (Dyanmometer) and Pinch Strength (Pinch Gauge)  Measured in pounds and psi. Average of three trials.    3/28/2024 3/28/2024     Right  Left    Rung II NT NT   Key Pinch NT NT   3pt Pinch NT NT   2pt Pinch NT NT       Treatment     Jessenia received the treatments listed below:      therapeutic exercises to develop endurance and ROM for 10 minutes including:  - Wrist ROM  - gentle isometrics   - flex bar isometrics (yellow)  - isospheres      manual therapy techniques: Soft tissue Mobilization were applied to the: R wrist for 10  minutes, including:  Gentle carpal mobs  IASTM    supervised modalities after being cleared for contradictions: Fluido      Patient Education and Home Exercises     Education provided:   - HEP  - Progress towards goals     Written Home Exercises Provided: Patient instructed to cont prior HEP.  Exercises were reviewed and Jessenia was able to demonstrate them prior to the end of the session.  Jessenia demonstrated good  understanding of the home exercise program provided. See electronic medical record under Patient Instructions for exercises provided during therapy sessions.       Assessment     Pt tolerates treatment well with min complaints of increased pain or fatigue. Plan to progress with gentle strengthening as tolerated.     Jessenia is progressing well towards her goals and there are no updates to goals at this time. Pt prognosis is Good.     Patient will continue to benefit from skilled outpatient occupational therapy to address the deficits listed in the problem list on initial evaluation provide patient/family education and to maximize patient's level of independence in the home and community environment.     Patient's spiritual, cultural and educational needs considered and patient agreeable to plan of care and goals.    Anticipated barriers to occupational therapy: none      Goals:   Short Term Goals: (in 4 weeks)  1) Patient will be independent in HEP progressing, continue  2) Decrease pain in R wrsit to no more than 3/10 worst in ADL/IADL's progressing, continue  3) Increase AROM in wrist flex/ext for improved functioning in ADL/IADL'sprogressing, continue  4) Assess  strength as appropriateprogressing, continue        Long Term Goals: (by discharge)  1) Decrease pain in R wrist to no more than 1/10 worst in ADL/IADL'sprogressing, continue  2) Increase AROM of R wrist flex/ext to WFL for increased functioning in ADL/IADL'sprogressing, continue  3) Return to cooking without pain.progressing,  continue    Plan     Updates/Grading for next session: Continue OT POC    Evelin Manrique OT   4/24/2024

## 2024-04-26 ENCOUNTER — TELEPHONE (OUTPATIENT)
Dept: ORTHOPEDICS | Facility: CLINIC | Age: 84
End: 2024-04-26
Payer: MEDICARE

## 2024-04-30 ENCOUNTER — OFFICE VISIT (OUTPATIENT)
Dept: ORTHOPEDICS | Facility: CLINIC | Age: 84
End: 2024-04-30
Payer: MEDICARE

## 2024-04-30 ENCOUNTER — HOSPITAL ENCOUNTER (OUTPATIENT)
Dept: RADIOLOGY | Facility: OTHER | Age: 84
Discharge: HOME | End: 2024-04-30
Attending: SPECIALIST/TECHNOLOGIST
Payer: MEDICARE

## 2024-04-30 VITALS
SYSTOLIC BLOOD PRESSURE: 118 MMHG | BODY MASS INDEX: 19.37 KG/M2 | DIASTOLIC BLOOD PRESSURE: 68 MMHG | HEART RATE: 79 BPM | WEIGHT: 123.44 LBS | HEIGHT: 67 IN

## 2024-04-30 DIAGNOSIS — R52 PAIN: Primary | ICD-10-CM

## 2024-04-30 DIAGNOSIS — S62.024D CLOSED NONDISPLACED FRACTURE OF MIDDLE THIRD OF SCAPHOID OF RIGHT WRIST WITH ROUTINE HEALING, SUBSEQUENT ENCOUNTER: Primary | ICD-10-CM

## 2024-04-30 DIAGNOSIS — R52 PAIN: ICD-10-CM

## 2024-04-30 PROCEDURE — 99999 PR PBB SHADOW E&M-EST. PATIENT-LVL III: CPT | Mod: PBBFAC,,, | Performed by: SPECIALIST/TECHNOLOGIST

## 2024-04-30 PROCEDURE — 1100F PTFALLS ASSESS-DOCD GE2>/YR: CPT | Mod: CPTII,S$GLB,, | Performed by: SPECIALIST/TECHNOLOGIST

## 2024-04-30 PROCEDURE — 1159F MED LIST DOCD IN RCRD: CPT | Mod: CPTII,S$GLB,, | Performed by: SPECIALIST/TECHNOLOGIST

## 2024-04-30 PROCEDURE — 99214 OFFICE O/P EST MOD 30 MIN: CPT | Mod: S$GLB,,, | Performed by: SPECIALIST/TECHNOLOGIST

## 2024-04-30 PROCEDURE — 3078F DIAST BP <80 MM HG: CPT | Mod: CPTII,S$GLB,, | Performed by: SPECIALIST/TECHNOLOGIST

## 2024-04-30 PROCEDURE — 1126F AMNT PAIN NOTED NONE PRSNT: CPT | Mod: CPTII,S$GLB,, | Performed by: SPECIALIST/TECHNOLOGIST

## 2024-04-30 PROCEDURE — 73130 X-RAY EXAM OF HAND: CPT | Mod: 26,RT,, | Performed by: RADIOLOGY

## 2024-04-30 PROCEDURE — 3288F FALL RISK ASSESSMENT DOCD: CPT | Mod: CPTII,S$GLB,, | Performed by: SPECIALIST/TECHNOLOGIST

## 2024-04-30 PROCEDURE — 3074F SYST BP LT 130 MM HG: CPT | Mod: CPTII,S$GLB,, | Performed by: SPECIALIST/TECHNOLOGIST

## 2024-04-30 PROCEDURE — 73130 X-RAY EXAM OF HAND: CPT | Mod: TC,FY,RT

## 2024-04-30 NOTE — PROGRESS NOTES
Subjective:       Patient ID: Jessenia Da Silva is a 84 y.o. female.    Chief Complaint: Follow-up of the Right Hand    2/20/24   Patient presents to the clinic for a follow up of her right closed nondisplaced fracture of the middle third of her her scaphoid. DOI was 2/4/24 when she fell on an out stretched arm.   She presents  in a thumb spica from her last visit. She is here to review her CT scan. She states she is feeling much better.     3/5/24   Patient presents today in a thumb spica cast for a 2 week f/u of her nondisplaced scaphoid fracture of her right wrist. Repeat xrays were taken today.    Interval HPI   03/19/2024  Patient is here for a follow-up for right right closed nondisplaced fracture of middle 3rd of her scaphoid.  Reports mild discomfort to her right snuffbox.  She denies any pain.  Reports she has stiffness in her right hand and wrist.  Overall reports she is doing well.  She denies any numbness or tingling.  She has been in a thumb spica cast since 2/20/2024.      Patient reports today status post fall involving a outstretched hand.  She states the right wrist took the blunt of her fall when she was on uneven concrete getting the mail.  The fall sustained on 02/04/2024.  She states she went to the emergency department and showed an x-ray of her scaphoid.  She is placed in a thumb spica brace and referred to our clinic for further evaluation.  She notes her pain has improved today.  But does note discomfort when she moves her thumb.  She states she lives alone and is independent.  She denies any numbness or tingling.    Interval HPI  4/30/24  Patient reports 12 weeks status post right closed nondisplaced fracture of the middle 3rd of her scaphoid.  She reports attending therapy regularly and continue use of her thumb spica brace at nighttime and when she is active.  She states she still has some minimal pain over the anatomical snuffbox, but that is mainly after she does a lot of activity.   "She states she never received a bone stimulator. She denies any numbness or tingling.    Past Medical History:   Diagnosis Date    Bilateral carotid artery disease     Cervical spondylosis     Hypertension     Osteopenia     TIA (transient ischemic attack)      Past Surgical History:   Procedure Laterality Date    CATARACT EXTRACTION W/ INTRAOCULAR LENS  IMPLANT, BILATERAL      HYSTERECTOMY      TONSILLECTOMY       Family History   Problem Relation Name Age of Onset    Dementia Mother      Hypertension Father      Pancreatic cancer Sister      Breast cancer Sister       Social History     Socioeconomic History    Marital status:     Number of children: 1   Tobacco Use    Smoking status: Former     Current packs/day: 0.00     Types: Cigarettes     Quit date: 2023     Years since quittin.1    Smokeless tobacco: Never   Substance and Sexual Activity    Alcohol use: Yes     Comment: 2 "ponies" (sm beer) nightly.    Drug use: No    Sexual activity: Not Currently   Social History Narrative    , lives alone, drives. 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        Current Outpatient Medications   Medication Sig Dispense Refill    albuterol (VENTOLIN HFA) 90 mcg/actuation inhaler Inhale 2 puffs into the lungs every 6 (six) hours as needed for Wheezing. Rescue 18 g 0    amLODIPine (NORVASC) 5 MG tablet Take 1 tablet (5 mg total) by mouth once daily. 90 tablet 1    clopidogreL (PLAVIX) 75 mg tablet Take 1 tablet (75 mg total) by mouth once daily. 90 tablet 1    ergocalciferol (ERGOCALCIFEROL) 50,000 unit Cap Take 1 capsule (50,000 Units total) by mouth every 7 days. 12 capsule 1    fluticasone propionate (FLONASE) 50 mcg/actuation nasal spray 2 sprays (100 mcg total) by Each Nostril route once daily. 16 g 5    levocetirizine (XYZAL) 5 MG tablet Take 1 tablet (5 mg total) by mouth daily as needed for Allergies. 90 tablet 0    pravastatin " "(PRAVACHOL) 10 MG tablet Take 1 tablet (10 mg total) by mouth once daily. 90 tablet 1     No current facility-administered medications for this visit.     Review of patient's allergies indicates:  No Known Allergies    Review of Systems        Objective:      Vitals:    04/30/24 0943   BP: 118/68   BP Location: Right arm   Pulse: 79   Weight: 56 kg (123 lb 7.3 oz)   Height: 5' 7" (1.702 m)       Physical Exam  Eyes:      General: No scleral icterus.  Cardiovascular:      Pulses:           Radial pulses are Normal on the right side and Normal on the left side.   Skin:     General: Skin is intact.   Neurological:      Mental Status: She is alert.   Psychiatric:         Mood and Affect: Mood and affect normal.       Hand/Wrist Musculoskeletal Exam    Inspection    Right      Erythema: none      Ecchymosis: none      Edema: none      Deformity: none    Left      Erythema: none      Ecchymosis: none      Edema: none      Deformity: none    Palpation    Palpation additional comments: Patient able to make a full composite fist.  Minimal tenderness present at the anatomical snuffbox.    Range of Motion    Right Hand      Right hand range of motion is normal.      Left Hand      Left hand range of motion is normal.         Neurovascular    Right       Radial pulse: normal      Capillary refill: brisk and <3 sec      Ulnar nerve sensory distribution: normal      Median nerve sensory distribution: normal      Superficial radial nerve sensory distribution: normal    Left       Radial pulse: normal      Capillary refill: brisk and <3 sec      Ulnar nerve sensory distribution: normal      Median nerve sensory distribution: normal      Superficial radial nerve sensory distribution: normal    General    Scleral icterus: no    Labored breathing: no    Psychiatric: normal mood and affect    Neurological: alert    Skin: intact    Diagnostics Review: X-Ray: Reviewed     XR R Hand  4/30/24  Personal interpretation of the x-ray reveals " no signs of fracture dislocations.    XRAY right hand 03/19/2024  FINDINGS:  Similar DJD.  Bony alignment unchanged.  No acute fracture, dislocation or osseous destruction.     Impression:     As above.     CT scan 2/9/24   No fracture. If there is still clinical concern, consider MRI or follow-up radiographs   Assessment:       1. Closed nondisplaced fracture of middle third of scaphoid of right wrist with routine healing, subsequent encounter      84 yr old with a nondisplaced fracture of the middle third of the scaphoid bone    Plan:   Jessenia was seen today for follow-up.    Diagnoses and all orders for this visit:    Closed nondisplaced fracture of middle third of scaphoid of right wrist with routine healing, subsequent encounter         We will transition patient out of the thumb spica brace.  She needs to begin progressing or strengthening within therapy.  Patient is cleared to return back to doing household chores such as sweeping and mopping.  Patient we will follow up in 6 weeks with right wrist x-rays.

## 2024-05-01 ENCOUNTER — CLINICAL SUPPORT (OUTPATIENT)
Dept: REHABILITATION | Facility: HOSPITAL | Age: 84
End: 2024-05-01
Payer: MEDICARE

## 2024-05-01 DIAGNOSIS — M25.531 RIGHT WRIST PAIN: Primary | ICD-10-CM

## 2024-05-01 PROCEDURE — 97110 THERAPEUTIC EXERCISES: CPT | Mod: PO

## 2024-05-01 PROCEDURE — 97022 WHIRLPOOL THERAPY: CPT | Mod: PO

## 2024-05-06 ENCOUNTER — CLINICAL SUPPORT (OUTPATIENT)
Dept: REHABILITATION | Facility: HOSPITAL | Age: 84
End: 2024-05-06
Payer: MEDICARE

## 2024-05-06 DIAGNOSIS — M25.531 RIGHT WRIST PAIN: Primary | ICD-10-CM

## 2024-05-06 PROCEDURE — 97140 MANUAL THERAPY 1/> REGIONS: CPT | Mod: PO

## 2024-05-06 PROCEDURE — 97110 THERAPEUTIC EXERCISES: CPT | Mod: PO

## 2024-05-06 PROCEDURE — 97010 HOT OR COLD PACKS THERAPY: CPT | Mod: PO

## 2024-05-06 NOTE — PROGRESS NOTES
[facility]  Occupational Therapy Treatment Note     Date: 5/6/2024  Name: Jessenia Da Silva  [facility] Number: [id number]    Therapy Diagnosis:   Encounter Diagnosis   Name Primary?    Right wrist pain Yes     Physician: Marcia Ojeda NP    Physician Orders: Eval and Treat  Medical Diagnosis: S62.024A (ICD-10-CM) - Closed nondisplaced fracture of middle third of scaphoid bone of right wrist, initial encounter M79.641 (ICD-10-CM) - Right hand pain  Surgical Procedure and Date: DOI: 2/4/24  Evaluation Date: 3/28/2024  Insurance Authorization Period Expiration: (3/19/2024-12/31/2024)   Plan of Care Certification Period: 12 weeks; 6/20/24  Date of Return to MD:   Visit # / Visits authorized: 1 / 1        Precautions:  Standard     Time In: 12:00  Time Out: 12:45  Total Billable Time: 45 minutes      Subjective     Patient reports: mild discomfort noted at base of thumb.   She was compliant with home exercise program given last session.   Response to previous treatment: improving ROM      Pain: 2/10  Location: right hands      Objective     Objective Measures updated at progress report unless specified.  Observation/Appearance:  Skin intact; minimal swelling        Elbow and Wrist ROM. Measured in degrees.    3/28/2024 3/28/2024     Left Right   Supination/Pronation WNL WNL   Wrist Ext/Flex 75/73 55/50!   Wrist RD/UD WNL WNL          Strength (Dyanmometer) and Pinch Strength (Pinch Gauge)  Measured in pounds and psi. Average of three trials.    3/28/2024 3/28/2024     Right  Left    Rung II NT NT   Key Pinch NT NT   3pt Pinch NT NT   2pt Pinch NT NT       Treatment     Jessenia received the treatments listed below:      therapeutic exercises to develop endurance and ROM for 10 minutes including:  - Wrist ROM  - in hand manipulation w/ pom poms   - flex bar smile/frown/ isometrics (yellow)  - isospheres 3'  - octy 3'  - thumb add pom pom       manual therapy techniques: Soft  tissue Mobilization were applied to the: R wrist for 10 minutes, including:  IASTM    supervised modalities after being cleared for contradictions: Fluido      Patient Education and Home Exercises     Education provided:   - HEP  - Progress towards goals     Written Home Exercises Provided: Patient instructed to cont prior HEP.  Exercises were reviewed and Jessenia was able to demonstrate them prior to the end of the session.  Jessenia demonstrated good  understanding of the home exercise program provided. See electronic medical record under Patient Instructions for exercises provided during therapy sessions.       Assessment     Pt with minimal complaints with exercise today. Improving response and strength noted with light resistance. Plan to progress as tolerated.     Jessenia is progressing well towards her goals and there are no updates to goals at this time. Pt prognosis is Good.     Patient will continue to benefit from skilled outpatient occupational therapy to address the deficits listed in the problem list on initial evaluation provide patient/family education and to maximize patient's level of independence in the home and community environment.     Patient's spiritual, cultural and educational needs considered and patient agreeable to plan of care and goals.    Anticipated barriers to occupational therapy: none      Goals:   Short Term Goals: (in 4 weeks)  1) Patient will be independent in HEP Met  2) Decrease pain in R wrsit to no more than 3/10 worst in ADL/IADL's progressing, continue  3) Increase AROM in wrist flex/ext for improved functioning in ADL/IADL'sMet  4) Assess  strength as appropriateprogressing, continue        Long Term Goals: (by discharge)  1) Decrease pain in R wrist to no more than 1/10 worst in ADL/IADL'sprogressing, continue  2) Increase AROM of R wrist flex/ext to WFL for increased functioning in ADL/IADL'sMet  3) Return to cooking without pain.progressing, continue    Plan      Updates/Grading for next session: Continue OT POC    Evelin Manrique OT   5/6/2024

## 2024-05-13 ENCOUNTER — CLINICAL SUPPORT (OUTPATIENT)
Dept: REHABILITATION | Facility: HOSPITAL | Age: 84
End: 2024-05-13
Payer: MEDICARE

## 2024-05-13 DIAGNOSIS — M25.531 RIGHT WRIST PAIN: Primary | ICD-10-CM

## 2024-05-13 PROCEDURE — 97140 MANUAL THERAPY 1/> REGIONS: CPT | Mod: PO

## 2024-05-13 PROCEDURE — 97010 HOT OR COLD PACKS THERAPY: CPT | Mod: PO

## 2024-05-13 PROCEDURE — 97110 THERAPEUTIC EXERCISES: CPT | Mod: PO

## 2024-05-13 NOTE — PROGRESS NOTES
[facility]  Occupational Therapy Treatment Note     Date: 5/13/2024  Name: Jessenia Da Silva  [facility] Number: [id number]    Therapy Diagnosis:   Encounter Diagnosis   Name Primary?    Right wrist pain Yes     Physician: Marcia Ojeda NP    Physician Orders: Eval and Treat  Medical Diagnosis: S62.024A (ICD-10-CM) - Closed nondisplaced fracture of middle third of scaphoid bone of right wrist, initial encounter M79.641 (ICD-10-CM) - Right hand pain  Surgical Procedure and Date: DOI: 2/4/24  Evaluation Date: 3/28/2024  Insurance Authorization Period Expiration: (3/19/2024-12/31/2024)   Plan of Care Certification Period: 12 weeks; 6/20/24  Date of Return to MD:   Visit # / Visits authorized: 1 / 1        Precautions:  Standard     Time In: 12:00  Time Out: 12:45  Total Billable Time: 45 minutes      Subjective     Patient reports: discomfort reported with weightbearing.   She was compliant with home exercise program given last session.   Response to previous treatment: improving ROM      Pain: 2/10  Location: right hands      Objective     Objective Measures updated at progress report unless specified.  Observation/Appearance:  Skin intact; minimal swelling        Elbow and Wrist ROM. Measured in degrees.    3/28/2024 3/28/2024     Left Right   Supination/Pronation WNL WNL   Wrist Ext/Flex 75/73 55/50!   Wrist RD/UD WNL WNL          Strength (Dyanmometer) and Pinch Strength (Pinch Gauge)  Measured in pounds and psi. Average of three trials.    3/28/2024 3/28/2024     Right  Left    Rung II NT NT   Key Pinch NT NT   3pt Pinch NT NT   2pt Pinch NT NT       Treatment     Jessenia received the treatments listed below:      therapeutic exercises to develop endurance and ROM for 10 minutes including:  - Wrist ROM  - in hand manipulation w/ pom poms   - flex bar smile/frown/ isometrics (yellow)  - isospheres 3'  - octy 3'  - thumb add pom pom   - Powerweb weightbearing x 2 min   -  red clothespin pom pom pickup  - yellow theraputty tooling x 2 min ea      manual therapy techniques: Soft tissue Mobilization were applied to the: R wrist for 10 minutes, including:  IASTM    supervised modalities after being cleared for contradictions: Fluido      Patient Education and Home Exercises     Education provided:   - HEP  - Progress towards goals     Written Home Exercises Provided: Patient instructed to cont prior HEP.  Exercises were reviewed and Jessenia was able to demonstrate them prior to the end of the session.  Jessenia demonstrated good  understanding of the home exercise program provided. See electronic medical record under Patient Instructions for exercises provided during therapy sessions.       Assessment     Continues to c/o  slight pressure and pain with weightbearing, however tolerates progressive resistance well with minimal complains. Plan to progress with strengthening. Plan to progress as tolerated.     Jessenia is progressing well towards her goals and there are no updates to goals at this time. Pt prognosis is Good.     Patient will continue to benefit from skilled outpatient occupational therapy to address the deficits listed in the problem list on initial evaluation provide patient/family education and to maximize patient's level of independence in the home and community environment.     Patient's spiritual, cultural and educational needs considered and patient agreeable to plan of care and goals.    Anticipated barriers to occupational therapy: none      Goals:   Short Term Goals: (in 4 weeks)  1) Patient will be independent in HEP Met  2) Decrease pain in R wrsit to no more than 3/10 worst in ADL/IADL's progressing, continue  3) Increase AROM in wrist flex/ext for improved functioning in ADL/IADL'sMet  4) Assess  strength as appropriateprogressing, continue        Long Term Goals: (by discharge)  1) Decrease pain in R wrist to no more than 1/10 worst in  ADL/IADL'sprogressing, continue  2) Increase AROM of R wrist flex/ext to WFL for increased functioning in ADL/IADL'sMet  3) Return to cooking without pain.progressing, continue    Plan     Updates/Grading for next session: Continue OT TONY Manrique OT   5/13/2024

## 2024-05-15 ENCOUNTER — CLINICAL SUPPORT (OUTPATIENT)
Dept: REHABILITATION | Facility: HOSPITAL | Age: 84
End: 2024-05-15
Payer: MEDICARE

## 2024-05-15 DIAGNOSIS — M25.531 RIGHT WRIST PAIN: Primary | ICD-10-CM

## 2024-05-15 PROCEDURE — 97110 THERAPEUTIC EXERCISES: CPT | Mod: PO

## 2024-05-15 PROCEDURE — 97530 THERAPEUTIC ACTIVITIES: CPT | Mod: PO

## 2024-05-15 PROCEDURE — 97010 HOT OR COLD PACKS THERAPY: CPT | Mod: PO

## 2024-05-15 NOTE — PROGRESS NOTES
[facility]  Occupational Therapy Treatment Note     Date: 5/15/2024  Name: Jessenia Da Silva  [facility] Number: [id number]    Therapy Diagnosis:   Encounter Diagnosis   Name Primary?    Right wrist pain Yes     Physician: Marcia Ojeda NP    Physician Orders: Eval and Treat  Medical Diagnosis: S62.024A (ICD-10-CM) - Closed nondisplaced fracture of middle third of scaphoid bone of right wrist, initial encounter M79.641 (ICD-10-CM) - Right hand pain  Surgical Procedure and Date: DOI: 2/4/24  Evaluation Date: 3/28/2024  Insurance Authorization Period Expiration: (3/19/2024-12/31/2024)   Plan of Care Certification Period: 12 weeks; 6/20/24  Date of Return to MD:   Visit # / Visits authorized: 1 / 1        Precautions:  Standard     Time In: 12:00  Time Out: 12:45  Total Billable Time: 45 minutes      Subjective     Patient reports: improvements noted with weightbearing.   She was compliant with home exercise program given last session.   Response to previous treatment: improving ROM      Pain: 2/10  Location: right hands      Objective     Objective Measures updated at progress report unless specified.  Observation/Appearance:  Skin intact; minimal swelling        Elbow and Wrist ROM. Measured in degrees.    3/28/2024 3/28/2024     Left Right   Supination/Pronation WNL WNL   Wrist Ext/Flex 75/73 55/50!   Wrist RD/UD WNL WNL          Strength (Dyanmometer) and Pinch Strength (Pinch Gauge)  Measured in pounds and psi. Average of three trials.    3/28/2024 3/28/2024     Right  Left    Rung II NT NT   Key Pinch NT NT   3pt Pinch NT NT   2pt Pinch NT NT       Treatment     Jessenia received the treatments listed below:      therapeutic exercises to develop endurance and ROM for 10 minutes including:  - Wrist ROM  - in hand manipulation w/ pom poms   - flex bar smile/frown/ isometrics (yellow)  - isospheres 3'  - octy 3'  - thumb add pom pom   - Powerweb weightbearing x 2 min   - red  clothespin pom pom pickup  - yellow theraputty tooling x 2 min ea      manual therapy techniques: Soft tissue Mobilization were applied to the: R wrist for 10 minutes, including:  IASTM    supervised modalities after being cleared for contradictions: Fluido      Patient Education and Home Exercises     Education provided:   - HEP  - Progress towards goals     Written Home Exercises Provided: Patient instructed to cont prior HEP.  Exercises were reviewed and Jessenia was able to demonstrate them prior to the end of the session.  Jessenia demonstrated good  understanding of the home exercise program provided. See electronic medical record under Patient Instructions for exercises provided during therapy sessions.       Assessment     Decreased complaints of discomfort with weightbearing and all planes of ROM. Plan to reassess next visit.     Jessenia is progressing well towards her goals and there are no updates to goals at this time. Pt prognosis is Good.     Patient will continue to benefit from skilled outpatient occupational therapy to address the deficits listed in the problem list on initial evaluation provide patient/family education and to maximize patient's level of independence in the home and community environment.     Patient's spiritual, cultural and educational needs considered and patient agreeable to plan of care and goals.    Anticipated barriers to occupational therapy: none      Goals:   Short Term Goals: (in 4 weeks)  1) Patient will be independent in HEP Met  2) Decrease pain in R wrsit to no more than 3/10 worst in ADL/IADL's progressing, continue  3) Increase AROM in wrist flex/ext for improved functioning in ADL/IADL'sMet  4) Assess  strength as appropriateprogressing, continue        Long Term Goals: (by discharge)  1) Decrease pain in R wrist to no more than 1/10 worst in ADL/IADL'sprogressing, continue  2) Increase AROM of R wrist flex/ext to WFL for increased functioning in  ADL/IADL'sMet  3) Return to cooking without pain.progressing, continue    Plan     Updates/Grading for next session: Continue OT POC    Evelin Manrique OT   5/15/2024

## 2024-05-20 ENCOUNTER — CLINICAL SUPPORT (OUTPATIENT)
Dept: REHABILITATION | Facility: HOSPITAL | Age: 84
End: 2024-05-20
Payer: MEDICARE

## 2024-05-20 DIAGNOSIS — M25.531 RIGHT WRIST PAIN: Primary | ICD-10-CM

## 2024-05-20 PROCEDURE — 97140 MANUAL THERAPY 1/> REGIONS: CPT | Mod: PO

## 2024-05-20 PROCEDURE — 97110 THERAPEUTIC EXERCISES: CPT | Mod: PO

## 2024-05-20 NOTE — PROGRESS NOTES
[facility]  Occupational Therapy Treatment Note     Date: 5/20/2024  Name: Jessenia Da Silva  [facility] Number: [id number]    Therapy Diagnosis:   Encounter Diagnosis   Name Primary?    Right wrist pain Yes     Physician: Marcia Ojeda NP    Physician Orders: Eval and Treat  Medical Diagnosis: S62.024A (ICD-10-CM) - Closed nondisplaced fracture of middle third of scaphoid bone of right wrist, initial encounter M79.641 (ICD-10-CM) - Right hand pain  Surgical Procedure and Date: DOI: 2/4/24  Evaluation Date: 3/28/2024  Insurance Authorization Period Expiration: (3/19/2024-12/31/2024)   Plan of Care Certification Period: 12 weeks; 6/20/24  Date of Return to MD:   Visit # / Visits authorized: 1 / 1        Precautions:  Standard     Time In: 12:00  Time Out: 12:45  Total Billable Time: 45 minutes      Subjective     Patient reports: reports continued discomfort with resistance or weightbearing; has not fully returned to household work.   She was compliant with home exercise program given last session.   Response to previous treatment: improving ROM      Pain: 2/10  Location: right hands      Objective     Objective Measures updated at progress report unless specified.  Observation/Appearance:  Skin intact; minimal swelling        Elbow and Wrist ROM. Measured in degrees.    3/28/2024 3/28/2024 5/20/2024     Left Right Right    Supination/Pronation WNL WNL WNL   Wrist Ext/Flex 75/73 55/50! 70/68   Wrist RD/UD WNL WNL WNL          Strength (Dyanmometer) and Pinch Strength (Pinch Gauge)  Measured in pounds and psi. Average of three trials.    3/28/2024 3/28/2024 5/20/2024     Right  Left  Right   Rung II NT 40 35   Key Pinch NT 10 10   3pt Pinch NT 8 10             Treatment     Jessenia received the treatments listed below:      therapeutic exercises to develop endurance and ROM for 10 minutes including:  - Wrist ROM  - in hand manipulation w/ pom poms   - flex bar smile/frown/  isometrics (yellow)  - isospheres 3'  - octy 3'  - thumb add pom pom   - Powerweb weightbearing x 2 min   - red clothespin pom pom pickup  - yellow theraputty tooling x 2 min ea  - Red theraband with pvc for dynamic stability and strength flex/ext x 10 ae      manual therapy techniques: Soft tissue Mobilization were applied to the: R wrist for 10 minutes, including:  IASTM    supervised modalities after being cleared for contradictions: Fluido      Patient Education and Home Exercises     Education provided:   - HEP  - Progress towards goals     Written Home Exercises Provided: Patient instructed to cont prior HEP.  Exercises were reviewed and Jessenia was able to demonstrate them prior to the end of the session.  Jessenia demonstrated good  understanding of the home exercise program provided. See electronic medical record under Patient Instructions for exercises provided during therapy sessions.       Assessment     Minimal pain reported with activity. States she continues to feel mild pressure and discomfort with weightbearing and resisted activity. Endurance continues to improve.    Jessenia is progressing well towards her goals and there are no updates to goals at this time. Pt prognosis is Good.     Patient will continue to benefit from skilled outpatient occupational therapy to address the deficits listed in the problem list on initial evaluation provide patient/family education and to maximize patient's level of independence in the home and community environment.     Patient's spiritual, cultural and educational needs considered and patient agreeable to plan of care and goals.    Anticipated barriers to occupational therapy: none      Goals:   Short Term Goals: (in 4 weeks)  1) Patient will be independent in HEP Met  2) Decrease pain in R wrsit to no more than 3/10 worst in ADL/IADL's Met  3) Increase AROM in wrist flex/ext for improved functioning in ADL/IADL'sMet  4) Assess  strength as  appropriate Met        Long Term Goals: (by discharge)  1) Decrease pain in R wrist to no more than 1/10 worst in ADL/IADL'sprogressing, continue  2) Increase AROM of R wrist flex/ext to WFL for increased functioning in ADL/IADL'sMet  3) Return to cooking without pain.progressing, continue    Plan     Updates/Grading for next session: Continue OT TONY Manrique OT   5/20/2024

## 2024-05-22 ENCOUNTER — CLINICAL SUPPORT (OUTPATIENT)
Dept: REHABILITATION | Facility: HOSPITAL | Age: 84
End: 2024-05-22
Payer: MEDICARE

## 2024-05-22 DIAGNOSIS — M25.531 RIGHT WRIST PAIN: Primary | ICD-10-CM

## 2024-05-22 PROCEDURE — 97010 HOT OR COLD PACKS THERAPY: CPT | Mod: PO

## 2024-05-22 PROCEDURE — 97140 MANUAL THERAPY 1/> REGIONS: CPT | Mod: PO

## 2024-05-22 PROCEDURE — 97110 THERAPEUTIC EXERCISES: CPT | Mod: PO

## 2024-05-22 NOTE — PROGRESS NOTES
[facility]  Occupational Therapy Treatment Note     Date: 5/22/2024  Name: Jessenia Da Silva  [facility] Number: [id number]    Therapy Diagnosis:   Encounter Diagnosis   Name Primary?    Right wrist pain Yes     Physician: Marcia Ojeda NP    Physician Orders: Eval and Treat  Medical Diagnosis: S62.024A (ICD-10-CM) - Closed nondisplaced fracture of middle third of scaphoid bone of right wrist, initial encounter M79.641 (ICD-10-CM) - Right hand pain  Surgical Procedure and Date: DOI: 2/4/24  Evaluation Date: 3/28/2024  Insurance Authorization Period Expiration: (3/19/2024-12/31/2024)   Plan of Care Certification Period: 12 weeks; 6/20/24  Date of Return to MD:   Visit # / Visits authorized: 1 / 1        Precautions:  Standard     Time In: 12:00  Time Out: 12:45  Total Billable Time: 45 minutes      Subjective     Patient reports:she has returned to normal functional activity without difficulty; continues to wear brace in public d/t fear of safety.  She was compliant with home exercise program given last session.   Response to previous treatment: improving ROM      Pain: 0/10  Location: right hands      Objective     Objective Measures updated at progress report unless specified.  Observation/Appearance:  Skin intact; minimal swelling        Elbow and Wrist ROM. Measured in degrees.    3/28/2024 3/28/2024 5/20/2024     Left Right Right    Supination/Pronation WNL WNL WNL   Wrist Ext/Flex 75/73 55/50! 70/68   Wrist RD/UD WNL WNL WNL          Strength (Dyanmometer) and Pinch Strength (Pinch Gauge)  Measured in pounds and psi. Average of three trials.    3/28/2024 3/28/2024 5/20/2024     Right  Left  Right   Rung II NT 43 40   Key Pinch NT 10 10   3pt Pinch NT 8 10             Treatment     Jessenia received the treatments listed below:      therapeutic exercises to develop endurance and ROM for 10 minutes including:  - Wrist ROM  - in hand manipulation w/ pom poms   - flex bar  smile/frown/ isometrics (yellow)  - isospheres 3'  - octy 3'  - thumb add pom pom   - Powerweb weightbearing x 2 min   - red clothespin pom pom pickup  - yellow theraputty tooling x 2 min ea  - Red theraband with pvc for dynamic stability and strength flex/ext x 10 ae      manual therapy techniques: Soft tissue Mobilization were applied to the: R wrist for 10 minutes, including:  IASTM    supervised modalities after being cleared for contradictions: Fluido      Patient Education and Home Exercises     Education provided:   - HEP  - Progress towards goals     Written Home Exercises Provided: Patient instructed to cont prior HEP.  Exercises were reviewed and Jessenia was able to demonstrate them prior to the end of the session.  Jessenia demonstrated good  understanding of the home exercise program provided. See electronic medical record under Patient Instructions for exercises provided during therapy sessions.       Assessment     Pt performs all activity with no complaints of pain. Minimal fatigue with wrist and digit ROM as well as with strengthening. At this time as pt has met all goals; I am recommending discharge from OT services but will hold care at this time until follow up imaging is complete.     Anticipated barriers to occupational therapy: none      Goals:   Short Term Goals: (in 4 weeks)  1) Patient will be independent in HEP Met  2) Decrease pain in R wrsit to no more than 3/10 worst in ADL/IADL's Met  3) Increase AROM in wrist flex/ext for improved functioning in ADL/IADL'sMet  4) Assess  strength as appropriate Met        Long Term Goals: (by discharge)  1) Decrease pain in R wrist to no more than 1/10 worst in ADL/IADL's Met  2) Increase AROM of R wrist flex/ext to WFL for increased functioning in ADL/IADL'sMet  3) Return to cooking without pain.  Met    Plan     Updates/Grading for next session: discharge from therapy.    Evelin Manrique, OT   5/22/2024

## 2024-05-31 DIAGNOSIS — Z86.73 OLD LACUNAR STROKE WITHOUT LATE EFFECT: ICD-10-CM

## 2024-05-31 RX ORDER — CLOPIDOGREL BISULFATE 75 MG/1
75 TABLET ORAL
Qty: 90 TABLET | Refills: 1 | Status: SHIPPED | OUTPATIENT
Start: 2024-05-31

## 2024-05-31 NOTE — TELEPHONE ENCOUNTER
Care Due:                  Date            Visit Type   Department     Provider  --------------------------------------------------------------------------------                                SAME DAY -                              ESTABLISHED   [facility]   Indigo Paul  Last Visit: 02-      PATIENT      CARE           [place]                              EP -                              PRIMARY      [facility]   Indigo Miller  Next Visit: 06-      CARE ([facility])   CARE           [place]                                                            Last  Test          Frequency    Reason                     Performed    Due Date  --------------------------------------------------------------------------------    Vitamin D...  12 months..  ergocalciferol...........  05- 05-    [employer] Embedded Care Due Messages. Reference number: [id number].   5/31/2024 12:13:31 AM CDT

## 2024-05-31 NOTE — TELEPHONE ENCOUNTER
Refill Decision Note   Jessenia Avinash  is requesting a refill authorization.  Brief Assessment and Rationale for Refill:  Approve     Medication Therapy Plan:         Comments:     Note composed:1:32 AM 05/31/2024

## 2024-06-06 ENCOUNTER — TELEPHONE (OUTPATIENT)
Dept: ORTHOPEDICS | Facility: CLINIC | Age: 84
End: 2024-06-06
Payer: MEDICARE

## 2024-06-11 ENCOUNTER — OFFICE VISIT (OUTPATIENT)
Dept: ORTHOPEDICS | Facility: CLINIC | Age: 84
End: 2024-06-11
Payer: MEDICARE

## 2024-06-11 ENCOUNTER — HOSPITAL ENCOUNTER (OUTPATIENT)
Dept: RADIOLOGY | Facility: OTHER | Age: 84
Discharge: HOME | End: 2024-06-11
Attending: SPECIALIST/TECHNOLOGIST
Payer: MEDICARE

## 2024-06-11 VITALS — WEIGHT: 123.44 LBS | HEIGHT: 67 IN | BODY MASS INDEX: 19.37 KG/M2

## 2024-06-11 DIAGNOSIS — S62.024D CLOSED NONDISPLACED FRACTURE OF MIDDLE THIRD OF SCAPHOID OF RIGHT WRIST WITH ROUTINE HEALING, SUBSEQUENT ENCOUNTER: Primary | ICD-10-CM

## 2024-06-11 DIAGNOSIS — R52 PAIN: ICD-10-CM

## 2024-06-11 PROCEDURE — 73110 X-RAY EXAM OF WRIST: CPT | Mod: 26,RT,, | Performed by: RADIOLOGY

## 2024-06-11 PROCEDURE — 99999 PR PBB SHADOW E&M-EST. PATIENT-LVL III: CPT | Mod: PBBFAC,,, | Performed by: SPECIALIST/TECHNOLOGIST

## 2024-06-11 PROCEDURE — 99213 OFFICE O/P EST LOW 20 MIN: CPT | Mod: S$GLB,,, | Performed by: SPECIALIST/TECHNOLOGIST

## 2024-06-11 PROCEDURE — 3288F FALL RISK ASSESSMENT DOCD: CPT | Mod: CPTII,S$GLB,, | Performed by: SPECIALIST/TECHNOLOGIST

## 2024-06-11 PROCEDURE — 1126F AMNT PAIN NOTED NONE PRSNT: CPT | Mod: CPTII,S$GLB,, | Performed by: SPECIALIST/TECHNOLOGIST

## 2024-06-11 PROCEDURE — 1159F MED LIST DOCD IN RCRD: CPT | Mod: CPTII,S$GLB,, | Performed by: SPECIALIST/TECHNOLOGIST

## 2024-06-11 PROCEDURE — 73110 X-RAY EXAM OF WRIST: CPT | Mod: TC,FY,RT

## 2024-06-11 PROCEDURE — 1101F PT FALLS ASSESS-DOCD LE1/YR: CPT | Mod: CPTII,S$GLB,, | Performed by: SPECIALIST/TECHNOLOGIST

## 2024-06-11 NOTE — PROGRESS NOTES
Subjective:       Patient ID: Jessenia Da Silva is a 84 y.o. female.    Chief Complaint: Follow-up of the Right Hand    2/20/24   Patient presents to the clinic for a follow up of her right closed nondisplaced fracture of the middle third of her her scaphoid. DOI was 2/4/24 when she fell on an out stretched arm.   She presents  in a thumb spica from her last visit. She is here to review her CT scan. She states she is feeling much better.     3/5/24   Patient presents today in a thumb spica cast for a 2 week f/u of her nondisplaced scaphoid fracture of her right wrist. Repeat xrays were taken today.    Interval HPI   03/19/2024  Patient is here for a follow-up for right right closed nondisplaced fracture of middle 3rd of her scaphoid.  Reports mild discomfort to her right snuffbox.  She denies any pain.  Reports she has stiffness in her right hand and wrist.  Overall reports she is doing well.  She denies any numbness or tingling.  She has been in a thumb spica cast since 2/20/2024.      Patient reports today status post fall involving a outstretched hand.  She states the right wrist took the blunt of her fall when she was on uneven concrete getting the mail.  The fall sustained on 02/04/2024.  She states she went to the emergency department and showed an x-ray of her scaphoid.  She is placed in a thumb spica brace and referred to our clinic for further evaluation.  She notes her pain has improved today.  But does note discomfort when she moves her thumb.  She states she lives alone and is independent.  She denies any numbness or tingling.    Interval HPI  4/30/24  Patient reports 12 weeks status post right closed nondisplaced fracture of the middle 3rd of her scaphoid.  She reports attending therapy regularly and continue use of her thumb spica brace at nighttime and when she is active.  She states she still has some minimal pain over the anatomical snuffbox, but that is mainly after she does a lot of activity.   "She states she never received a bone stimulator. She denies any numbness or tingling.    Interval HPI  24  Patient reports 3 months status post right closed nondisplaced fracture of the middle 3rd of her scaphoid.  She reports that she is doing very well.  She has returned to most activities without any issues.  She has been fully discharged from therapy.    Past Medical History:   Diagnosis Date    Bilateral carotid artery disease     Cervical spondylosis     Hypertension     Osteopenia     TIA (transient ischemic attack)      Past Surgical History:   Procedure Laterality Date    CATARACT EXTRACTION W/ INTRAOCULAR LENS  IMPLANT, BILATERAL      HYSTERECTOMY      TONSILLECTOMY       Family History   Problem Relation Name Age of Onset    Dementia Mother      Hypertension Father      Pancreatic cancer Sister      Breast cancer Sister       Social History     Socioeconomic History    Marital status:     Number of children: 1   Tobacco Use    Smoking status: Former     Current packs/day: 0.00     Types: Cigarettes     Quit date: 2023     Years since quittin.3    Smokeless tobacco: Never   Substance and Sexual Activity    Alcohol use: Yes     Comment: 2 "ponies" (sm beer) nightly.    Drug use: No    Sexual activity: Not Currently   Social History Narrative    , lives alone, drives. 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 Son lives locally, he is a heart and kidney transplant recipient.        Current Outpatient Medications   Medication Sig Dispense Refill    albuterol (VENTOLIN HFA) 90 mcg/actuation inhaler Inhale 2 puffs into the lungs every 6 (six) hours as needed for Wheezing. Rescue 18 g 0    amLODIPine (NORVASC) 5 MG tablet Take 1 tablet (5 mg total) by mouth once daily. 90 tablet 1    clopidogreL (PLAVIX) 75 mg tablet TAKE 1 TABLET BY MOUTH EVERY DAY 90 tablet 1    ergocalciferol (ERGOCALCIFEROL) 50,000 unit Cap Take 1 capsule (50,000 Units total) by mouth every 7 days. 12 capsule 1    " "fluticasone propionate (FLONASE) 50 mcg/actuation nasal spray 2 sprays (100 mcg total) by Each Nostril route once daily. 16 g 5    levocetirizine (XYZAL) 5 MG tablet Take 1 tablet (5 mg total) by mouth daily as needed for Allergies. 90 tablet 0    pravastatin (PRAVACHOL) 10 MG tablet Take 1 tablet (10 mg total) by mouth once daily. 90 tablet 1     No current facility-administered medications for this visit.     Review of patient's allergies indicates:  No Known Allergies    Review of Systems        Objective:      Vitals:    06/11/24 1029   Weight: 56 kg (123 lb 7.3 oz)   Height: 5' 7" (1.702 m)       Physical Exam  Eyes:      General: No scleral icterus.  Cardiovascular:      Pulses:           Radial pulses are Normal on the right side and Normal on the left side.   Skin:     General: Skin is intact.   Neurological:      Mental Status: She is alert.   Psychiatric:         Mood and Affect: Mood and affect normal.       Hand/Wrist Musculoskeletal Exam    Inspection    Right      Erythema: none      Ecchymosis: none      Edema: none      Deformity: none    Left      Erythema: none      Ecchymosis: none      Edema: none      Deformity: none    Palpation    Palpation additional comments: Patient able to make a full composite fist.  Minimal tenderness present at the anatomical snuffbox.    Range of Motion    Right Hand      Right hand range of motion is normal.      Left Hand      Left hand range of motion is normal.         Neurovascular    Right       Radial pulse: normal      Capillary refill: brisk and <3 sec      Ulnar nerve sensory distribution: normal      Median nerve sensory distribution: normal      Superficial radial nerve sensory distribution: normal    Left       Radial pulse: normal      Capillary refill: brisk and <3 sec      Ulnar nerve sensory distribution: normal      Median nerve sensory distribution: normal      Superficial radial nerve sensory distribution: normal    General    Scleral icterus: no    " Labored breathing: no    Psychiatric: normal mood and affect    Neurological: alert    Skin: intact    Diagnostics Review: X-Ray: Reviewed     XR R Hand  4/30/24  Personal interpretation of the x-ray reveals no signs of fracture dislocations.    XRAY right hand 03/19/2024  FINDINGS:  Similar DJD.  Bony alignment unchanged.  No acute fracture, dislocation or osseous destruction.     Impression:     As above.     CT scan 2/9/24   No fracture. If there is still clinical concern, consider MRI or follow-up radiographs   Assessment:       1. Closed nondisplaced fracture of middle third of scaphoid of right wrist with routine healing, subsequent encounter        84 yr old with a nondisplaced fracture of the middle third of the scaphoid bone    Plan:   Jessenia was seen today for follow-up.    Diagnoses and all orders for this visit:    Closed nondisplaced fracture of middle third of scaphoid of right wrist with routine healing, subsequent encounter      Patient doing well status post scaphoid injury.  She is returned to activities of daily living with minimal discomfort.  Patient is to continue home therapy exercises as well as discontinue use of her brace.  Patient we will follow up in clinic p.r.n.

## 2024-06-17 DIAGNOSIS — E55.9 VITAMIN D DEFICIENCY: ICD-10-CM

## 2024-06-17 NOTE — TELEPHONE ENCOUNTER
2/11/2020 [address] 
[address] [facility] [address] [address] Dear Ms. Rickie Hodges You were seen in the Emergency Department of [address] [place] [address] on 2/9/20 and had lab tests performed. We would like to discuss these results with you . Please call the Emergency Department at your earliest convenience at [phone], to speak with one of our providers. The Urine culture from your Emergency Department visit on 2/9/20 was positive. If you have not improved or are worsening, please follow up with your primary care doctor or Emergency department as soon as possible. Your antibiotic may need to be changed. If you have any questions please contact the Emergency Department at [phone]. Sincerely, KEMAL [facility] EMERGENCY DEPT 
[address] [facility] [address] 
[phone] No care due was identified.  [facility] Embedded Care Due Messages. Reference number: [id number].   6/17/2024 10:01:14 AM CDT

## 2024-06-19 RX ORDER — ERGOCALCIFEROL 1.25 MG/1
50000 CAPSULE ORAL
Qty: 12 CAPSULE | Refills: 1 | Status: SHIPPED | OUTPATIENT
Start: 2024-06-19

## 2024-06-21 DIAGNOSIS — I70.0 THORACIC AORTA ATHEROSCLEROSIS: ICD-10-CM

## 2024-06-21 DIAGNOSIS — I65.23 BILATERAL CAROTID ARTERY STENOSIS: ICD-10-CM

## 2024-06-21 DIAGNOSIS — I11.9 HYPERTENSIVE HEART DISEASE WITHOUT HEART FAILURE: ICD-10-CM

## 2024-06-21 RX ORDER — AMLODIPINE BESYLATE 5 MG/1
5 TABLET ORAL
Qty: 90 TABLET | Refills: 2 | Status: SHIPPED | OUTPATIENT
Start: 2024-06-21

## 2024-06-21 RX ORDER — PRAVASTATIN SODIUM 10 MG/1
10 TABLET ORAL
Qty: 90 TABLET | Refills: 1 | Status: SHIPPED | OUTPATIENT
Start: 2024-06-21

## 2024-06-21 NOTE — TELEPHONE ENCOUNTER
No care due was identified.  [facility] Embedded Care Due Messages. Reference number: [id number].   6/21/2024 12:02:00 AM CDT

## 2024-06-21 NOTE — TELEPHONE ENCOUNTER
Refill Decision Note   Jessenia Avinash  is requesting a refill authorization.  Brief Assessment and Rationale for Refill:  Approve     Medication Therapy Plan:         Comments:     Note composed:3:39 AM 06/21/2024

## 2024-06-25 ENCOUNTER — TELEPHONE (OUTPATIENT)
Dept: PRIMARY CARE CLINIC | Facility: CLINIC | Age: 84
End: 2024-06-25
Payer: MEDICARE

## 2024-06-25 NOTE — TELEPHONE ENCOUNTER
----- Message from Marizaginette Moore sent at 6/25/2024  3:26 PM CDT -----  Contact: Self [phone]  Would like to receive medical advice.    Would they like a call back or a response via [facility]:  call back    Additional information: Calling to speak with the office regarding the meds clopidogreL (PLAVIX) 75 mg tablet the blood pressure. Pt states she has been weak.

## 2024-06-25 NOTE — TELEPHONE ENCOUNTER
Spoke with patient and reviewed her medications and the reason that she is taking them.  Advised to not take advice from the people who answer the phone as they are not clinical and have misinformed her about her medications.  Advised that she missed her 6 month f/u on 6/4/24 and she states that she did not have that on her calendar.  Scheduled appointment for f/u amd med review on Monday 7/1/24 t 210:00 am.

## 2024-07-01 ENCOUNTER — OFFICE VISIT (OUTPATIENT)
Dept: PRIMARY CARE CLINIC | Facility: CLINIC | Age: 84
End: 2024-07-01
Payer: MEDICARE

## 2024-07-01 ENCOUNTER — LAB VISIT (OUTPATIENT)
Dept: LAB | Facility: HOSPITAL | Age: 84
End: 2024-07-01
Attending: INTERNAL MEDICINE
Payer: MEDICARE

## 2024-07-01 VITALS
WEIGHT: 118.63 LBS | DIASTOLIC BLOOD PRESSURE: 72 MMHG | BODY MASS INDEX: 18.62 KG/M2 | SYSTOLIC BLOOD PRESSURE: 120 MMHG | OXYGEN SATURATION: 97 % | HEART RATE: 78 BPM | TEMPERATURE: 98 F | HEIGHT: 67 IN

## 2024-07-01 DIAGNOSIS — R91.1 SOLITARY PULMONARY NODULE: ICD-10-CM

## 2024-07-01 DIAGNOSIS — I11.9 HYPERTENSIVE HEART DISEASE WITHOUT HEART FAILURE: Primary | ICD-10-CM

## 2024-07-01 DIAGNOSIS — I70.0 THORACIC AORTA ATHEROSCLEROSIS: ICD-10-CM

## 2024-07-01 DIAGNOSIS — J44.9 CHRONIC OBSTRUCTIVE PULMONARY DISEASE, UNSPECIFIED COPD TYPE: ICD-10-CM

## 2024-07-01 DIAGNOSIS — I11.9 HYPERTENSIVE HEART DISEASE WITHOUT HEART FAILURE: ICD-10-CM

## 2024-07-01 DIAGNOSIS — R91.8 MULTIPLE SUBSOLID LUNG NODULES LESS THAN 6 MM IN DIAMETER: ICD-10-CM

## 2024-07-01 DIAGNOSIS — F32.A DEPRESSION, UNSPECIFIED DEPRESSION TYPE: ICD-10-CM

## 2024-07-01 LAB
ANION GAP SERPL CALC-SCNC: 9 MMOL/L (ref 8–16)
BUN SERPL-MCNC: 7 MG/DL (ref 8–23)
CALCIUM SERPL-MCNC: 9.7 MG/DL (ref 8.7–10.5)
CHLORIDE SERPL-SCNC: 96 MMOL/L (ref 95–110)
CO2 SERPL-SCNC: 30 MMOL/L (ref 23–29)
CREAT SERPL-MCNC: 0.7 MG/DL (ref 0.5–1.4)
ERYTHROCYTE [DISTWIDTH] IN BLOOD BY AUTOMATED COUNT: 14.1 % (ref 11.5–14.5)
EST. GFR  (NO RACE VARIABLE): >60 ML/MIN/1.73 M^2
GLUCOSE SERPL-MCNC: 91 MG/DL (ref 70–110)
HCT VFR BLD AUTO: 39.8 % (ref 37–48.5)
HGB BLD-MCNC: 13.7 G/DL (ref 12–16)
MCH RBC QN AUTO: 31.8 PG (ref 27–31)
MCHC RBC AUTO-ENTMCNC: 34.4 G/DL (ref 32–36)
MCV RBC AUTO: 92 FL (ref 82–98)
PLATELET # BLD AUTO: 251 K/UL (ref 150–450)
PMV BLD AUTO: 9.8 FL (ref 9.2–12.9)
POTASSIUM SERPL-SCNC: 4 MMOL/L (ref 3.5–5.1)
RBC # BLD AUTO: 4.31 M/UL (ref 4–5.4)
SODIUM SERPL-SCNC: 135 MMOL/L (ref 136–145)
WBC # BLD AUTO: 6.31 K/UL (ref 3.9–12.7)

## 2024-07-01 PROCEDURE — 99214 OFFICE O/P EST MOD 30 MIN: CPT | Mod: S$GLB,,, | Performed by: INTERNAL MEDICINE

## 2024-07-01 PROCEDURE — 1100F PTFALLS ASSESS-DOCD GE2>/YR: CPT | Mod: CPTII,S$GLB,, | Performed by: INTERNAL MEDICINE

## 2024-07-01 PROCEDURE — 85027 COMPLETE CBC AUTOMATED: CPT | Performed by: INTERNAL MEDICINE

## 2024-07-01 PROCEDURE — 3074F SYST BP LT 130 MM HG: CPT | Mod: CPTII,S$GLB,, | Performed by: INTERNAL MEDICINE

## 2024-07-01 PROCEDURE — 36415 COLL VENOUS BLD VENIPUNCTURE: CPT | Mod: PN | Performed by: INTERNAL MEDICINE

## 2024-07-01 PROCEDURE — 1126F AMNT PAIN NOTED NONE PRSNT: CPT | Mod: CPTII,S$GLB,, | Performed by: INTERNAL MEDICINE

## 2024-07-01 PROCEDURE — 1159F MED LIST DOCD IN RCRD: CPT | Mod: CPTII,S$GLB,, | Performed by: INTERNAL MEDICINE

## 2024-07-01 PROCEDURE — 99999 PR PBB SHADOW E&M-EST. PATIENT-LVL IV: CPT | Mod: PBBFAC,,, | Performed by: INTERNAL MEDICINE

## 2024-07-01 PROCEDURE — 1160F RVW MEDS BY RX/DR IN RCRD: CPT | Mod: CPTII,S$GLB,, | Performed by: INTERNAL MEDICINE

## 2024-07-01 PROCEDURE — 3078F DIAST BP <80 MM HG: CPT | Mod: CPTII,S$GLB,, | Performed by: INTERNAL MEDICINE

## 2024-07-01 PROCEDURE — 80048 BASIC METABOLIC PNL TOTAL CA: CPT | Performed by: INTERNAL MEDICINE

## 2024-07-01 PROCEDURE — 3288F FALL RISK ASSESSMENT DOCD: CPT | Mod: CPTII,S$GLB,, | Performed by: INTERNAL MEDICINE

## 2024-07-01 RX ORDER — PREDNISOLONE ACETATE 10 MG/ML
1 SUSPENSION/ DROPS OPHTHALMIC 2 TIMES DAILY
COMMUNITY
Start: 2024-06-20

## 2024-07-01 RX ORDER — SERTRALINE HYDROCHLORIDE 25 MG/1
25 TABLET, FILM COATED ORAL DAILY
Qty: 30 TABLET | Refills: 5 | Status: SHIPPED | OUTPATIENT
Start: 2024-07-01

## 2024-07-01 NOTE — PROGRESS NOTES
Subjective:      Patient ID: Jessenia Da Silva is a 84 y.o. female.    Chief Complaint: Follow-up    Jessenia was last seen by me on 2/15/2024.    Patient largely doing well. Seen in January for cough, which has resolved with no new complaints. Expressed continued concerns re: weight loss. Previously investigated in Dec 2023, no significant findings on labs or imaging. States loss of appetite and 5 lb weight loss over last 6 months. Attributes this to losing her  and difficulty of cooking for one person and challenges of living alone. Endorses being less active, slight loss of energy and increase in sleep, slowing down the last few months. Per patient, possibly experiencing some depression, which is possibly a contributing factor to her weight loss/loss of appetite. Says she still sees her son and grandchildren and still active socially, but not as much as before. After discussion with patient, she agreed to try a low-dose antidepressant. Also concerned about side effect of her eye medication which she is not taking, and expressed interest in changing eye doctors. Doesn't understand why her eye medication might cause cataracts. Noted trouble hearing but doesn't appear changed from last visit. Continues to wear wrist brace for minor fracture earlier in the year, but was recently cleared by ortho to stop wearing brace per ortho.     PMH:   Hypertension with concentric remodeling on echo , normal LV function.   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   Cervical spondylosis.  Mild Bilateral Carotid Artery Disease - minimal on ultrasound in Cardiology .  Osteopenia.   COPD, mild on PFT's .  Hyperbilirubinemia, mild, chronic.   Hearing loss.  Lung Micronodules and 2 cm Left Adrenal nodule likely benign on CT Chest/Abdomen/Pelvis .      PSH: Tonsillectomy. Hysterectomy and BSO. Lasik right eye. Bilateral Cataract  "extraction with lens implants.     Mammogram normal 5/19 - declines repeat. BMD stable 8/21. EKG normal 3/18. CXR clear 8/21. Colonoscopy normal >10 yrs ago. Eye exam 11/23 Dr. Hill. Vaccines reviewed.      Social: smoking about 2-3x/week, mostly socially. 1-2 light beers nightly. , adult son lives locally - he is a heart and kidney transplant recipient. Retired consumer credit counselor.      FMH: HTN in father, dementia in mother, pancreatic cancer in sister.      NKDA.      Medications: list reviewed and reconciled.      Follow-up  Associated symptoms include coughing (on occasion, usually when she smokes). Pertinent negatives include no abdominal pain, arthralgias, chest pain, headaches, myalgias, nausea, vomiting or weakness.     Review of Systems   Constitutional:  Positive for activity change, appetite change and unexpected weight change.   HENT:  Positive for hearing loss.    Eyes: Negative.    Respiratory:  Positive for cough (on occasion, usually when she smokes). Negative for chest tightness, shortness of breath and wheezing.    Cardiovascular:  Negative for chest pain.   Gastrointestinal:  Negative for abdominal distention, abdominal pain, blood in stool, constipation, diarrhea, nausea and vomiting.   Genitourinary:  Negative for difficulty urinating, dysuria and hematuria.   Musculoskeletal:  Negative for arthralgias and myalgias.   Skin: Negative.    Neurological:  Negative for dizziness, syncope, weakness, light-headedness and headaches.   Psychiatric/Behavioral:  Positive for dysphoric mood and sleep disturbance. Negative for agitation and confusion. The patient is not nervous/anxious.      Objective:     Vitals:    07/01/24 1033   BP: 120/72   BP Location: Right arm   Patient Position: Sitting   Pulse: 78   Temp: 97.7 °F (36.5 °C)   TempSrc: Oral   SpO2: 97%   Weight: 53.8 kg (118 lb 9.7 oz)   Typically 119-123.   Height: 5' 7" (1.702 m)      Physical Exam  Constitutional:       " Appearance: Normal appearance. She is normal weight.   HENT:      Head: Normocephalic and atraumatic.      Right Ear: External ear normal.      Left Ear: External ear normal.      Nose: Nose normal. No congestion.   Eyes:      Extraocular Movements: Extraocular movements intact.      Conjunctiva/sclera: Conjunctivae normal.   Cardiovascular:      Rate and Rhythm: Normal rate and regular rhythm.      Pulses: Normal pulses.      Heart sounds: Normal heart sounds.   Pulmonary:      Effort: Pulmonary effort is normal. No respiratory distress.      Breath sounds: Normal breath sounds. No wheezing, rhonchi or rales.   Abdominal:      General: Abdomen is flat. Bowel sounds are normal. There is no distension.      Palpations: Abdomen is soft.      Tenderness: There is no abdominal tenderness.   Musculoskeletal:         General: No tenderness. Normal range of motion.      Cervical back: Normal range of motion and neck supple.      Right lower leg: No edema.      Left lower leg: No edema.   Skin:     General: Skin is warm and dry.   Neurological:      General: No focal deficit present.      Mental Status: She is alert and oriented to person, place, and time. Mental status is at baseline.   Psychiatric:         Mood and Affect: Mood normal.         Behavior: Behavior normal.         Thought Content: Thought content normal.       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  -     CBC Without Differential; Future; Expected date: 07/01/2024  -     Basic Metabolic Panel; Future; Expected date: 07/01/2024  -     continue Amlodipine 5 mg recently refilled.    Chronic obstructive pulmonary disease, unspecified COPD type - asymptomatic.     Thoracic aorta atherosclerosis - continue Pravastatin.     Depression, unspecified depression type  -     Start Sertraline (ZOLOFT) 25 MG tablet; Take 1 tablet (25 mg total) by mouth once daily. For Depression.  Dispense: 30 tablet; Refill: 5    Multiple  lung nodules in high risk patient - one year follow up non-contrast Chest CT due 12/22/24.     Follow up in about 6 months (around 1/1/2025).    I hereby acknowledge that I am relying upon documentation authored by a medical student working under my supervision and further I hereby attest that I have verified the student documentation or findings by personally re-performing the physical exam and medical decision making activities of the Evaluation and Management service to be billed.    Indigo Schaefer

## 2024-09-16 DIAGNOSIS — E55.9 VITAMIN D DEFICIENCY: ICD-10-CM

## 2024-09-16 NOTE — TELEPHONE ENCOUNTER
Care Due:                  Date            Visit Type   Department     Provider  --------------------------------------------------------------------------------                                EP -                              PRIMARY      [facility]   Indigo Miller  Last Visit: 07-      CARE ([facility])   CARE           [place]                              EP -                              PRIMARY      [facility]   Indigo Miller  Next Visit: 01-      CARE ([facility])   CARE           [place]                                                            Last  Test          Frequency    Reason                     Performed    Due Date  --------------------------------------------------------------------------------    CMP.........  12 months..  pravastatin..............  12- 11-    Lipid Panel.  12 months..  pravastatin..............  12- 11-    Vitamin D...  12 months..  ergocalciferol...........  05- 05-    [facility] Embedded Care Due Messages. Reference number: [id number].   9/16/2024 3:37:05 PM CDT

## 2024-09-17 RX ORDER — ERGOCALCIFEROL 1.25 MG/1
50000 CAPSULE ORAL
Qty: 12 CAPSULE | Refills: 1 | Status: SHIPPED | OUTPATIENT
Start: 2024-09-17

## 2024-10-06 ENCOUNTER — HOSPITAL ENCOUNTER (EMERGENCY)
Facility: OTHER | Age: 84
Discharge: HOME | End: 2024-10-06
Attending: EMERGENCY MEDICINE
Payer: MEDICARE

## 2024-10-06 VITALS
HEIGHT: 67 IN | TEMPERATURE: 99 F | RESPIRATION RATE: 16 BRPM | SYSTOLIC BLOOD PRESSURE: 129 MMHG | BODY MASS INDEX: 18.58 KG/M2 | DIASTOLIC BLOOD PRESSURE: 57 MMHG | HEART RATE: 90 BPM | OXYGEN SATURATION: 96 %
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
## 2024-10-06 DIAGNOSIS — S32.010A COMPRESSION FRACTURE OF L1 VERTEBRA, INITIAL ENCOUNTER: Primary | ICD-10-CM

## 2024-10-06 DIAGNOSIS — S82.64XA CLOSED NONDISPLACED FRACTURE OF LATERAL MALLEOLUS OF RIGHT FIBULA, INITIAL ENCOUNTER: ICD-10-CM

## 2024-10-06 DIAGNOSIS — S92.351A CLOSED DISPLACED FRACTURE OF FIFTH METATARSAL BONE OF RIGHT FOOT, INITIAL ENCOUNTER: ICD-10-CM

## 2024-10-06 LAB
CREAT SERPL-MCNC: 0.5 MG/DL (ref 0.5–1.4)
SAMPLE: NORMAL

## 2024-10-06 PROCEDURE — 25000003 PHARM REV CODE 250: Performed by: EMERGENCY MEDICINE

## 2024-10-06 PROCEDURE — 25500020 PHARM REV CODE 255: Performed by: EMERGENCY MEDICINE

## 2024-10-06 PROCEDURE — 29515 APPLICATION SHORT LEG SPLINT: CPT | Mod: RT

## 2024-10-06 PROCEDURE — 99285 EMERGENCY DEPT VISIT HI MDM: CPT | Mod: 25

## 2024-10-06 RX ORDER — OXYCODONE AND ACETAMINOPHEN 5; 325 MG/1; MG/1
1 TABLET ORAL
Status: COMPLETED | OUTPATIENT
Start: 2024-10-06 | End: 2024-10-06

## 2024-10-06 RX ORDER — OXYCODONE AND ACETAMINOPHEN 5; 325 MG/1; MG/1
1 TABLET ORAL EVERY 6 HOURS PRN
Qty: 12 TABLET | Refills: 0 | Status: SHIPPED | OUTPATIENT
Start: 2024-10-06 | End: 2024-10-10 | Stop reason: SDUPTHER

## 2024-10-06 RX ADMIN — OXYCODONE HYDROCHLORIDE AND ACETAMINOPHEN 1 TABLET: 5; 325 TABLET ORAL at 06:10

## 2024-10-06 RX ADMIN — IOHEXOL 75 ML: 350 INJECTION, SOLUTION INTRAVENOUS at 05:10
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
## 2024-10-08 ENCOUNTER — PATIENT OUTREACH (OUTPATIENT)
Dept: EMERGENCY MEDICINE | Facility: OTHER | Age: 84
End: 2024-10-08
Payer: MEDICARE

## 2024-10-08 ENCOUNTER — TELEPHONE (OUTPATIENT)
Dept: PRIMARY CARE CLINIC | Facility: CLINIC | Age: 84
End: 2024-10-08
Payer: MEDICARE

## 2024-10-08 NOTE — PROGRESS NOTES
Patient was seen in the ED on 10/6/24. Phoned patient to assist with Post ED Discharge Navigation. Patient stated she will contact Orthopedics. Patient agreed to assistance scheduling a follow-up with PCP. In Basket message sent to PCP staff for scheduling assistance.  Enedelia Nation

## 2024-10-08 NOTE — TELEPHONE ENCOUNTER
----- Message from Enedelia Nation sent at 10/8/2024  3:40 PM CDT -----  Regarding: ED F/U  Good afternoon,  This pt was discharged from the ED on 10/6/24 and has orders to follow up with Dr Messer.  In compliance with Medicare 2+ Chronic Condition patient measure mandates, this patient requires a follow up appt within 7 days of ED visit d/c date. I am requesting scheduling assistance as you are booked, and I am unable to schedule pt an appt within 7 days. Thank you for your assistance. Please advise of appt date and time so that I can set an appt reminder and confirm with patient.  Enedelia Nation

## 2024-10-09 ENCOUNTER — TELEPHONE (OUTPATIENT)
Dept: ORTHOPEDICS | Facility: CLINIC | Age: 84
End: 2024-10-09
Payer: MEDICARE

## 2024-10-09 DIAGNOSIS — M51.360 DEGENERATION OF INTERVERTEBRAL DISC OF LUMBAR REGION WITH DISCOGENIC BACK PAIN: Primary | ICD-10-CM

## 2024-10-09 NOTE — TELEPHONE ENCOUNTER
Attempted to contact pt, no answer. Unable to LVM , mailbox full   ----- Message from Vidya sent at 10/9/2024  8:55 AM CDT -----  Regarding: Sooner Appt  Contact: Patient  Type:  Sooner Apoointment Request    Caller is requesting a sooner appointment.  Caller declined first available appointment listed below.  Caller will not accept being placed on the waitlist and is requesting a message be sent to doctor.  Name of Caller:Patient   When is the first available appointment?11/27/2024   Symptoms: Compression fracture of L1 vertebra, initial encounter  Would the patient rather a call back or a response via [facility]? Call back   Best Call Back Number:  [phone]  Additional Information: Patient was seen in ED on 10/06/2024 and was advised to follow up with ortho as soon as possible
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
## 2024-10-09 NOTE — TELEPHONE ENCOUNTER
I spoke with pt,schedule appointment on 10/10 @8:15. Pt,verbalized understanding      ----- Message from Mary sent at 10/9/2024  7:58 AM CDT -----       Nature of Call: requesting a appt change    Best Call Back Number: [phone]     Additional Information:  YOSELIN NAVARRO calling regarding Patient Advice for wanting to speak to Avani to see if the appt for tomorrow is still available so she can take it and be informed of the time to set up transportation, please call back to inform

## 2024-10-09 NOTE — TELEPHONE ENCOUNTER
I spoke with pt,she is scheduled to come in on 10/15/ @10:30 for her right foot fracture. Patient stated that she can not come in on tomorrow due to her not having a ride.      ----- Message from Med Assistant Hartmann sent at 10/9/2024  9:36 AM CDT -----  Regarding: FW: Sooner Appt  Contact: Patient    ----- Message -----  From: Vidya Asher  Sent: 10/9/2024   9:30 AM CDT  To: [facility] Ortho Triage  Subject: Sooner Appt                                      Type:  Sooner Apoointment Request    Caller is requesting a sooner appointment.  Caller declined first available appointment listed below.  Caller will not accept being placed on the waitlist and is requesting a message be sent to doctor.  Name of Caller:Patient   When is the first available appointment? 10/25/2024   Symptoms: Closed displaced fracture of fifth metatarsal bone of right foot, initial encounter    Closed nondisplaced fracture of lateral malleolus of right fibula, initial encounter    Would the patient rather a call back or a response via [facility]? Call back   Best Call Back Number:  [phone]  Additional Information: Patient was seen in ED on 10/06/2024 and was advised to follow up with ortho please assist

## 2024-10-10 ENCOUNTER — HOSPITAL ENCOUNTER (OUTPATIENT)
Dept: RADIOLOGY | Facility: HOSPITAL | Age: 84
Discharge: HOME | End: 2024-10-10
Attending: REGISTERED NURSE
Payer: MEDICARE

## 2024-10-10 ENCOUNTER — OFFICE VISIT (OUTPATIENT)
Dept: ORTHOPEDICS | Facility: CLINIC | Age: 84
End: 2024-10-10
Payer: MEDICARE

## 2024-10-10 VITALS
HEIGHT: 67 IN | WEIGHT: 118.63 LBS | WEIGHT: 118 LBS | BODY MASS INDEX: 18.62 KG/M2 | BODY MASS INDEX: 18.52 KG/M2 | HEIGHT: 67 IN

## 2024-10-10 DIAGNOSIS — M51.360 DEGENERATION OF INTERVERTEBRAL DISC OF LUMBAR REGION WITH DISCOGENIC BACK PAIN: ICD-10-CM

## 2024-10-10 DIAGNOSIS — S32.010A COMPRESSION FRACTURE OF L1 VERTEBRA, INITIAL ENCOUNTER: ICD-10-CM

## 2024-10-10 DIAGNOSIS — S92.301A CLOSED AVULSION FRACTURE OF METATARSAL BONE OF RIGHT FOOT, INITIAL ENCOUNTER: ICD-10-CM

## 2024-10-10 DIAGNOSIS — S82.839A AVULSION FRACTURE OF DISTAL END OF FIBULA: ICD-10-CM

## 2024-10-10 DIAGNOSIS — S93.491A SPRAIN OF ANTERIOR TALOFIBULAR LIGAMENT OF RIGHT ANKLE, INITIAL ENCOUNTER: Primary | ICD-10-CM

## 2024-10-10 DIAGNOSIS — S32.010S CLOSED COMPRESSION FRACTURE OF L1 VERTEBRA, SEQUELA: Primary | ICD-10-CM

## 2024-10-10 PROCEDURE — 72110 X-RAY EXAM L-2 SPINE 4/>VWS: CPT | Mod: 26,,, | Performed by: RADIOLOGY

## 2024-10-10 PROCEDURE — 72110 X-RAY EXAM L-2 SPINE 4/>VWS: CPT | Mod: TC

## 2024-10-10 PROCEDURE — 99999 PR PBB SHADOW E&M-EST. PATIENT-LVL III: CPT | Mod: PBBFAC,,, | Performed by: REGISTERED NURSE

## 2024-10-10 PROCEDURE — 99999 PR PBB SHADOW E&M-EST. PATIENT-LVL III: CPT | Mod: PBBFAC,,, | Performed by: ORTHOPAEDIC SURGERY

## 2024-10-10 RX ORDER — OXYCODONE AND ACETAMINOPHEN 5; 325 MG/1; MG/1
1 TABLET ORAL EVERY 8 HOURS PRN
Qty: 21 TABLET | Refills: 0 | Status: SHIPPED | OUTPATIENT
Start: 2024-10-10

## 2024-10-10 RX ORDER — TIZANIDINE 2 MG/1
2 TABLET ORAL EVERY 6 HOURS PRN
Qty: 60 TABLET | Refills: 1 | Status: SHIPPED | OUTPATIENT
Start: 2024-10-10 | End: 2024-11-09
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
## 2024-10-20 DIAGNOSIS — S32.010S CLOSED COMPRESSION FRACTURE OF L1 VERTEBRA, SEQUELA: ICD-10-CM

## 2024-10-22 RX ORDER — TIZANIDINE 2 MG/1
2 TABLET ORAL EVERY 6 HOURS PRN
Qty: 90 TABLET | Refills: 1 | Status: SHIPPED | OUTPATIENT
Start: 2024-10-22 | End: 2024-11-21

## 2024-11-29 ENCOUNTER — PATIENT OUTREACH (OUTPATIENT)
Dept: EMERGENCY MEDICINE | Facility: OTHER | Age: 84
End: 2024-11-29
Payer: MEDICARE
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
## 2024-12-05 DIAGNOSIS — I65.23 BILATERAL CAROTID ARTERY STENOSIS: ICD-10-CM

## 2024-12-05 DIAGNOSIS — Z86.73 OLD LACUNAR STROKE WITHOUT LATE EFFECT: ICD-10-CM

## 2024-12-05 DIAGNOSIS — I70.0 THORACIC AORTA ATHEROSCLEROSIS: ICD-10-CM

## 2024-12-05 NOTE — TELEPHONE ENCOUNTER
Care Due:                  Date            Visit Type   Department     Provider  --------------------------------------------------------------------------------                                EP -                              PRIMARY      [facility]   Indigo Miller  Last Visit: 07-      CARE ([facility])   CARE           [place]                              EP -                              PRIMARY      [facility]   Indigo Miller  Next Visit: 01-      CARE ([facility])   CARE           [place]                                                            Last  Test          Frequency    Reason                     Performed    Due Date  --------------------------------------------------------------------------------    CMP.........  12 months..  pravastatin..............  12- 11-    Lipid Panel.  12 months..  pravastatin..............  12- 11-    Vitamin D...  12 months..  ergocalciferol...........  05- 05-    [facility] Embedded Care Due Messages. Reference number: [id number].   12/05/2024 2:05:03 PM CST

## 2024-12-06 RX ORDER — CLOPIDOGREL BISULFATE 75 MG/1
75 TABLET ORAL
Qty: 90 TABLET | Refills: 0 | Status: SHIPPED | OUTPATIENT
Start: 2024-12-06

## 2024-12-06 RX ORDER — PRAVASTATIN SODIUM 10 MG/1
10 TABLET ORAL
Qty: 90 TABLET | Refills: 0 | Status: SHIPPED | OUTPATIENT
Start: 2024-12-06

## 2024-12-16 DIAGNOSIS — J98.8 CONGESTION OF RESPIRATORY TRACT: ICD-10-CM

## 2024-12-16 NOTE — TELEPHONE ENCOUNTER
No care due was identified.  [facility] Embedded Care Due Messages. Reference number: [id number].   12/16/2024 3:45:51 PM CST

## 2024-12-16 NOTE — TELEPHONE ENCOUNTER
LOV 07/01/24   LRF 1/28/24    ----- Message from Lima sent at 12/16/2024  1:39 PM CST -----  Contact: [phone] patient  Requesting an RX refill or new RX.    Is this a refill or new RX:     RX name and strength albuterol (VENTOLIN HFA) 90 mcg/actuation inhaler    Is this a 30 day or 90 day RX:     Pharmacy name and phone #   [id number] - [place], [place] - [address]  [address]  [place] [address]  Phone: [phone] Fax: [phone]         The doctors have asked that we provide their patients with the following 2 reminders -- prescription refills can take up to 72 hours, and a friendly reminder that in the future you can use your [facility] account to request refills: na

## 2024-12-17 RX ORDER — ALBUTEROL SULFATE 90 UG/1
2 INHALANT RESPIRATORY (INHALATION) EVERY 6 HOURS PRN
Qty: 18 G | Refills: 2 | Status: SHIPPED | OUTPATIENT
Start: 2024-12-17

## 2024-12-31 ENCOUNTER — PATIENT OUTREACH (OUTPATIENT)
Dept: EMERGENCY MEDICINE | Facility: HOSPITAL | Age: 84
End: 2024-12-31
Payer: MEDICARE

## 2024-12-31 NOTE — PROGRESS NOTES
Reminded patient of upcoming appointment on 1/2/24 at 9:30 with Dr Indigo Schaefer MD [facility], Primary Care [facility] [place] [address].  Enedelia Nation

## 2024-12-31 NOTE — PROGRESS NOTES
Phoned patient for follow-up. Patient was unavailable. Message left on voice mail urging patient to call if additional assistance is needed.  Enedelia Nation

## 2025-01-01 DIAGNOSIS — F32.A DEPRESSION, UNSPECIFIED DEPRESSION TYPE: ICD-10-CM

## 2025-01-01 NOTE — TELEPHONE ENCOUNTER
No care due was identified.  [facility] Embedded Care Due Messages. Reference number: [id number].   1/01/2025 12:22:45 AM CST

## 2025-01-02 ENCOUNTER — OFFICE VISIT (OUTPATIENT)
Dept: PRIMARY CARE CLINIC | Facility: CLINIC | Age: 85
End: 2025-01-02
Payer: MEDICARE

## 2025-01-02 ENCOUNTER — LAB VISIT (OUTPATIENT)
Dept: LAB | Facility: HOSPITAL | Age: 85
End: 2025-01-02
Attending: INTERNAL MEDICINE
Payer: MEDICARE

## 2025-01-02 ENCOUNTER — PATIENT OUTREACH (OUTPATIENT)
Dept: EMERGENCY MEDICINE | Facility: HOSPITAL | Age: 85
End: 2025-01-02
Payer: MEDICARE

## 2025-01-02 VITALS
HEART RATE: 84 BPM | SYSTOLIC BLOOD PRESSURE: 122 MMHG | DIASTOLIC BLOOD PRESSURE: 60 MMHG | HEIGHT: 67 IN | OXYGEN SATURATION: 97 % | WEIGHT: 112.44 LBS | BODY MASS INDEX: 17.65 KG/M2 | RESPIRATION RATE: 19 BRPM

## 2025-01-02 DIAGNOSIS — M81.0 AGE-RELATED OSTEOPOROSIS WITHOUT CURRENT PATHOLOGICAL FRACTURE: ICD-10-CM

## 2025-01-02 DIAGNOSIS — Z23 NEED FOR COVID-19 VACCINE: ICD-10-CM

## 2025-01-02 DIAGNOSIS — R91.8 MULTIPLE SUBSOLID LUNG NODULES LESS THAN 6 MM IN DIAMETER: ICD-10-CM

## 2025-01-02 DIAGNOSIS — Z23 INFLUENZA VACCINE NEEDED: ICD-10-CM

## 2025-01-02 DIAGNOSIS — I70.0 THORACIC AORTA ATHEROSCLEROSIS: ICD-10-CM
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
## 2025-01-02 DIAGNOSIS — F32.A DEPRESSION, UNSPECIFIED DEPRESSION TYPE: ICD-10-CM

## 2025-01-02 DIAGNOSIS — F17.200 TOBACCO USE DISORDER: ICD-10-CM

## 2025-01-02 DIAGNOSIS — J44.9 CHRONIC OBSTRUCTIVE PULMONARY DISEASE, UNSPECIFIED COPD TYPE: ICD-10-CM

## 2025-01-02 DIAGNOSIS — Z86.73 OLD LACUNAR STROKE WITHOUT LATE EFFECT: ICD-10-CM

## 2025-01-02 DIAGNOSIS — S82.891D CLOSED FRACTURE OF RIGHT ANKLE WITH ROUTINE HEALING, SUBSEQUENT ENCOUNTER: ICD-10-CM

## 2025-01-02 DIAGNOSIS — S32.010D CLOSED WEDGE COMPRESSION FRACTURE OF L1 VERTEBRA WITH ROUTINE HEALING, SUBSEQUENT ENCOUNTER: ICD-10-CM

## 2025-01-02 DIAGNOSIS — E55.9 VITAMIN D DEFICIENCY: ICD-10-CM

## 2025-01-02 DIAGNOSIS — I11.9 HYPERTENSIVE HEART DISEASE WITHOUT HEART FAILURE: Primary | ICD-10-CM

## 2025-01-02 DIAGNOSIS — I11.9 HYPERTENSIVE HEART DISEASE WITHOUT HEART FAILURE: ICD-10-CM

## 2025-01-02 LAB
25(OH)D3+25(OH)D2 SERPL-MCNC: 35 NG/ML (ref 30–96)
ALBUMIN SERPL BCP-MCNC: 3.6 G/DL (ref 3.5–5.2)
ALP SERPL-CCNC: 70 U/L (ref 40–150)
ALT SERPL W/O P-5'-P-CCNC: 9 U/L (ref 10–44)
ANION GAP SERPL CALC-SCNC: 9 MMOL/L (ref 8–16)
AST SERPL-CCNC: 15 U/L (ref 10–40)
BILIRUB SERPL-MCNC: 1.1 MG/DL (ref 0.1–1)
BUN SERPL-MCNC: 7 MG/DL (ref 8–23)
CALCIUM SERPL-MCNC: 9.5 MG/DL (ref 8.7–10.5)
CHLORIDE SERPL-SCNC: 96 MMOL/L (ref 95–110)
CHOLEST SERPL-MCNC: 160 MG/DL (ref 120–199)
CHOLEST/HDLC SERPL: 1.8 {RATIO} (ref 2–5)
CO2 SERPL-SCNC: 31 MMOL/L (ref 23–29)
CREAT SERPL-MCNC: 0.7 MG/DL (ref 0.5–1.4)
ERYTHROCYTE [DISTWIDTH] IN BLOOD BY AUTOMATED COUNT: 14.6 % (ref 11.5–14.5)
EST. GFR  (NO RACE VARIABLE): >60 ML/MIN/1.73 M^2
GLUCOSE SERPL-MCNC: 90 MG/DL (ref 70–110)
HCT VFR BLD AUTO: 39.3 % (ref 37–48.5)
HDLC SERPL-MCNC: 91 MG/DL (ref 40–75)
HDLC SERPL: 56.9 % (ref 20–50)
HGB BLD-MCNC: 13.3 G/DL (ref 12–16)
LDLC SERPL CALC-MCNC: 55 MG/DL (ref 63–159)
MCH RBC QN AUTO: 30.8 PG (ref 27–31)
MCHC RBC AUTO-ENTMCNC: 33.8 G/DL (ref 32–36)
MCV RBC AUTO: 91 FL (ref 82–98)
NONHDLC SERPL-MCNC: 69 MG/DL
PLATELET # BLD AUTO: 287 K/UL (ref 150–450)
PMV BLD AUTO: 9.6 FL (ref 9.2–12.9)
POTASSIUM SERPL-SCNC: 3.8 MMOL/L (ref 3.5–5.1)
PROT SERPL-MCNC: 6.8 G/DL (ref 6–8.4)
RBC # BLD AUTO: 4.32 M/UL (ref 4–5.4)
SODIUM SERPL-SCNC: 136 MMOL/L (ref 136–145)
TRIGL SERPL-MCNC: 70 MG/DL (ref 30–150)
WBC # BLD AUTO: 6.26 K/UL (ref 3.9–12.7)

## 2025-01-02 PROCEDURE — 1101F PT FALLS ASSESS-DOCD LE1/YR: CPT | Mod: CPTII,S$GLB,, | Performed by: INTERNAL MEDICINE

## 2025-01-02 PROCEDURE — 3074F SYST BP LT 130 MM HG: CPT | Mod: CPTII,S$GLB,, | Performed by: INTERNAL MEDICINE

## 2025-01-02 PROCEDURE — 80061 LIPID PANEL: CPT | Performed by: INTERNAL MEDICINE

## 2025-01-02 PROCEDURE — 36415 COLL VENOUS BLD VENIPUNCTURE: CPT | Mod: PN | Performed by: INTERNAL MEDICINE

## 2025-01-02 PROCEDURE — 99999 PR PBB SHADOW E&M-EST. PATIENT-LVL III: CPT | Mod: PBBFAC,,, | Performed by: INTERNAL MEDICINE

## 2025-01-02 PROCEDURE — 1159F MED LIST DOCD IN RCRD: CPT | Mod: CPTII,S$GLB,, | Performed by: INTERNAL MEDICINE

## 2025-01-02 PROCEDURE — 82306 VITAMIN D 25 HYDROXY: CPT | Performed by: INTERNAL MEDICINE

## 2025-01-02 PROCEDURE — 1160F RVW MEDS BY RX/DR IN RCRD: CPT | Mod: CPTII,S$GLB,, | Performed by: INTERNAL MEDICINE

## 2025-01-02 PROCEDURE — 99215 OFFICE O/P EST HI 40 MIN: CPT | Mod: S$GLB,,, | Performed by: INTERNAL MEDICINE

## 2025-01-02 PROCEDURE — 80053 COMPREHEN METABOLIC PANEL: CPT | Performed by: INTERNAL MEDICINE

## 2025-01-02 PROCEDURE — 3078F DIAST BP <80 MM HG: CPT | Mod: CPTII,S$GLB,, | Performed by: INTERNAL MEDICINE

## 2025-01-02 PROCEDURE — 85027 COMPLETE CBC AUTOMATED: CPT | Performed by: INTERNAL MEDICINE

## 2025-01-02 PROCEDURE — 1125F AMNT PAIN NOTED PAIN PRSNT: CPT | Mod: CPTII,S$GLB,, | Performed by: INTERNAL MEDICINE

## 2025-01-02 PROCEDURE — 3288F FALL RISK ASSESSMENT DOCD: CPT | Mod: CPTII,S$GLB,, | Performed by: INTERNAL MEDICINE

## 2025-01-02 RX ORDER — CLOPIDOGREL BISULFATE 75 MG/1
75 TABLET ORAL DAILY
Qty: 90 TABLET | Refills: 2 | Status: SHIPPED | OUTPATIENT
Start: 2025-01-02

## 2025-01-02 RX ORDER — AMLODIPINE BESYLATE 5 MG/1
5 TABLET ORAL DAILY
Qty: 90 TABLET | Refills: 2 | Status: SHIPPED | OUTPATIENT
Start: 2025-01-02

## 2025-01-02 RX ORDER — SERTRALINE HYDROCHLORIDE 25 MG/1
25 TABLET, FILM COATED ORAL DAILY
Qty: 90 TABLET | Refills: 2 | Status: SHIPPED | OUTPATIENT
Start: 2025-01-02

## 2025-01-02 RX ORDER — SERTRALINE HYDROCHLORIDE 25 MG/1
25 TABLET, FILM COATED ORAL DAILY
Qty: 90 TABLET | Refills: 1 | OUTPATIENT
Start: 2025-01-02
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
## 2025-01-02 NOTE — PROGRESS NOTES
Patient left voice mail requesting a call back to clarify appointment time. Returned patients call. Informed patient appointment with Dr Schaefer today at 9:30. Patient acknowledge understanding.  Enedelia Nation

## 2025-01-05 DIAGNOSIS — I70.0 THORACIC AORTA ATHEROSCLEROSIS: ICD-10-CM

## 2025-01-05 DIAGNOSIS — E55.9 VITAMIN D DEFICIENCY: ICD-10-CM

## 2025-01-05 DIAGNOSIS — I65.23 BILATERAL CAROTID ARTERY STENOSIS: ICD-10-CM

## 2025-01-05 RX ORDER — ERGOCALCIFEROL 1.25 MG/1
50000 CAPSULE ORAL
Qty: 12 CAPSULE | Refills: 2 | Status: SHIPPED | OUTPATIENT
Start: 2025-01-05

## 2025-01-05 RX ORDER — PRAVASTATIN SODIUM 10 MG/1
10 TABLET ORAL DAILY
Qty: 90 TABLET | Refills: 2 | Status: SHIPPED | OUTPATIENT
Start: 2025-01-05

## 2025-01-30 ENCOUNTER — HOSPITAL ENCOUNTER (OUTPATIENT)
Dept: RADIOLOGY | Facility: OTHER | Age: 85
Discharge: HOME | End: 2025-01-30
Attending: INTERNAL MEDICINE
Payer: MEDICARE

## 2025-01-30 DIAGNOSIS — M81.0 AGE-RELATED OSTEOPOROSIS WITHOUT CURRENT PATHOLOGICAL FRACTURE: ICD-10-CM

## 2025-01-30 DIAGNOSIS — S32.010D CLOSED WEDGE COMPRESSION FRACTURE OF L1 VERTEBRA WITH ROUTINE HEALING, SUBSEQUENT ENCOUNTER: ICD-10-CM

## 2025-01-30 DIAGNOSIS — S82.891D CLOSED FRACTURE OF RIGHT ANKLE WITH ROUTINE HEALING, SUBSEQUENT ENCOUNTER: ICD-10-CM

## 2025-01-30 PROCEDURE — 77080 DXA BONE DENSITY AXIAL: CPT | Mod: TC

## 2025-01-30 PROCEDURE — 77080 DXA BONE DENSITY AXIAL: CPT | Mod: 26,,, | Performed by: RADIOLOGY

## 2025-03-16 ENCOUNTER — RESULTS FOLLOW-UP (OUTPATIENT)
Dept: PRIMARY CARE CLINIC | Facility: CLINIC | Age: 85
End: 2025-03-16

## 2025-06-20 ENCOUNTER — OFFICE VISIT (OUTPATIENT)
Dept: PRIMARY CARE CLINIC | Facility: CLINIC | Age: 85
End: 2025-06-20
Payer: MEDICARE

## 2025-06-20 VITALS
WEIGHT: 112 LBS | SYSTOLIC BLOOD PRESSURE: 118 MMHG | DIASTOLIC BLOOD PRESSURE: 62 MMHG | BODY MASS INDEX: 17.58 KG/M2 | RESPIRATION RATE: 19 BRPM | HEIGHT: 67 IN | OXYGEN SATURATION: 98 % | HEART RATE: 60 BPM

## 2025-06-20 DIAGNOSIS — I70.0 THORACIC AORTA ATHEROSCLEROSIS: ICD-10-CM

## 2025-06-20 DIAGNOSIS — E55.9 VITAMIN D DEFICIENCY: ICD-10-CM

## 2025-06-20 DIAGNOSIS — F32.A DEPRESSION, UNSPECIFIED DEPRESSION TYPE: ICD-10-CM

## 2025-06-20 DIAGNOSIS — R10.30 DISCOMFORT OF GROIN, UNSPECIFIED LATERALITY: ICD-10-CM

## 2025-06-20 DIAGNOSIS — M85.80 OSTEOPENIA, UNSPECIFIED LOCATION: ICD-10-CM

## 2025-06-20 DIAGNOSIS — I11.9 HYPERTENSIVE HEART DISEASE WITHOUT HEART FAILURE: Primary | ICD-10-CM

## 2025-06-20 PROCEDURE — 3078F DIAST BP <80 MM HG: CPT | Mod: CPTII,S$GLB,, | Performed by: INTERNAL MEDICINE

## 2025-06-20 PROCEDURE — 1101F PT FALLS ASSESS-DOCD LE1/YR: CPT | Mod: CPTII,S$GLB,, | Performed by: INTERNAL MEDICINE

## 2025-06-20 PROCEDURE — 1160F RVW MEDS BY RX/DR IN RCRD: CPT | Mod: CPTII,S$GLB,, | Performed by: INTERNAL MEDICINE

## 2025-06-20 PROCEDURE — 1126F AMNT PAIN NOTED NONE PRSNT: CPT | Mod: CPTII,S$GLB,, | Performed by: INTERNAL MEDICINE

## 2025-06-20 PROCEDURE — 1159F MED LIST DOCD IN RCRD: CPT | Mod: CPTII,S$GLB,, | Performed by: INTERNAL MEDICINE

## 2025-06-20 PROCEDURE — 99999 PR PBB SHADOW E&M-EST. PATIENT-LVL IV: CPT | Mod: PBBFAC,,, | Performed by: INTERNAL MEDICINE

## 2025-06-20 PROCEDURE — 99214 OFFICE O/P EST MOD 30 MIN: CPT | Mod: S$GLB,,, | Performed by: INTERNAL MEDICINE

## 2025-06-20 PROCEDURE — 3074F SYST BP LT 130 MM HG: CPT | Mod: CPTII,S$GLB,, | Performed by: INTERNAL MEDICINE

## 2025-06-20 PROCEDURE — 3288F FALL RISK ASSESSMENT DOCD: CPT | Mod: CPTII,S$GLB,, | Performed by: INTERNAL MEDICINE

## 2025-06-20 RX ORDER — KETOROLAC TROMETHAMINE 5 MG/ML
1 SOLUTION OPHTHALMIC 4 TIMES DAILY
COMMUNITY
Start: 2025-06-15

## 2025-06-20 RX ORDER — PREDNISOLONE ACETATE 10 MG/ML
1 SUSPENSION/ DROPS OPHTHALMIC 2 TIMES DAILY
COMMUNITY
Start: 2025-05-31

## 2025-06-20 NOTE — PROGRESS NOTES
Subjective     Patient ID: Jessenia Da Silva is a 85 y.o. female.    Chief Complaint: Follow-up    Last seen 5 months ago. Returns for follow up. Taking daily meds as prescribed including Sertraline. Complains of feeling fatigued with decreased appetite. She eats well when in the company of her family, but not much when she is alone.     PMH:   Hypertension with concentric remodeling on echo , normal LV function.   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   Cervical spondylosis.  Mild Bilateral Carotid Artery Disease - minimal on ultrasound in Cardiology .  Osteopenia.   COPD, mild on PFT's .  Hyperbilirubinemia, mild, chronic.   Hearing loss, says she's seen an outside ENT.   Lung Micronodules and stable 2 cm Left Adrenal nodule likely benign on CT Chest/Abdomen/Pelvis .      PSH: Tonsillectomy. Hysterectomy and BSO. Lasik right eye. Bilateral Cataract extraction with lens implants.     Mammogram normal  - declines repeat. BMD  T-1.8. EKG normal 3/18. CXR clear . Colonoscopy normal >10 yrs ago. Eye exam  Dr. Hill. Vaccines reviewed.      Social: smoking about 2-3x/week, mostly socially. 1-2 light beers nightly. , adult son lives locally - he is a heart and kidney transplant recipient. Retired consumer credit counselor.      FMH: HTN in father, dementia in mother, pancreatic cancer in sister.      NKDA.      Medications: list reviewed and reconciled.        Review of Systems   Constitutional:  Negative for chills, diaphoresis and fever.   HENT:  Negative for nasal congestion, rhinorrhea and trouble swallowing.    Eyes:  Negative for visual disturbance.   Respiratory:  Negative for cough, chest tightness and shortness of breath.    Cardiovascular:  Negative for chest pain, palpitations and leg swelling.   Gastrointestinal:  Negative for abdominal pain, blood in stool, diarrhea, nausea and  "vomiting.   Genitourinary:  Negative for dysuria, frequency and hematuria.        Has concerns about perceived enlargement of clitoris which may be more prominent due to labial atrophy, referring to Gyn for eval.    Musculoskeletal:  Negative for arthralgias, gait problem and myalgias.   Integumentary:  Negative for rash and wound.   Neurological:  Negative for dizziness, tremors, seizures, syncope, facial asymmetry, speech difficulty, weakness, headaches and coordination difficulties.   Psychiatric/Behavioral:  Positive for dysphoric mood. Negative for agitation, behavioral problems, confusion and hallucinations. The patient is not nervous/anxious.           Objective   Vitals:    06/20/25 1536   BP: 118/62   BP Location: Right arm   Patient Position: Sitting   Pulse: 60   Resp: 19   SpO2: 98%   Weight: 50.8 kg (111 lb 15.9 oz)       From 112.4 five months ago.   Height: 5' 7" (1.702 m)      Physical Exam  Constitutional:       General: She is not in acute distress.     Appearance: She is not ill-appearing.      Comments: Appropriately groomed, ambulatory without aid, here alone.    HENT:      Head: Normocephalic and atraumatic.      Nose: Nose normal. No congestion.      Mouth/Throat:      Mouth: Mucous membranes are moist.      Pharynx: Oropharynx is clear.   Eyes:      Extraocular Movements: Extraocular movements intact.      Conjunctiva/sclera: Conjunctivae normal.   Cardiovascular:      Rate and Rhythm: Normal rate and regular rhythm.   Pulmonary:      Effort: Pulmonary effort is normal. No respiratory distress.      Breath sounds: Normal breath sounds. No wheezing, rhonchi or rales.   Abdominal:      General: Bowel sounds are normal. There is no distension.      Palpations: Abdomen is soft.      Tenderness: There is no abdominal tenderness.   Musculoskeletal:         General: Normal range of motion.      Right lower leg: No edema.      Left lower leg: No edema.   Skin:     General: Skin is warm and dry.      " Coloration: Skin is not pale.   Neurological:      Mental Status: She is alert and oriented to person, place, and time.      Cranial Nerves: No cranial nerve deficit.      Coordination: Coordination normal.      Gait: Gait normal.   Psychiatric:         Mood and Affect: Mood normal.         Behavior: Behavior normal.         Thought Content: Thought content normal.         Judgment: Judgment normal.       No visits with results within 3 Week(s) from this visit.   Latest known visit with results is:   Lab Visit on 01/02/2025   Component Date Value    WBC 01/02/2025 6.26     RBC 01/02/2025 4.32     Hemoglobin 01/02/2025 13.3     Hematocrit 01/02/2025 39.3     MCV 01/02/2025 91     MCH 01/02/2025 30.8     MCHC 01/02/2025 33.8     RDW 01/02/2025 14.6 (H)     Platelets 01/02/2025 287     MPV 01/02/2025 9.6     Sodium 01/02/2025 136     Potassium 01/02/2025 3.8     Chloride 01/02/2025 96     CO2 01/02/2025 31 (H)     Glucose 01/02/2025 90     BUN 01/02/2025 7 (L)     Creatinine 01/02/2025 0.7     Calcium 01/02/2025 9.5     Total Protein 01/02/2025 6.8     Albumin 01/02/2025 3.6     Total Bilirubin 01/02/2025 1.1 (H)     Alkaline Phosphatase 01/02/2025 70     AST 01/02/2025 15     ALT 01/02/2025 9 (L)     eGFR 01/02/2025 >60.0     Anion Gap 01/02/2025 9     Cholesterol 01/02/2025 160     Triglycerides 01/02/2025 70     HDL 01/02/2025 91 (H)     LDL Cholesterol 01/02/2025 55.0 (L)     HDL/Cholesterol Ratio 01/02/2025 56.9 (H)     Total Cholesterol/HDL Ra* 01/02/2025 1.8 (L)     Non-HDL Cholesterol 01/02/2025 69     Vit D, 25-Hydroxy 01/02/2025 35         Assessment and Plan     1. Hypertensive heart disease without heart failure       -   Controlled on Amlodipine 5 mg daily.    2. Thoracic aorta atherosclerosis       -   Tolerating low dose Pravastatin 10 mg daily.    3. Osteopenia, unspecified location       -   Stable, continue Calcium, Vitamin D.    4. Vitamin D deficiency       -   Improved on weekly supplement.    5.  Depression, unspecified depression type       -   Mild, stable on Sertraline 25 mg, she is not requesting a change in management.    6. Discomfort of groin, unspecified laterality  -  Ambulatory referral/consult to Gynecology; Future; Expected date: 06/27/2025     Overall clinically stable, not losing weight, no specific somatic problems. Labs not repeated today.   Follow up in about 5 months (around 11/20/2025).

## 2025-07-18 ENCOUNTER — OFFICE VISIT (OUTPATIENT)
Dept: OBSTETRICS AND GYNECOLOGY | Facility: CLINIC | Age: 85
End: 2025-07-18
Payer: MEDICARE

## 2025-07-18 ENCOUNTER — LAB VISIT (OUTPATIENT)
Dept: LAB | Facility: HOSPITAL | Age: 85
End: 2025-07-18
Attending: NURSE PRACTITIONER
Payer: MEDICARE

## 2025-07-18 VITALS
SYSTOLIC BLOOD PRESSURE: 122 MMHG | BODY MASS INDEX: 17.33 KG/M2 | WEIGHT: 110.69 LBS | DIASTOLIC BLOOD PRESSURE: 66 MMHG

## 2025-07-18 DIAGNOSIS — N90.89 ENLARGED CLITORIS: ICD-10-CM

## 2025-07-18 DIAGNOSIS — Z01.411 ENCNTR FOR GYN EXAM (GENERAL) (ROUTINE) W ABNORMAL FINDINGS: Primary | ICD-10-CM

## 2025-07-18 DIAGNOSIS — Z12.39 ENCOUNTER FOR SCREENING BREAST EXAMINATION: ICD-10-CM

## 2025-07-18 DIAGNOSIS — Z12.31 ENCOUNTER FOR SCREENING MAMMOGRAM FOR BREAST CANCER: ICD-10-CM

## 2025-07-18 DIAGNOSIS — N95.2 VAGINAL ATROPHY: ICD-10-CM

## 2025-07-18 LAB
CREAT SERPL-MCNC: 0.7 MG/DL (ref 0.5–1.4)
GFR SERPLBLD CREATININE-BSD FMLA CKD-EPI: >60 ML/MIN/1.73/M2

## 2025-07-18 PROCEDURE — 82565 ASSAY OF CREATININE: CPT

## 2025-07-18 PROCEDURE — 99999 PR PBB SHADOW E&M-EST. PATIENT-LVL III: CPT | Mod: PBBFAC,,, | Performed by: NURSE PRACTITIONER

## 2025-07-18 PROCEDURE — 36415 COLL VENOUS BLD VENIPUNCTURE: CPT | Mod: PN
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
## 2025-07-20 DIAGNOSIS — I65.23 BILATERAL CAROTID ARTERY STENOSIS: ICD-10-CM

## 2025-07-20 DIAGNOSIS — I70.0 THORACIC AORTA ATHEROSCLEROSIS: ICD-10-CM

## 2025-07-20 RX ORDER — PRAVASTATIN SODIUM 10 MG/1
10 TABLET ORAL
Qty: 90 TABLET | Refills: 1 | Status: SHIPPED | OUTPATIENT
Start: 2025-07-20

## 2025-07-20 NOTE — TELEPHONE ENCOUNTER
No care due was identified.  [facility] Embedded Care Due Messages. Reference number: [id number].   7/20/2025 12:25:19 AM CDT

## 2025-07-20 NOTE — TELEPHONE ENCOUNTER
Refill Decision Note   Jessenia Avinash  is requesting a refill authorization.  Brief Assessment and Rationale for Refill:  Approve     Medication Therapy Plan:        Comments:     Note composed:3:56 PM 07/20/2025

## 2025-08-19 ENCOUNTER — LAB VISIT (OUTPATIENT)
Dept: LAB | Facility: HOSPITAL | Age: 85
End: 2025-08-19
Attending: INTERNAL MEDICINE
Payer: MEDICARE

## 2025-08-19 ENCOUNTER — OFFICE VISIT (OUTPATIENT)
Dept: PRIMARY CARE CLINIC | Facility: CLINIC | Age: 85
End: 2025-08-19
Payer: MEDICARE

## 2025-08-19 VITALS
WEIGHT: 111.31 LBS | RESPIRATION RATE: 19 BRPM | HEART RATE: 80 BPM | DIASTOLIC BLOOD PRESSURE: 62 MMHG | OXYGEN SATURATION: 98 % | SYSTOLIC BLOOD PRESSURE: 104 MMHG | BODY MASS INDEX: 17.47 KG/M2 | HEIGHT: 67 IN

## 2025-08-19 DIAGNOSIS — Z86.73 OLD LACUNAR STROKE WITHOUT LATE EFFECT: ICD-10-CM

## 2025-08-19 DIAGNOSIS — F32.A DEPRESSION, UNSPECIFIED DEPRESSION TYPE: ICD-10-CM

## 2025-08-19 DIAGNOSIS — J44.9 CHRONIC OBSTRUCTIVE PULMONARY DISEASE, UNSPECIFIED COPD TYPE: ICD-10-CM

## 2025-08-19 DIAGNOSIS — E55.9 VITAMIN D DEFICIENCY: ICD-10-CM

## 2025-08-19 DIAGNOSIS — R42 DIZZINESS: Primary | ICD-10-CM

## 2025-08-19 DIAGNOSIS — R42 DIZZINESS: ICD-10-CM

## 2025-08-19 DIAGNOSIS — I11.9 HYPERTENSIVE HEART DISEASE WITHOUT HEART FAILURE: ICD-10-CM

## 2025-08-19 LAB
ABSOLUTE EOSINOPHIL (OHS): 0.15 K/UL
ABSOLUTE MONOCYTE (OHS): 0.88 K/UL (ref 0.3–1)
ABSOLUTE NEUTROPHIL COUNT (OHS): 4.2 K/UL (ref 1.8–7.7)
ALBUMIN SERPL BCP-MCNC: 3.8 G/DL (ref 3.5–5.2)
ALP SERPL-CCNC: 67 UNIT/L (ref 40–150)
ALT SERPL W/O P-5'-P-CCNC: 16 UNIT/L (ref 0–55)
ANION GAP (OHS): 11 MMOL/L (ref 8–16)
AST SERPL-CCNC: 24 UNIT/L (ref 0–50)
BASOPHILS # BLD AUTO: 0.05 K/UL
BASOPHILS NFR BLD AUTO: 0.7 %
BILIRUB SERPL-MCNC: 1 MG/DL (ref 0.1–1)
BILIRUB UR QL STRIP.AUTO: NEGATIVE
BUN SERPL-MCNC: 9 MG/DL (ref 8–23)
CALCIUM SERPL-MCNC: 9.5 MG/DL (ref 8.7–10.5)
CHLORIDE SERPL-SCNC: 92 MMOL/L (ref 95–110)
CLARITY UR: CLEAR
CO2 SERPL-SCNC: 28 MMOL/L (ref 23–29)
COLOR UR AUTO: YELLOW
CREAT SERPL-MCNC: 0.6 MG/DL (ref 0.5–1.4)
ERYTHROCYTE [DISTWIDTH] IN BLOOD BY AUTOMATED COUNT: 13.7 % (ref 11.5–14.5)
GFR SERPLBLD CREATININE-BSD FMLA CKD-EPI: >60 ML/MIN/1.73/M2
GLUCOSE SERPL-MCNC: 59 MG/DL (ref 70–110)
GLUCOSE UR QL STRIP: NEGATIVE
HCT VFR BLD AUTO: 37.7 % (ref 37–48.5)
HGB BLD-MCNC: 13 GM/DL (ref 12–16)
HGB UR QL STRIP: NEGATIVE
IMM GRANULOCYTES # BLD AUTO: 0.02 K/UL (ref 0–0.04)
IMM GRANULOCYTES NFR BLD AUTO: 0.3 % (ref 0–0.5)
KETONES UR QL STRIP: NEGATIVE
LEUKOCYTE ESTERASE UR QL STRIP: NEGATIVE
LYMPHOCYTES # BLD AUTO: 1.84 K/UL (ref 1–4.8)
MCH RBC QN AUTO: 32 PG (ref 27–31)
MCHC RBC AUTO-ENTMCNC: 34.5 G/DL (ref 32–36)
MCV RBC AUTO: 93 FL (ref 82–98)
NITRITE UR QL STRIP: NEGATIVE
NUCLEATED RBC (/100WBC) (OHS): 0 /100 WBC
PH UR STRIP: 7 [PH]
PLATELET # BLD AUTO: 272 K/UL (ref 150–450)
PMV BLD AUTO: 9.5 FL (ref 9.2–12.9)
POTASSIUM SERPL-SCNC: 3.8 MMOL/L (ref 3.5–5.1)
PROT SERPL-MCNC: 6.6 GM/DL (ref 6–8.4)
PROT UR QL STRIP: NEGATIVE
RBC # BLD AUTO: 4.06 M/UL (ref 4–5.4)
RELATIVE EOSINOPHIL (OHS): 2.1 %
RELATIVE LYMPHOCYTE (OHS): 25.8 % (ref 18–48)
RELATIVE MONOCYTE (OHS): 12.3 % (ref 4–15)
RELATIVE NEUTROPHIL (OHS): 58.8 % (ref 38–73)
SODIUM SERPL-SCNC: 131 MMOL/L (ref 136–145)
SP GR UR STRIP: 1.01
UROBILINOGEN UR STRIP-ACNC: NEGATIVE EU/DL
WBC # BLD AUTO: 7.14 K/UL (ref 3.9–12.7)

## 2025-08-19 PROCEDURE — 3074F SYST BP LT 130 MM HG: CPT | Mod: CPTII,S$GLB,, | Performed by: INTERNAL MEDICINE

## 2025-08-19 PROCEDURE — 1101F PT FALLS ASSESS-DOCD LE1/YR: CPT | Mod: CPTII,S$GLB,, | Performed by: INTERNAL MEDICINE

## 2025-08-19 PROCEDURE — 85025 COMPLETE CBC W/AUTO DIFF WBC: CPT

## 2025-08-19 PROCEDURE — 99214 OFFICE O/P EST MOD 30 MIN: CPT | Mod: S$GLB,,, | Performed by: INTERNAL MEDICINE

## 2025-08-19 PROCEDURE — 3078F DIAST BP <80 MM HG: CPT | Mod: CPTII,S$GLB,, | Performed by: INTERNAL MEDICINE

## 2025-08-19 PROCEDURE — 1159F MED LIST DOCD IN RCRD: CPT | Mod: CPTII,S$GLB,, | Performed by: INTERNAL MEDICINE

## 2025-08-19 PROCEDURE — 36415 COLL VENOUS BLD VENIPUNCTURE: CPT | Mod: PN

## 2025-08-19 PROCEDURE — 81003 URINALYSIS AUTO W/O SCOPE: CPT | Performed by: INTERNAL MEDICINE

## 2025-08-19 PROCEDURE — 1160F RVW MEDS BY RX/DR IN RCRD: CPT | Mod: CPTII,S$GLB,, | Performed by: INTERNAL MEDICINE

## 2025-08-19 PROCEDURE — 99999 PR PBB SHADOW E&M-EST. PATIENT-LVL III: CPT | Mod: PBBFAC,,, | Performed by: INTERNAL MEDICINE

## 2025-08-19 PROCEDURE — 3288F FALL RISK ASSESSMENT DOCD: CPT | Mod: CPTII,S$GLB,, | Performed by: INTERNAL MEDICINE

## 2025-08-19 PROCEDURE — 1126F AMNT PAIN NOTED NONE PRSNT: CPT | Mod: CPTII,S$GLB,, | Performed by: INTERNAL MEDICINE

## 2025-08-19 PROCEDURE — 80053 COMPREHEN METABOLIC PANEL: CPT

## 2025-08-19 RX ORDER — ERGOCALCIFEROL 1.25 MG/1
50000 CAPSULE ORAL
Qty: 12 CAPSULE | Refills: 2 | Status: SHIPPED | OUTPATIENT
Start: 2025-08-19

## 2025-08-19 RX ORDER — CLOPIDOGREL BISULFATE 75 MG/1
75 TABLET ORAL DAILY
Qty: 90 TABLET | Refills: 2 | Status: SHIPPED | OUTPATIENT
Start: 2025-08-19

## 2025-08-19 RX ORDER — SERTRALINE HYDROCHLORIDE 25 MG/1
25 TABLET, FILM COATED ORAL DAILY
Qty: 90 TABLET | Refills: 2 | Status: SHIPPED | OUTPATIENT
Start: 2025-08-19

## 2025-08-19 RX ORDER — AMLODIPINE BESYLATE 5 MG/1
5 TABLET ORAL DAILY
Qty: 90 TABLET | Refills: 2 | Status: SHIPPED | OUTPATIENT
Start: 2025-08-19

## 2025-08-20 LAB — HOLD SPECIMEN: NORMAL
